# Patient Record
Sex: FEMALE | Race: WHITE | NOT HISPANIC OR LATINO | Employment: UNEMPLOYED | URBAN - METROPOLITAN AREA
[De-identification: names, ages, dates, MRNs, and addresses within clinical notes are randomized per-mention and may not be internally consistent; named-entity substitution may affect disease eponyms.]

---

## 2017-02-22 ENCOUNTER — RECORDS - HEALTHEAST (OUTPATIENT)
Dept: LAB | Facility: CLINIC | Age: 82
End: 2017-02-22

## 2017-02-22 LAB
CHOLEST SERPL-MCNC: 147 MG/DL
FASTING STATUS PATIENT QL REPORTED: ABNORMAL
HDLC SERPL-MCNC: 42 MG/DL
LDLC SERPL CALC-MCNC: 82 MG/DL
TRIGL SERPL-MCNC: 113 MG/DL

## 2017-03-21 ENCOUNTER — RECORDS - HEALTHEAST (OUTPATIENT)
Dept: ADMINISTRATIVE | Facility: OTHER | Age: 82
End: 2017-03-21

## 2017-03-21 ENCOUNTER — OFFICE VISIT (OUTPATIENT)
Dept: NEUROLOGY | Facility: CLINIC | Age: 82
End: 2017-03-21

## 2017-03-21 VITALS
SYSTOLIC BLOOD PRESSURE: 167 MMHG | HEART RATE: 53 BPM | DIASTOLIC BLOOD PRESSURE: 57 MMHG | HEIGHT: 63 IN | OXYGEN SATURATION: 96 % | RESPIRATION RATE: 20 BRPM

## 2017-03-21 DIAGNOSIS — M25.552 HIP PAIN, LEFT: Primary | ICD-10-CM

## 2017-03-21 DIAGNOSIS — R26.9 GAIT DIFFICULTY: ICD-10-CM

## 2017-03-21 DIAGNOSIS — G25.0 ESSENTIAL TREMOR: ICD-10-CM

## 2017-03-21 ASSESSMENT — ENCOUNTER SYMPTOMS
DOUBLE VISION: 0
SORE THROAT: 0
TASTE DISTURBANCE: 0
SINUS PAIN: 0
JOINT SWELLING: 0
NECK PAIN: 0
MYALGIAS: 1
NECK MASS: 0
BACK PAIN: 1
EYE PAIN: 0
SMELL DISTURBANCE: 0
MUSCLE WEAKNESS: 1
HOARSE VOICE: 1
EYE WATERING: 0
EYE REDNESS: 0
ARTHRALGIAS: 1
MUSCLE CRAMPS: 1
EYE IRRITATION: 0
STIFFNESS: 1
SINUS CONGESTION: 0
TROUBLE SWALLOWING: 1

## 2017-03-21 ASSESSMENT — PAIN SCALES - GENERAL: PAINLEVEL: WORST PAIN (10)

## 2017-03-21 NOTE — MR AVS SNAPSHOT
After Visit Summary   3/21/2017    Jennifer Ferrera    MRN: [id number]           Patient Information     Date Of Birth          11/23/1931        Visit Information        Provider Department      3/21/2017 11:15 AM Sakina Gibson APRN CNP [facility]        Today's Diagnoses     Hip pain, left    -  1    Gait difficulty        Essential tremor          Care Instructions    March 21, 2017      Dear Ms. Jennifer Ferrera,    Thank you for coming today.  During your visit, we have discussed the following: -    Deep Brain Stimulation (DBS):    __ Your DBS electrical system function (impedance) is normal. The battery life is also good.   __  Referral to Orthopedic for a 2nd opinion of left hip pain.   __  Return in 6 months.     For questions call [phone]      It's good to see you!      IGNACIO Hanley, CNP  [facility]              Follow-ups after your visit        Additional Services     ORTHOPEDICS ADULT REFERRAL       Your provider has referred you to: [facility]: Orthopaedic Clinic [facility] [phone]   [email or web address]      Please be aware that coverage of these services is subject to the terms and limitations of your health insurance plan.  Call member services at your health plan with any benefit or coverage questions.      Please bring the following to your appointment:    >>   Any x-rays, CTs or MRIs which have been performed.  Contact the facility where they were done to arrange for  prior to your scheduled appointment.  Any new CT, MRI or other procedures ordered by your specialist must be performed at a [facility] facility or coordinated by your clinic's referral office.    >>   List of current medications   >>   This referral request   >>   Any documents/labs given to you for this referral                  Your next 10 appointments already scheduled     Sep 29, 2017  2:15 PM CDT   (Arrive by 2:00 PM)   Return Movement  "Disorder with Iban Neves MD   [facility] ([facility])    [address]  [address] [place] [address] [phone]              Who to contact     Please call your clinic at [phone] to:    Ask questions about your health    Make or cancel appointments    Discuss your medicines    Learn about your test results    Speak to your doctor   If you have compliments or concerns about an experience at your clinic, or if you wish to file a complaint, please contact [facility] Patient Relations at [phone] or email us at [email or web address]         Additional Information About Your Visit        [employer]-COM Information     [employer] gives you secure access to your electronic health record. If you see a primary care provider, you can also send messages to your care team and make appointments. If you have questions, please call your primary care clinic.  If you do not have a primary care provider, please call [phone] and they will assist you.      [employer] is an electronic gateway that provides easy, online access to your medical records. With [employer], you can request a clinic appointment, read your test results, renew a prescription or communicate with your care team.     To access your existing account, please contact your [facility] or call [phone] for assistance.        Care EveryWhere ID     This is your Care EveryWhere ID. This could be used by other organizations to access your [place] medical records  [id number]        Your Vitals Were     Pulse Respirations Height Pulse Oximetry Breastfeeding?       53 20 1.6 m (5' 3\") 96% No        Blood Pressure from Last 3 Encounters:   03/21/17 167/57   11/22/16 180/56   11/08/16 154/69    Weight from Last 3 Encounters:   11/08/16 93.7 kg (206 lb 9.1 oz)   10/04/16 93.1 kg (205 lb 4.8 oz)   03/28/16 89.3 kg (196 lb 12.8 oz)              We " Performed the Following     ORTHOPEDICS ADULT REFERRAL        Primary Care Provider Office Phone # Fax #    Michelle Jennifer Carvajal [phone]       [facility] [address]  [place] [address]        Thank you!     Thank you for choosing [facility]  for your care. Our goal is always to provide you with excellent care. Hearing back from our patients is one way we can continue to improve our services. Please take a few minutes to complete the written survey that you may receive in the mail after your visit with us. Thank you!             Your Updated Medication List - Protect others around you: Learn how to safely use, store and throw away your medicines at www.disposemymeds.org.          This list is accurate as of: 3/21/17 11:59 AM.  Always use your most recent med list.                   Brand Name Dispense Instructions for use    ALEVE 220 MG tablet   Generic drug:  naproxen sodium      Take 1 tablet by mouth 2 times daily (with meals).       aspirin 81 MG tablet     90 tablet    Take 1 tablet (81 mg) by mouth daily       atenolol 50 MG tablet    TENORMIN    45 tablet    Taking 0.5 tablet daily       escitalopram 10 MG tablet    LEXAPRO    90 tablet    Take 1 tablet (10 mg) by mouth daily       HYDROcodone-acetaminophen 5-325 MG per tablet    NORCO    60 tablet    Take 1-2 tablets by mouth every 6 hours as needed for pain.       IBANdronate 150 MG tablet    BONIVA    1 tablet    Take 1 tablet (150 mg) by mouth every 30 days       Levothyroxine Sodium 75 MCG Caps     30 capsule    Take 75 mcg by mouth daily       omeprazole 20 MG tablet     90 tablet    Take 1 tablet (20 mg) by mouth daily Take 30-60 minutes before a meal.       oxyCODONE 5 MG IR tablet    ROXICODONE    18 tablet    Take 1 tablet (5 mg) by mouth every 4 hours as needed for pain       pravastatin 20 MG tablet    PRAVACHOL    90 tablet    Take 1 tablet (20 mg) by mouth daily       REFRESH 1 % ophthalmic  solution   Generic drug:  carboxymethylcellulose     1 Bottle    Place 1 drop into both eyes 2 times daily as needed       vitamin D 400 UNITS capsule     90 capsule    1 tablet daily

## 2017-03-21 NOTE — PATIENT INSTRUCTIONS
March 21, 2017      Dear Ms. Jennifer RADHA Ferrera,    Thank you for coming today.  During your visit, we have discussed the following: -    Deep Brain Stimulation (DBS):    __ Your DBS electrical system function (impedance) is normal. The battery life is also good.   __  Referral to Orthopedic for a 2nd opinion of left hip pain.   __  Return in 6 months.     For questions call [phone]      It's good to see you!      IGNACIO Hanley, CNP  [facility]

## 2017-03-21 NOTE — PROGRESS NOTES
PATIENT: Jennifer Ferrera    : 1931    ALLY: 2017    REASON FOR VISIT:  Follow up for essential tremor (ET) and DBS interrogation and analysis.      HISTORY OF PRESENT ILLNESS:  Ms. Jennifer Ferrera is an 85-year-old right-handed  female who came to the [facility] accompanied by her daughter for a follow up visit.  She was last seen in clinic on 2016 by Dr. Neves for a routine PD follow up visit.  I saw her last about a year ago.      The patient reports overall tremor is okay.  She had bilateral DBS generator replacement in 2016.  Since her battery replacement, she has noticed more tremor improvement.  She is able to do almost everything independently.  She continues to live by herself.  She drives to grocery stores and runs errands.  Her daughter helps her with the big groceries about once a month.  Otherwise, she is independent.  She reports when she writes, her right hand cramps.  She has not had any falls.      The patient reports bothersome left hip pain.  She has been seen by primary care as well as by [facility].  She reports that this has been an ongoing issue that is getting worse.  She has had 1 steroid injection without much benefit.  She has to use a walker, otherwise she feels unsteady on her feet due to hip giving out.  She has not had any falls.    Answers for HPI/ROS submitted by the patient on 3/21/2017   General Symptoms: No  Skin Symptoms: No  HENT Symptoms: Yes  EYE SYMPTOMS: Yes  HEART SYMPTOMS: No  LUNG SYMPTOMS: No  INTESTINAL SYMPTOMS: No  URINARY SYMPTOMS: No  GYNECOLOGIC SYMPTOMS: No  BREAST SYMPTOMS: No  SKELETAL SYMPTOMS: Yes  BLOOD SYMPTOMS: No  NERVOUS SYSTEM SYMPTOMS: No  MENTAL HEALTH SYMPTOMS: No  Ear pain: No  Ear discharge: No  Hearing loss: No  Tinnitus: No  Nosebleeds: No  Congestion: No  Sinus pain: No  Trouble swallowing: Yes   Voice hoarseness: Yes  Mouth sores: No  Sore throat: No  Tooth pain: No  Gum tenderness: No  Bleeding  "gums: No  Change in taste: No  Change in sense of smell: No  Dry mouth: Yes  Hearing aid used: Yes  Neck lump: No  Eye pain: No  Vision loss: No  Dry eyes: Yes  Watery eyes: No  Eye bulging: No  Double vision: No  Flashing of lights: Yes  Spots: Yes  Floaters: Yes  Redness: No  Crossed eyes: No  Tunnel Vision: No  Yellowing of eyes: No  Eye irritation: No  Back pain: Yes  Muscle aches: Yes  Neck pain: No  Swollen joints: No  Joint pain: Yes  Bone pain: No  Muscle cramps: Yes  Muscle weakness: Yes  Joint stiffness: Yes  Bone fracture: No      MEDICATIONS:   Medication Sig     escitalopram (LEXAPRO) 10 MG Take 1 tablet (10 mg) by mouth daily     aspirin 81 MG tablet Take 1 tablet (81 mg) by mouth daily     oxyCODONE (ROXICODONE) 5 MG immediate release  Take 1 tablet (5 mg) by mouth every 4 hours as needed for pain     Levothyroxine Sodium 75 MCG  Take 75 mcg by mouth daily     IBANdronate (BONIVA) 150 MG  Take 1 tablet (150 mg) by mouth every 30 days     pravastatin (PRAVACHOL) 20 MG  Take 1 tablet (20 mg) by mouth daily     Cholecalciferol (VITAMIN D) 400 UNITS  1 tablet daily     atenolol (TENORMIN) 50 MG  Taking 0.5 tablet daily     omeprazole 20 MG tablet Take 1 tablet (20 mg) by mouth daily Take 30-60 minutes before a meal.     carboxymethylcellulose (REFRESH) 1 % ophthalmic solution Place 1 drop into both eyes 2 times daily as needed     naproxen sodium (ALEVE) 220 MG  Take 1 tablet by mouth 2 times daily (with meals).     HYDROcodone-acetaminophen 5-325 MG Take 1-2 tablets by mouth every 6 hours as needed for pain.       ALLERGIES: Vioxx     PHYSICAL EXAMINATION:     VITAL SIGNS:  Blood pressure 167/57, pulse 53, resp. rate 20, height 1.6 m (5' 3\"), SpO2 96 %,    GENERAL:  Ms. Ferrera is a pleasant  female who is well-groomed, well-developed and overweight, sitting comfortably in the exam room without any distress.  Affect is appropriate.     MOVEMENT DISORDERS ASSESSMENT:  Speech is slurred but " understandable.  She has mild to moderate dysarthria.  Normal facial expression.  No rest tremor in all extremities.  Slight postural tremor bilaterally.  Mild action tremor bilaterally.  No rigidity in all extremities.  Finger tapping is normal.  Hand rapid alternating movements show mild slowing bilaterally.  Leg agility shows mild slowing bilaterally.  She was able to arise from a chair with arms folded across her chest with 1 attempt.  Posture is slightly stooped.  She did not have her walker today and attempted to walk without a walker by leaning against a wall.  Gait is slow and guarded.  She almost lost her balance and almost fell if not caught by examiner.  She was assisted back to the room.  She reported left hip pain.  She had mild body bradykinesia.      DBS Interrogation & Analysis:     Implanted generator: Bilateral chest Activa-SC.  Lead placement: Bilateral Ventral Intermedius Nucleus of Thalamus (VIM).      Left VIM  C +, 0 -, 1 -  Volts: 2.4 volts  PW: 60 msec  Rate: 180 Hz     Therapy On: 100%  Battery Service Life: OK. 2.98 volts.     Patient :  Upper Limit = 2.8 volts  Lower Limit = 2.2 volts.   Electrode Impedance Check: (Amplitude 3.0 volts: PW 80 msec: Rate 100 Hz)   Left Lead: No Problems Found.     Right VIM  3 +, 1 -  Volts: 3.0 volts  PW: 90 msec  Rate: 130 Hz     Therapy On: 100 %  Battery Service Life: OK. 2.97 volts.     Patient :  Upper Limit: 3.0 volts  Lower Limit: 3.0 volts     Electrode Impedance Check: (Amplitude 3.0 volts: PW 80 msec: Rate 100 Hz)   Right Lead: No Problems Found.     See scanned report for impedance details.    ASSESSMENT/PLAN:       1.  Essential tremor.  Ms. Ferrera is an 85-year-old right-handed  female with a long-standing history of essential tremor, status post bilateral VIM and DBS implantation who came to the clinic for a follow up visit.  Overall tremor is well controlled.  She reports getting more improvement after her  bilateral DBS generator replacement.      Her DBS interrogation and analysis shows normal impedance.  Battery life is good.      2.  Left hip pain.   Discussed about getting a second opinion from one of the [facility] providers.  I recommended getting her records from [facility] before being seen.  A referral was placed in Epic.      She will return to our clinic in 6 months for a follow up and may return sooner as needed.      The total time spent with the patient was 45 minutes, 20 minutes was in DBS interrogation and analysis and 25 minutes in addressing ET and left hip pain; 50% of the time was spent in counseling and coordination of care.      IGNACIO Hanley, CNP  [facility]             D: 2017 13:09   T: 2017 14:13   MT: [id number]      Name:     RIANNA WALTERS   MRN:      [id number]        Account:      [id number]   :      1931           Service Date: 2017      Document: [id number]

## 2017-03-29 ENCOUNTER — OFFICE VISIT (OUTPATIENT)
Dept: ORTHOPEDICS | Facility: CLINIC | Age: 82
End: 2017-03-29

## 2017-03-29 VITALS — HEIGHT: 63 IN | BODY MASS INDEX: 36.5 KG/M2 | WEIGHT: 206 LBS

## 2017-03-29 DIAGNOSIS — M25.552 LEFT HIP PAIN: Primary | ICD-10-CM

## 2017-03-29 NOTE — PROGRESS NOTES
Subjective:   Jennifer Ferrera is a 85 year old female who is here for left hip pain exacerbated in the last 6-12 months, affecting ambulating per daughter.   Sore and hard to walk.  Sleep is fine.  Hard to get up and walk.  Loss of balance.  Afraid that she'll fall.  Rolling walker, hand shakes with cane.  Chair- lift.  PT didn't seem to help.  Pain wasn't going away.    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  She  has a past surgical history that includes Stereotactic insertion deep brain stimulation (12 May 2003); Stereotactic insertion deep brain stimulation (9 jun 2003); Replace pulse generator subcutaneous bilateral (29 Jul 2008); Stereotactic insertion deep brain stimulation (oct 2002); Stereotactic insertion deep brain stimulation (oct 2002); Right DBS Lead removal (jan 2003); Cholecystectomy; Extracapsular cataract extration with intraocular lens implant; Extracapsular cataract extration with intraocular lens implant; Hysterectomy; Replace pulse generator subcutaneous bilateral (7/2/2013); Excision of basal cell carcinoma from RUE (Sep 2015); and Replace Deep Brain Stimulation Generator / Battery Bilateral (Bilateral, 11/8/2016).  Her family history includes CANCER in her brother and brother; Connective Tissue Disorder in her sister; DIABETES in her father; Neurologic Disorder in her brother, brother, and brother; Parkinsonism in her mother, sister, and other family members.  She reports that she has never smoked. She has never used smokeless tobacco. She  "reports that she does not drink alcohol or use illicit drugs.    ALLERGIES: She is allergic to vioxx.    CURRENT MEDICATIONS: She has a current medication list which includes the following prescription(s): escitalopram, aspirin, oxycodone, levothyroxine sodium, ibandronate, pravastatin, vitamin d, atenolol, omeprazole, carboxymethylcellulose, naproxen sodium, and hydrocodone-acetaminophen.     REVIEW OF SYSTEMS: 10 point review of systems is negative except as noted above.     Exam:   Ht 5' 3\" (1.6 m)  Wt 206 lb (93.4 kg)  BMI 36.49 kg/m2           CONSTITUTIONIAL: alert, mild distress, cooperative and obese  HEAD: Normocephalic. No masses, lesions, tenderness or abnormalities  SKIN: no suspicious lesions or rashes  GAIT: antalgic and obese pattern  NEUROLOGIC: Non-focal, Normal muscle tone and strength, reflexes normal, sensation grossly normal.  PSYCHIATRIC: affect normal/bright and mentation appears normal.    MUSCULOSKELETAL: left hip pain  Palpation: Tender:   left greater trochanter, left gluteus medius  Non-tender:  right greater trochanter, right gluteus medius, no groin/hip joint pain elicited   Range of Motion:  Left Hip  external rotation  full, internal rotation  decreased  Strength:  full strength  Special tests:  no crepitation/snapping over central inguinal region         Assessment/Plan:   Pt is an 86 yo white female with PMHx of high cholesterol presenting with left greater trochanteric pain  1. Left greater trochanteric pain- recommended US guided greater trochanteric injection with Dr. Nicko Ravi, pt reports difficulty getting to PT due to ride situation.  Pt given 2 exercises to do at home, glute squeezes and standing at sink, extending leg back, squeezing the glute  RTC 4 weeks post injection    X-RAY INTERPRETATION:   X-Ray of the Hip: 2-view, ap pelvis, Left Frogleg Lateral ordered and interpreted in the office today was positive for mild to moderate OA  "

## 2017-03-29 NOTE — LETTER
3/29/2017      RE: Jennifer Ferrera  [address]  [place] [facility] [place] [address]        Subjective:   Jennifer Ferrrea is a 85 year old female who is here for left hip pain exacerbated in the last 6-12 months, affecting ambulating per daughter.   Sore and hard to walk.  Sleep is fine.  Hard to get up and walk.  Loss of balance.  Afraid that she'll fall.  Rolling walker, hand shakes with cane.  Chair- lift.  PT didn't seem to help.  Pain wasn't going away.    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  She  has a past surgical history that includes Stereotactic insertion deep brain stimulation (12 May 2003); Stereotactic insertion deep brain stimulation (9 jun 2003); Replace pulse generator subcutaneous bilateral (29 Jul 2008); Stereotactic insertion deep brain stimulation (oct 2002); Stereotactic insertion deep brain stimulation (oct 2002); Right DBS Lead removal (jan 2003); Cholecystectomy; Extracapsular cataract extration with intraocular lens implant; Extracapsular cataract extration with intraocular lens implant; Hysterectomy; Replace pulse generator subcutaneous bilateral (7/2/2013); Excision of basal cell carcinoma from RUE (Sep 2015); and Replace Deep Brain Stimulation Generator / Battery Bilateral (Bilateral, 11/8/2016).  Her family history includes CANCER in her brother and brother; Connective Tissue Disorder in her sister; DIABETES in her father; Neurologic Disorder in her brother, brother, and brother; Parkinsonism in her mother, sister, and other family members.   "She reports that she has never smoked. She has never used smokeless tobacco. She reports that she does not drink alcohol or use illicit drugs.    ALLERGIES: She is allergic to vioxx.    CURRENT MEDICATIONS: She has a current medication list which includes the following prescription(s): escitalopram, aspirin, oxycodone, levothyroxine sodium, ibandronate, pravastatin, vitamin d, atenolol, omeprazole, carboxymethylcellulose, naproxen sodium, and hydrocodone-acetaminophen.     REVIEW OF SYSTEMS: 10 point review of systems is negative except as noted above.     Exam:   Ht 5' 3\" (1.6 m)  Wt 206 lb (93.4 kg)  BMI 36.49 kg/m2           CONSTITUTIONIAL: alert, mild distress, cooperative and obese  HEAD: Normocephalic. No masses, lesions, tenderness or abnormalities  SKIN: no suspicious lesions or rashes  GAIT: antalgic and obese pattern  NEUROLOGIC: Non-focal, Normal muscle tone and strength, reflexes normal, sensation grossly normal.  PSYCHIATRIC: affect normal/bright and mentation appears normal.    MUSCULOSKELETAL: left hip pain  Palpation: Tender:   left greater trochanter, left gluteus medius  Non-tender:  right greater trochanter, right gluteus medius, no groin/hip joint pain elicited   Range of Motion:  Left Hip  external rotation  full, internal rotation  decreased  Strength:  full strength  Special tests:  no crepitation/snapping over central inguinal region         Assessment/Plan:   Pt is an 86 yo white female with PMHx of high cholesterol presenting with left greater trochanteric pain  1. Left greater trochanteric pain- recommended US guided greater trochanteric injection with Dr. Nicko Ravi, pt reports difficulty getting to PT due to ride situation.  Pt given 2 exercises to do at home, glute squeezes and standing at sink, extending leg back, squeezing the glute  RTC 4 weeks post injection    X-RAY INTERPRETATION:   X-Ray of the Hip: 2-view, ap pelvis, Left Frogleg Lateral ordered and interpreted in the " office today was positive for mild to moderate OA    Sabra Abreu MD

## 2017-03-29 NOTE — MR AVS SNAPSHOT
After Visit Summary   3/29/2017    Jennifer Ferrera    MRN: [id number]           Patient Information     Date Of Birth          11/23/1931        Visit Information        Provider Department      3/29/2017 1:45 PM Sabra Abreu MD [facility]        Today's Diagnoses     Left hip pain    -  1       Follow-ups after your visit        Your next 10 appointments already scheduled     Mar 30, 2017  3:15 PM CDT   (Arrive by 3:00 PM)   Procedure with Noe Altman MD   [facility] ([facility])    [address]  5th [address]  [place] [address] [phone]            Sep 29, 2017  2:15 PM CDT   (Arrive by 2:00 PM)   Return Movement Disorder with Iban Neves MD   [facility] ([facility])    [address]  3rd Floor  [place] [address] [phone]              Who to contact     Please call your clinic at [phone] to:    Ask questions about your health    Make or cancel appointments    Discuss your medicines    Learn about your test results    Speak to your doctor   If you have compliments or concerns about an experience at your clinic, or if you wish to file a complaint, please contact [facility] Patient Relations at [phone] or email us at [email or web address]         Additional Information About Your Visit        MyChart Information     [employer] gives you secure access to your electronic health record. If you see a primary care provider, you can also send messages to your care team and make appointments. If you have questions, please call your primary care clinic.  If you do not have a primary care provider, please call [phone] and they will assist you.      [employer] is an electronic gateway that provides easy, online access to your medical records. With [employer], you can request a clinic appointment, read your test results,  "renew a prescription or communicate with your care team.     To access your existing account, please contact your [facility] or call [phone] for assistance.        Care EveryWhere ID     This is your Care EveryWhere ID. This could be used by other organizations to access your [place] medical records  [id number]        Your Vitals Were     Height BMI (Body Mass Index)                1.6 m (5' 3\") 36.49 kg/m2           Blood Pressure from Last 3 Encounters:   03/21/17 167/57   11/22/16 180/56   11/08/16 154/69    Weight from Last 3 Encounters:   03/29/17 93.4 kg (206 lb)   11/08/16 93.7 kg (206 lb 9.1 oz)   10/04/16 93.1 kg (205 lb 4.8 oz)               Primary Care Provider Office Phone # Fax #    Michelle Carvajal [phone]       [facility] [address]  [place] [address]        Thank you!     Thank you for choosing [facility]  for your care. Our goal is always to provide you with excellent care. Hearing back from our patients is one way we can continue to improve our services. Please take a few minutes to complete the written survey that you may receive in the mail after your visit with us. Thank you!             Your Updated Medication List - Protect others around you: Learn how to safely use, store and throw away your medicines at www.disposemymeds.org.          This list is accurate as of: 3/29/17  2:31 PM.  Always use your most recent med list.                   Brand Name Dispense Instructions for use    ALEVE 220 MG tablet   Generic drug:  naproxen sodium      Take 1 tablet by mouth 2 times daily (with meals).       aspirin 81 MG tablet     90 tablet    Take 1 tablet (81 mg) by mouth daily       atenolol 50 MG tablet    TENORMIN    45 tablet    Taking 0.5 tablet daily       escitalopram 10 MG tablet    LEXAPRO    90 tablet    Take 1 tablet (10 mg) by mouth daily       HYDROcodone-acetaminophen 5-325 MG per tablet "    NORCO    60 tablet    Take 1-2 tablets by mouth every 6 hours as needed for pain.       IBANdronate 150 MG tablet    BONIVA    1 tablet    Take 1 tablet (150 mg) by mouth every 30 days       Levothyroxine Sodium 75 MCG Caps     30 capsule    Take 75 mcg by mouth daily       omeprazole 20 MG tablet     90 tablet    Take 1 tablet (20 mg) by mouth daily Take 30-60 minutes before a meal.       oxyCODONE 5 MG IR tablet    ROXICODONE    18 tablet    Take 1 tablet (5 mg) by mouth every 4 hours as needed for pain       pravastatin 20 MG tablet    PRAVACHOL    90 tablet    Take 1 tablet (20 mg) by mouth daily       REFRESH 1 % ophthalmic solution   Generic drug:  carboxymethylcellulose     1 Bottle    Place 1 drop into both eyes 2 times daily as needed       vitamin D 400 UNITS capsule     90 capsule    1 tablet daily

## 2017-03-30 ENCOUNTER — OFFICE VISIT (OUTPATIENT)
Dept: ORTHOPEDICS | Facility: CLINIC | Age: 82
End: 2017-03-30

## 2017-03-30 VITALS — WEIGHT: 206 LBS | HEIGHT: 63 IN | BODY MASS INDEX: 36.5 KG/M2

## 2017-03-30 DIAGNOSIS — M25.552 LATERAL PAIN OF LEFT HIP: Primary | ICD-10-CM

## 2017-03-30 RX ORDER — TRIAMCINOLONE ACETONIDE 40 MG/ML
40 INJECTION, SUSPENSION INTRA-ARTICULAR; INTRAMUSCULAR ONCE
Qty: 1 ML | Refills: 0 | OUTPATIENT
Start: 2017-03-30 | End: 2017-03-30

## 2017-03-30 NOTE — LETTER
3/30/2017      RE: Jennifer Ferrera  [address]  [place] [address]        Subjective:   Jennifer Ferrera is a 85 year old female with a hx of Bilateral hip osteoarthritis who is here for a left hip greater trochanter injection.  She relates a history of what sounds like a L hip joint injection 1-2 years ago without relief perhaps at [facility] in [place]. [facility] records signs for without result of injection. She gets pain in the L lateral hip with walking.  She had been referred to PT, but could not proceed as her BP was too high at visits. Otherwise well controlled.      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  She  has a past surgical history that includes Stereotactic insertion deep brain stimulation (12 May 2003); Stereotactic insertion deep brain stimulation (9 jun 2003); Replace pulse generator subcutaneous bilateral (29 Jul 2008); Stereotactic insertion deep brain stimulation (oct 2002); Stereotactic insertion deep brain stimulation (oct 2002); Right DBS Lead removal (jan 2003); Cholecystectomy; Extracapsular cataract extration with intraocular lens implant; Extracapsular cataract extration with intraocular lens implant; Hysterectomy; Replace pulse generator subcutaneous bilateral (7/2/2013); Excision of basal cell carcinoma from RUE (Sep 2015); and Replace Deep Brain Stimulation Generator / Battery Bilateral (Bilateral, 11/8/2016).   "Her family history includes CANCER in her brother and brother; Connective Tissue Disorder in her sister; DIABETES in her father; Neurologic Disorder in her brother, brother, and brother; Parkinsonism in her mother, sister, and other family members.  She reports that she has never smoked. She has never used smokeless tobacco. She reports that she does not drink alcohol or use illicit drugs.    ALLERGIES: She is allergic to vioxx.    CURRENT MEDICATIONS: She has a current medication list which includes the following prescription(s): escitalopram, aspirin, oxycodone, levothyroxine sodium, ibandronate, pravastatin, vitamin d, atenolol, omeprazole, carboxymethylcellulose, naproxen sodium, and hydrocodone-acetaminophen.     REVIEW OF SYSTEMS: 3 point review of systems is negative except as noted above.     Exam:   Ht 5' 3\" (1.6 m)  Wt 206 lb (93.4 kg)  BMI 36.49 kg/m2           CONSTITUTIONIAL: obese, pleasant, no distress  L hip tender L lateral hip over inferior facet greater troch. IR/ER does not reproduce pain. Some pain with reissted abduction  Mild low back tenderness as well       Assessment/Plan:    L lateral hip pain with walking  --discussed palpation guided vs U/S guided injection for this. Also that injections of steroid are temporary and should be paired with rehabilitative exercises.  --she opted for palpation guided injection and thought she could start PT if she went to [facility] which is close enough to her [place] where [place] drive on [address].  --order given    A steroid injection was performed at L inferior greater troch facet/bura at the point of max tenderness using 3cc 1% plain Lidocaine and 40 mg of Kenalog. This was well tolerated with decreased but not full relief of pain with abduction post injection.    Noe Altman MD CAQ    "

## 2017-03-30 NOTE — PROGRESS NOTES
Subjective:   Jennifer Ferrera is a 85 year old female with a hx of Bilateral hip osteoarthritis who is here for a left hip greater trochanter injection.  She relates a history of what sounds like a L hip joint injection 1-2 years ago without relief perhaps at [facility] in [place]. [facility] records signs for without result of injection. She gets pain in the L lateral hip with walking.  She had been referred to PT, but could not proceed as her BP was too high at visits. Otherwise well controlled.      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  She  has a past surgical history that includes Stereotactic insertion deep brain stimulation (12 May 2003); Stereotactic insertion deep brain stimulation (9 jun 2003); Replace pulse generator subcutaneous bilateral (29 Jul 2008); Stereotactic insertion deep brain stimulation (oct 2002); Stereotactic insertion deep brain stimulation (oct 2002); Right DBS Lead removal (jan 2003); Cholecystectomy; Extracapsular cataract extration with intraocular lens implant; Extracapsular cataract extration with intraocular lens implant; Hysterectomy; Replace pulse generator subcutaneous bilateral (7/2/2013); Excision of basal cell carcinoma from RUE (Sep 2015); and Replace Deep Brain Stimulation Generator / Battery Bilateral (Bilateral, 11/8/2016).  Her family history includes CANCER in her brother and brother; Connective Tissue  "Disorder in her sister; DIABETES in her father; Neurologic Disorder in her brother, brother, and brother; Parkinsonism in her mother, sister, and other family members.  She reports that she has never smoked. She has never used smokeless tobacco. She reports that she does not drink alcohol or use illicit drugs.    ALLERGIES: She is allergic to vioxx.    CURRENT MEDICATIONS: She has a current medication list which includes the following prescription(s): escitalopram, aspirin, oxycodone, levothyroxine sodium, ibandronate, pravastatin, vitamin d, atenolol, omeprazole, carboxymethylcellulose, naproxen sodium, and hydrocodone-acetaminophen.     REVIEW OF SYSTEMS: 3 point review of systems is negative except as noted above.     Exam:   Ht 5' 3\" (1.6 m)  Wt 206 lb (93.4 kg)  BMI 36.49 kg/m2           CONSTITUTIONIAL: obese, pleasant, no distress  L hip tender L lateral hip over inferior facet greater troch. IR/ER does not reproduce pain. Some pain with reissted abduction  Mild low back tenderness as well       Assessment/Plan:    L lateral hip pain with walking  --discussed palpation guided vs U/S guided injection for this. Also that injections of steroid are temporary and should be paired with rehabilitative exercises.  --she opted for palpation guided injection and thought she could start PT if she went to [facility] which is close enough to her house where she can drive on [place].  --order given    A steroid injection was performed at L inferior greater troch facet/bura at the point of max tenderness using 3cc 1% plain Lidocaine and 40 mg of Kenalog. This was well tolerated with decreased but not full relief of pain with abduction post injection.    Noe Altman MD CAQ    "

## 2017-03-30 NOTE — NURSING NOTE
[address] [address]  Dept: [phone]  ______________________________________________________________________________    Patient: Jennifer Ferrera   : 1931   MRN: [id number]   2017    INVASIVE PROCEDURE SAFETY CHECKLIST    Date: 3/30/2017   Procedure:Left hip injection with kenalog  Patient Name: Jennifer Ferrera  MRN: [id number]  YOB: 1931    Action: Complete sections as appropriate. Any discrepancy results in a HARD COPY until resolved.     PRE PROCEDURE:  Patient ID verified with 2 identifiers (name and  or MRN): Yes  Procedure and site verified with patient/designee (when able): Yes  Accurate consent documentation in medical record: Yes  H&P (or appropriate assessment) documented in medical record: Yes  H&P must be up to 20 days prior to procedure and updates within 24 hours of procedure as applicable: NA  Relevant diagnostic and radiology test results appropriately labeled and displayed as applicable: Yes  Procedure site(s) marked with provider initials: NA    TIMEOUT:  Time-Out performed immediately prior to starting procedure, including verbal and active participation of all team members addressing the following:Yes  * Correct patient identify  * Confirmed that the correct side and site are marked  * An accurate procedure consent form  * Agreement on the procedure to be done  * Correct patient position  * Relevant images and results are properly labeled and appropriately displayed  * The need to administer antibiotics or fluids for irrigation purposes during the procedure as applicable   * Safety precautions based on patient history or medication use    DURING PROCEDURE: Verification of correct person, site, and procedures any time the responsibility for care of the patient is transferred to another member of the care team.

## 2017-04-12 ENCOUNTER — COMMUNICATION - HEALTHEAST (OUTPATIENT)
Dept: TELEHEALTH | Facility: CLINIC | Age: 82
End: 2017-04-12

## 2017-04-12 ENCOUNTER — HOSPITAL ENCOUNTER (OUTPATIENT)
Dept: RADIOLOGY | Facility: CLINIC | Age: 82
Discharge: HOME | End: 2017-04-12
Attending: OTOLARYNGOLOGY

## 2017-04-12 DIAGNOSIS — K21.9 ESOPHAGEAL REFLUX: ICD-10-CM

## 2017-04-12 DIAGNOSIS — R13.10 DYSPHAGIA, UNSPECIFIED TYPE: ICD-10-CM

## 2017-05-30 ENCOUNTER — AMBULATORY - HEALTHEAST (OUTPATIENT)
Dept: ADMINISTRATIVE | Facility: REHABILITATION | Age: 82
End: 2017-05-30
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
## 2017-05-30 DIAGNOSIS — Z74.09 IMPAIRED FUNCTIONAL MOBILITY, BALANCE, GAIT, AND ENDURANCE: ICD-10-CM

## 2017-06-02 ENCOUNTER — OFFICE VISIT - HEALTHEAST (OUTPATIENT)
Dept: PHYSICAL THERAPY | Facility: REHABILITATION | Age: 82
End: 2017-06-02

## 2017-06-02 DIAGNOSIS — M62.81 GENERALIZED MUSCLE WEAKNESS: ICD-10-CM

## 2017-06-02 DIAGNOSIS — R26.81 UNSTEADINESS ON FEET: ICD-10-CM

## 2017-06-02 DIAGNOSIS — R26.9 ABNORMALITY OF GAIT: ICD-10-CM

## 2017-06-07 ENCOUNTER — OFFICE VISIT - HEALTHEAST (OUTPATIENT)
Dept: PHYSICAL THERAPY | Facility: REHABILITATION | Age: 82
End: 2017-06-07

## 2017-06-07 DIAGNOSIS — M62.81 GENERALIZED MUSCLE WEAKNESS: ICD-10-CM

## 2017-06-07 DIAGNOSIS — R26.81 UNSTEADINESS ON FEET: ICD-10-CM

## 2017-06-07 DIAGNOSIS — R26.9 ABNORMALITY OF GAIT: ICD-10-CM

## 2017-09-24 NOTE — PROGRESS NOTES
Diagnosis/Summary/Recommendations:    PATIENT: Jennifer Ferrera  85 year old female     : 1931    ALLY: 2017    Et  dbs 14 yrs ago    Not walking much due to hip pain and feet are sore   Using a walker  Went grocery shopping this morning.    She has ongoing arthritis.       Has had more slurring  She has had a teeth issue  She can sleep all the time    bp was on the higher side today    2016 left ipg and right replaced by Dr. wilson    Over 50% of this visit was spent in patient care and care coordination.     History obtained from patient    Total visit time was 25 minutes    PLAN  Ongoing hip issues  Deep brain stimulation (DBS) is working  Has increased slurring  Has gait problems that is most likely multifactorial.   Will forego imaging at this time.  Last seen 6 months ago.   Will have her come back to see Daja in 1 year.       Iban Neves MD     ______________________________________    Last visit date and details:     Tremor     She still has slurring of speech     Has had hip pain and now has a lift chair that has helped her out.   Had been seen at [facility] in the past.   Still living in [place]     Taking atenolol for blood pressure.   Blood pressure machine was not working today but will be checked again.     Still taking lexapro     Taking boniva once month     On thyroid medication     On pravastatin     On omeprazole     Sleeping - 3 and 4 hours and then gets up to go to the bathroom.  She is sleeping a lot during the day.      She put on 10 lbs the last month and has increased swelling in her legs.      PLAN:     Probably would have her get bilateral ipg replaced - left is 2.59 and the right is getting lower  Referral to Dr. Collins     Return to see daja in 6 months     Iban neves MD            PATIENT: Jennifer Ferrera     : 1931     ALLY: 2017     REASON FOR VISIT:  Follow up for essential tremor (ET) and DBS interrogation and analysis.       HISTORY OF  PRESENT ILLNESS:  Ms. Jennifer Ferrera is an 85-year-old right-handed  female who came to the [facility] accompanied by her daughter for a follow up visit.  She was last seen in clinic on 09/13/2016 by Dr. Neves for a routine PD follow up visit.  I saw her last about a year ago.       The patient reports overall tremor is okay.  She had bilateral DBS generator replacement in 11/2016.  Since her battery replacement, she has noticed more tremor improvement.  She is able to do almost everything independently.  She continues to live by herself.  She drives to grocery stores and runs errands.  Her daughter helps her with the big groceries about once a month.  Otherwise, she is independent.  She reports when she writes, her right hand cramps.  She has not had any falls.       The patient reports bothersome left hip pain.  She has been seen by primary care as well as by [facility].  She reports that this has been an ongoing issue that is getting worse.  She has had 1 steroid injection without much benefit.  She has to use a walker, otherwise she feels unsteady on her feet due to hip giving out.  She has not had any falls.     Answers for HPI/ROS submitted by the patient on 3/21/2017   General Symptoms: No  Skin Symptoms: No  HENT Symptoms: Yes  EYE SYMPTOMS: Yes  HEART SYMPTOMS: No  LUNG SYMPTOMS: No  INTESTINAL SYMPTOMS: No  URINARY SYMPTOMS: No  GYNECOLOGIC SYMPTOMS: No  BREAST SYMPTOMS: No  SKELETAL SYMPTOMS: Yes  BLOOD SYMPTOMS: No  NERVOUS SYSTEM SYMPTOMS: No  MENTAL HEALTH SYMPTOMS: No  Ear pain: No  Ear discharge: No  Hearing loss: No  Tinnitus: No  Nosebleeds: No  Congestion: No  Sinus pain: No  Trouble swallowing: Yes   Voice hoarseness: Yes  Mouth sores: No  Sore throat: No  Tooth pain: No  Gum tenderness: No  Bleeding gums: No  Change in taste: No  Change in sense of smell: No  Dry mouth: Yes  Hearing aid used: Yes  Neck lump: No  Eye pain: No  Vision loss: No  Dry eyes: Yes  Watery eyes:  "No  Eye bulging: No  Double vision: No  Flashing of lights: Yes  Spots: Yes  Floaters: Yes  Redness: No  Crossed eyes: No  Tunnel Vision: No  Yellowing of eyes: No  Eye irritation: No  Back pain: Yes  Muscle aches: Yes  Neck pain: No  Swollen joints: No  Joint pain: Yes  Bone pain: No  Muscle cramps: Yes  Muscle weakness: Yes  Joint stiffness: Yes  Bone fracture: No        MEDICATIONS:        Medication Sig     escitalopram (LEXAPRO) 10 MG Take 1 tablet (10 mg) by mouth daily     aspirin 81 MG tablet Take 1 tablet (81 mg) by mouth daily     oxyCODONE (ROXICODONE) 5 MG immediate release  Take 1 tablet (5 mg) by mouth every 4 hours as needed for pain     Levothyroxine Sodium 75 MCG  Take 75 mcg by mouth daily     IBANdronate (BONIVA) 150 MG  Take 1 tablet (150 mg) by mouth every 30 days     pravastatin (PRAVACHOL) 20 MG  Take 1 tablet (20 mg) by mouth daily     Cholecalciferol (VITAMIN D) 400 UNITS  1 tablet daily     atenolol (TENORMIN) 50 MG  Taking 0.5 tablet daily     omeprazole 20 MG tablet Take 1 tablet (20 mg) by mouth daily Take 30-60 minutes before a meal.     carboxymethylcellulose (REFRESH) 1 % ophthalmic solution Place 1 drop into both eyes 2 times daily as needed     naproxen sodium (ALEVE) 220 MG  Take 1 tablet by mouth 2 times daily (with meals).     HYDROcodone-acetaminophen 5-325 MG Take 1-2 tablets by mouth every 6 hours as needed for pain.         ALLERGIES: Vioxx      PHYSICAL EXAMINATION:      VITAL SIGNS:  Blood pressure 167/57, pulse 53, resp. rate 20, height 1.6 m (5' 3\"), SpO2 96 %,     GENERAL:  Ms. Ferrera is a pleasant  female who is well-groomed, well-developed and overweight, sitting comfortably in the exam room without any distress.  Affect is appropriate.      MOVEMENT DISORDERS ASSESSMENT:  Speech is slurred but understandable.  She has mild to moderate dysarthria.  Normal facial expression.  No rest tremor in all extremities.  Slight postural tremor bilaterally.  Mild action " tremor bilaterally.  No rigidity in all extremities.  Finger tapping is normal.  Hand rapid alternating movements show mild slowing bilaterally.  Leg agility shows mild slowing bilaterally.  She was able to arise from a chair with arms folded across her chest with 1 attempt.  Posture is slightly stooped.  She did not have her walker today and attempted to walk without a walker by leaning against a wall.  Gait is slow and guarded.  She almost lost her balance and almost fell if not caught by examiner.  She was assisted back to the room.  She reported left hip pain.  She had mild body bradykinesia.       DBS Interrogation & Analysis:      Implanted generator: Bilateral chest Activa-SC.  Lead placement: Bilateral Ventral Intermedius Nucleus of Thalamus (VIM).       Left VIM  C +, 0 -, 1 -  Volts: 2.4 volts  PW: 60 msec  Rate: 180 Hz      Therapy On: 100%  Battery Service Life: OK. 2.98 volts.      Patient :  Upper Limit = 2.8 volts  Lower Limit = 2.2 volts.   Electrode Impedance Check: (Amplitude 3.0 volts: PW 80 msec: Rate 100 Hz)   Left Lead: No Problems Found.      Right VIM  3 +, 1 -  Volts: 3.0 volts  PW: 90 msec  Rate: 130 Hz      Therapy On: 100 %  Battery Service Life: OK. 2.97 volts.      Patient :  Upper Limit: 3.0 volts  Lower Limit: 3.0 volts      Electrode Impedance Check: (Amplitude 3.0 volts: PW 80 msec: Rate 100 Hz)   Right Lead: No Problems Found.      See scanned report for impedance details.     ASSESSMENT/PLAN:         1.  Essential tremor.  Ms. Ferrera is an 85-year-old right-handed  female with a long-standing history of essential tremor, status post bilateral VIM and DBS implantation who came to the clinic for a follow up visit.  Overall tremor is well controlled.  She reports getting more improvement after her bilateral DBS generator replacement.       Her DBS interrogation and analysis shows normal impedance.  Battery life is good.       2.  Left hip pain.   Discussed  about getting a second opinion from one of the [facility] providers.  I recommended getting her records from [facility] before being seen.  A referral was placed in Epic.       She will return to our clinic in 6 months for a follow up and may return sooner as needed.       The total time spent with the patient was 45 minutes, 20 minutes was in DBS interrogation and analysis and 25 minutes in addressing ET and left hip pain; 50% of the time was spent in counseling and coordination of care.       IGNACIO Hanley, CNP  [facility]                D: 2017 13:09   T: 2017 14:13   MT: [id number]       Name:     RIANNA WALTERS   MRN:      [id number]        Account:      [id number]   :      1931           Service Date: 2017       Document: [id number]         ______________________________________      Patient was asked about 14 Review of systems including changes in vision (dry eyes, double vision), hearing, heart, lungs, musculoskeletal, depression, anxiety, snoring, RBD, insomnia, urinary frequency, urinary urgency, constipation, swallowing problems, hematological, ID, allergies, skin problems: seborrhea, endocrinological: thyroid, diabetes, cholesterol; balance, weight changes, and other neurological problems and these were not significant at this time except for   Patient Active Problem List   Diagnosis     Tremor     S/P brain surgery     Lymphedema     Anxiety     PSEUDOBULBAR AFFECT     Wears glasses     Hearing loss     Urinary incontinence, urge     Urinary urgency     Memory loss     Skin cancer     Hypercholesterolemia     Nocturia     Mood change (H)     Macular degeneration (senile) of retina     Recurrent falls     Osteoporosis     Dysarthria     Facial numbness     Tooth infection     Basal cell carcinoma of deltoid region, right     1St degree AV block     Low back pain          Allergies   Allergen Reactions     Vioxx      Pt. Is unaware of any allergy to this  med.     Past Surgical History:   Procedure Laterality Date     CHOLECYSTECTOMY       Excision of basal cell carcinoma from RUE  Sep 2015    right shoulder     EXTRACAPSULAR CATARACT EXTRATION WITH INTRAOCULAR LENS IMPLANT       EXTRACAPSULAR CATARACT EXTRATION WITH INTRAOCULAR LENS IMPLANT       HYSTERECTOMY       REPLACE DEEP BRAIN STIMULATION GENERATOR / BATTERY BILATERAL Bilateral 11/8/2016    Procedure: REPLACE DEEP BRAIN STIMULATION GENERATOR / BATTERY BILATERAL;  Surgeon: Bentley Martinez MD;  Location: [facility] OR     REPLACE PULSE GENERATOR SUBCUTANEOUS BILATERAL  29 Jul 2008    Replaced both soletra     REPLACE PULSE GENERATOR SUBCUTANEOUS BILATERAL  7/2/2013    Procedure: REPLACE PULSE GENERATOR SUBCUTANEOUS BILATERAL;  Bilateral Deep Brain Stimulator Battery Exchange ;  Surgeon: Bentley aMrtinez MD;  Location: [facility] OR     Right DBS Lead removal  jan 2003    infected right lead removed; was in a nursing home on antiobiotics     STEREOTACTIC INSERTION DEEP BRAIN STIMULATION  12 May 2003    placed lead in may after removal of right thalamic  DBS lead due to infection feb 17 2003     STEREOTACTIC INSERTION DEEP BRAIN STIMULATION  9 jun 2003    revision of right DBS electrode     STEREOTACTIC INSERTION DEEP BRAIN STIMULATION  oct 2002    left thalamic DBS lead placed     STEREOTACTIC INSERTION DEEP BRAIN STIMULATION  oct 2002    right thalamic DBS lead placed     Past Medical History:   Diagnosis Date     1St degree AV block 10/20/2015    ECG shows mild sinus bradycardia with 1st degree heart block and min voltage criteria for LVH; borderline ECG     Anxiety      Dysarthria      Facial numbness      Hearing loss      Hypercholesterolemia      Hypertension      Low back pain      Lymphedema      Macular degeneration (senile) of retina      Memory loss      Mood change (H)      Osteoporosis, unspecified      PSEUDOBULBAR AFFECT      Recurrent falls      Skin cancer      Tremor      Urinary incontinence,  urge      Urinary urgency      Wears glasses      Social History     Social History     Marital status:      Spouse name: N/A     Number of children: N/A     Years of education: N/A     Occupational History     Not on file.     Social History Main Topics     Smoking status: Never Smoker     Smokeless tobacco: Never Used     Alcohol use No     Drug use: No     Sexual activity: Not on file     Other Topics Concern     Not on file     Social History Narrative    lives in [place]. . Daughter Mary Ann Mijares.       Drug and lactation database from the [place] [employer]:  http://toxnet.nlm.nih.gov/cgi-bin/sis/htmlgen?LACT       .[email or web address]    B/P: Data Unavailable, T: Data Unavailable, P: Data Unavailable, R: Data Unavailable 0 lbs 0 oz  not currently breastfeeding., There is no height or weight on file to calculate BMI.  Medications and Vitals not listed above were documented in the cart and reviewed by me.     Current Outpatient Prescriptions   Medication Sig Dispense Refill     escitalopram (LEXAPRO) 10 MG tablet Take 1 tablet (10 mg) by mouth daily 90 tablet 3     aspirin 81 MG tablet Take 1 tablet (81 mg) by mouth daily 90 tablet 0     oxyCODONE (ROXICODONE) 5 MG immediate release tablet Take 1 tablet (5 mg) by mouth every 4 hours as needed for pain 18 tablet 0     Levothyroxine Sodium 75 MCG CAPS Take 75 mcg by mouth daily 30 capsule      IBANdronate (BONIVA) 150 MG tablet Take 1 tablet (150 mg) by mouth every 30 days 1 tablet 11     pravastatin (PRAVACHOL) 20 MG tablet Take 1 tablet (20 mg) by mouth daily 90 tablet 3     Cholecalciferol (VITAMIN D) 400 UNITS capsule 1 tablet daily 90 capsule 11     atenolol (TENORMIN) 50 MG tablet Taking 0.5 tablet daily 45 tablet 3     omeprazole 20 MG tablet Take 1 tablet (20 mg) by mouth daily Take 30-60 minutes before a meal. 90 tablet 3     carboxymethylcellulose (REFRESH) 1 % ophthalmic solution Place 1 drop into both eyes 2 times  daily as needed 1 Bottle      naproxen sodium (ALEVE) 220 MG tablet Take 1 tablet by mouth 2 times daily (with meals).       HYDROcodone-acetaminophen 5-325 MG per tablet Take 1-2 tablets by mouth every 6 hours as needed for pain. 60 tablet 0         Iban Neves MD

## 2017-09-29 ENCOUNTER — TELEPHONE (OUTPATIENT)
Dept: NEUROLOGY | Facility: CLINIC | Age: 82
End: 2017-09-29

## 2017-09-29 ENCOUNTER — OFFICE VISIT (OUTPATIENT)
Dept: NEUROLOGY | Facility: CLINIC | Age: 82
End: 2017-09-29

## 2017-09-29 VITALS
BODY MASS INDEX: 37.28 KG/M2 | DIASTOLIC BLOOD PRESSURE: 63 MMHG | SYSTOLIC BLOOD PRESSURE: 186 MMHG | HEART RATE: 59 BPM | HEIGHT: 63 IN | WEIGHT: 210.4 LBS

## 2017-09-29 DIAGNOSIS — G25.0 ESSENTIAL TREMOR: Primary | ICD-10-CM

## 2017-09-29 RX ORDER — OXYBUTYNIN CHLORIDE 5 MG/1
5 TABLET ORAL DAILY
Qty: 360 TABLET | Refills: 0 | COMMUNITY
Start: 2017-09-29 | End: 2018-10-25

## 2017-09-29 RX ORDER — OXYBUTYNIN CHLORIDE 5 MG/1
5 TABLET, EXTENDED RELEASE ORAL DAILY
Qty: 90 TABLET | Refills: 3 | COMMUNITY
Start: 2017-09-29 | End: 2019-10-22

## 2017-09-29 ASSESSMENT — PAIN SCALES - GENERAL: PAINLEVEL: NO PAIN (0)

## 2017-09-29 NOTE — NURSING NOTE
Chief Complaint   Patient presents with     RECHECK     P RETURN - MOVEMENT DISORDER     Ade Fernandez MA

## 2017-09-29 NOTE — LETTER
2017      RE: Jennifer Ferrera  [address]  [facility] [id number]       Diagnosis/Summary/Recommendations:    PATIENT: Jennifer Ferrera  85 year old female     : 1931    ALLY: 2017    Et  dbs              Over 50% of this visit was spent in patient care and care coordination.     History obtained from patient    Total visit time was 25 minutes      Iban Neves MD     ______________________________________    Last visit date and details:     Tremor     She still has slurring of speech     Has had hip pain and now has a lift chair that has helped her out.   Had been seen at [facility] in the past.   Still living in [place]     Taking atenolol for blood pressure.   Blood pressure machine was not working today but will be checked again.     Still taking lexapro     Taking boniva once month     On thyroid medication     On pravastatin     On omeprazole     Sleeping - 3 and 4 hours and then gets up to go to the bathroom.  She is sleeping a lot during the day.      She put on 10 lbs the last month and has increased swelling in her legs.      PLAN:     Probably would have her get bilateral ipg replaced - left is 2.59 and the right is getting lower  Referral to Dr. Collins     Return to see [facility] in 6 months     Iban neves MD            PATIENT: Jennifer Ferrera     : 1931     ALLY: 2017     REASON FOR VISIT:  Follow up for essential tremor (ET) and DBS interrogation and analysis.       HISTORY OF PRESENT ILLNESS:  Ms. Jennifer Ferrera is an 85-year-old right-handed  female who came to the [facility] accompanied by her daughter for a follow up visit.  She was last seen in clinic on 2016 by Dr. Neves for a routine PD follow up visit.  I saw her last about a year ago.       The patient reports overall tremor is okay.  She had bilateral DBS generator replacement in 2016.  Since her battery replacement, she has noticed more tremor improvement.  She is able  to do almost everything independently.  She continues to live by herself.  She drives to grocery stores and runs errands.  Her daughter helps her with the big groceries about once a month.  Otherwise, she is independent.  She reports when she writes, her right hand cramps.  She has not had any falls.       The patient reports bothersome left hip pain.  She has been seen by primary care as well as by [facility].  She reports that this has been an ongoing issue that is getting worse.  She has had 1 steroid injection without much benefit.  She has to use a walker, otherwise she feels unsteady on her feet due to hip giving out.  She has not had any falls.     Answers for HPI/ROS submitted by the patient on 3/21/2017   General Symptoms: No  Skin Symptoms: No  HENT Symptoms: Yes  EYE SYMPTOMS: Yes  HEART SYMPTOMS: No  LUNG SYMPTOMS: No  INTESTINAL SYMPTOMS: No  URINARY SYMPTOMS: No  GYNECOLOGIC SYMPTOMS: No  BREAST SYMPTOMS: No  SKELETAL SYMPTOMS: Yes  BLOOD SYMPTOMS: No  NERVOUS SYSTEM SYMPTOMS: No  MENTAL HEALTH SYMPTOMS: No  Ear pain: No  Ear discharge: No  Hearing loss: No  Tinnitus: No  Nosebleeds: No  Congestion: No  Sinus pain: No  Trouble swallowing: Yes   Voice hoarseness: Yes  Mouth sores: No  Sore throat: No  Tooth pain: No  Gum tenderness: No  Bleeding gums: No  Change in taste: No  Change in sense of smell: No  Dry mouth: Yes  Hearing aid used: Yes  Neck lump: No  Eye pain: No  Vision loss: No  Dry eyes: Yes  Watery eyes: No  Eye bulging: No  Double vision: No  Flashing of lights: Yes  Spots: Yes  Floaters: Yes  Redness: No  Crossed eyes: No  Tunnel Vision: No  Yellowing of eyes: No  Eye irritation: No  Back pain: Yes  Muscle aches: Yes  Neck pain: No  Swollen joints: No  Joint pain: Yes  Bone pain: No  Muscle cramps: Yes  Muscle weakness: Yes  Joint stiffness: Yes  Bone fracture: No        MEDICATIONS:        Medication Sig     escitalopram (LEXAPRO) 10 MG Take 1 tablet (10 mg) by mouth daily      "aspirin 81 MG tablet Take 1 tablet (81 mg) by mouth daily     oxyCODONE (ROXICODONE) 5 MG immediate release  Take 1 tablet (5 mg) by mouth every 4 hours as needed for pain     Levothyroxine Sodium 75 MCG  Take 75 mcg by mouth daily     IBANdronate (BONIVA) 150 MG  Take 1 tablet (150 mg) by mouth every 30 days     pravastatin (PRAVACHOL) 20 MG  Take 1 tablet (20 mg) by mouth daily     Cholecalciferol (VITAMIN D) 400 UNITS  1 tablet daily     atenolol (TENORMIN) 50 MG  Taking 0.5 tablet daily     omeprazole 20 MG tablet Take 1 tablet (20 mg) by mouth daily Take 30-60 minutes before a meal.     carboxymethylcellulose (REFRESH) 1 % ophthalmic solution Place 1 drop into both eyes 2 times daily as needed     naproxen sodium (ALEVE) 220 MG  Take 1 tablet by mouth 2 times daily (with meals).     HYDROcodone-acetaminophen 5-325 MG Take 1-2 tablets by mouth every 6 hours as needed for pain.         ALLERGIES: Vioxx      PHYSICAL EXAMINATION:      VITAL SIGNS:  Blood pressure 167/57, pulse 53, resp. rate 20, height 1.6 m (5' 3\"), SpO2 96 %,     GENERAL:  Ms. Ferrera is a pleasant  female who is well-groomed, well-developed and overweight, sitting comfortably in the exam room without any distress.  Affect is appropriate.      MOVEMENT DISORDERS ASSESSMENT:  Speech is slurred but understandable.  She has mild to moderate dysarthria.  Normal facial expression.  No rest tremor in all extremities.  Slight postural tremor bilaterally.  Mild action tremor bilaterally.  No rigidity in all extremities.  Finger tapping is normal.  Hand rapid alternating movements show mild slowing bilaterally.  Leg agility shows mild slowing bilaterally.  She was able to arise from a chair with arms folded across her chest with 1 attempt.  Posture is slightly stooped.  She did not have her walker today and attempted to walk without a walker by leaning against a wall.  Gait is slow and guarded.  She almost lost her balance and almost fell if not " caught by examiner.  She was assisted back to the room.  She reported left hip pain.  She had mild body bradykinesia.       DBS Interrogation & Analysis:      Implanted generator: Bilateral chest Activa-SC.  Lead placement: Bilateral Ventral Intermedius Nucleus of Thalamus (VIM).       Left VIM  C +, 0 -, 1 -  Volts: 2.4 volts  PW: 60 msec  Rate: 180 Hz      Therapy On: 100%  Battery Service Life: OK. 2.98 volts.      Patient :  Upper Limit = 2.8 volts  Lower Limit = 2.2 volts.   Electrode Impedance Check: (Amplitude 3.0 volts: PW 80 msec: Rate 100 Hz)   Left Lead: No Problems Found.      Right VIM  3 +, 1 -  Volts: 3.0 volts  PW: 90 msec  Rate: 130 Hz      Therapy On: 100 %  Battery Service Life: OK. 2.97 volts.      Patient :  Upper Limit: 3.0 volts  Lower Limit: 3.0 volts      Electrode Impedance Check: (Amplitude 3.0 volts: PW 80 msec: Rate 100 Hz)   Right Lead: No Problems Found.      See scanned report for impedance details.     ASSESSMENT/PLAN:         1.  Essential tremor.  Ms. Ferrera is an 85-year-old right-handed  female with a long-standing history of essential tremor, status post bilateral VIM and DBS implantation who came to the clinic for a follow up visit.  Overall tremor is well controlled.  She reports getting more improvement after her bilateral DBS generator replacement.       Her DBS interrogation and analysis shows normal impedance.  Battery life is good.       2.  Left hip pain.   Discussed about getting a second opinion from one of the [facility] providers.  I recommended getting her records from [facility] before being seen.  A referral was placed in Epic.       She will return to our clinic in 6 months for a follow up and may return sooner as needed.       The total time spent with the patient was 45 minutes, 20 minutes was in DBS interrogation and analysis and 25 minutes in addressing ET and left hip pain; 50% of the time was spent in counseling and  coordination of care.       IGNACIO Hanley, CNP  [facility]                D: 2017 13:09   T: 2017 14:13   MT: [id number]       Name:     RIANNA WALTERS   MRN:      [id number]        Account:      [id number]   :      1931           Service Date: 2017       Document: [id number]         ______________________________________      Patient was asked about 14 Review of systems including changes in vision (dry eyes, double vision), hearing, heart, lungs, musculoskeletal, depression, anxiety, snoring, RBD, insomnia, urinary frequency, urinary urgency, constipation, swallowing problems, hematological, ID, allergies, skin problems: seborrhea, endocrinological: thyroid, diabetes, cholesterol; balance, weight changes, and other neurological problems and these were not significant at this time except for   Patient Active Problem List   Diagnosis     Tremor     S/P brain surgery     Lymphedema     Anxiety     PSEUDOBULBAR AFFECT     Wears glasses     Hearing loss     Urinary incontinence, urge     Urinary urgency     Memory loss     Skin cancer     Hypercholesterolemia     Nocturia     Mood change (H)     Macular degeneration (senile) of retina     Recurrent falls     Osteoporosis     Dysarthria     Facial numbness     Tooth infection     Basal cell carcinoma of deltoid region, right     1St degree AV block     Low back pain          Allergies   Allergen Reactions     Vioxx      Pt. Is unaware of any allergy to this med.     Past Surgical History:   Procedure Laterality Date     CHOLECYSTECTOMY       Excision of basal cell carcinoma from RUE  Sep 2015    right shoulder     EXTRACAPSULAR CATARACT EXTRATION WITH INTRAOCULAR LENS IMPLANT       EXTRACAPSULAR CATARACT EXTRATION WITH INTRAOCULAR LENS IMPLANT       HYSTERECTOMY       REPLACE DEEP BRAIN STIMULATION GENERATOR / BATTERY BILATERAL Bilateral 2016    Procedure: REPLACE DEEP BRAIN STIMULATION GENERATOR / BATTERY BILATERAL;  Surgeon:  Bentley Martinez MD;  Location: [facility] OR     REPLACE PULSE GENERATOR SUBCUTANEOUS BILATERAL  29 Jul 2008    Replaced both soletra     REPLACE PULSE GENERATOR SUBCUTANEOUS BILATERAL  7/2/2013    Procedure: REPLACE PULSE GENERATOR SUBCUTANEOUS BILATERAL;  Bilateral Deep Brain Stimulator Battery Exchange ;  Surgeon: Bentley Martinez MD;  Location: [facility] OR     Right DBS Lead removal  jan 2003    infected right lead removed; was in a nursing home on antiobiotics     STEREOTACTIC INSERTION DEEP BRAIN STIMULATION  12 May 2003    placed lead in may after removal of right thalamic  DBS lead due to infection feb 17 2003     STEREOTACTIC INSERTION DEEP BRAIN STIMULATION  9 jun 2003    revision of right DBS electrode     STEREOTACTIC INSERTION DEEP BRAIN STIMULATION  oct 2002    left thalamic DBS lead placed     STEREOTACTIC INSERTION DEEP BRAIN STIMULATION  oct 2002    right thalamic DBS lead placed     Past Medical History:   Diagnosis Date     1St degree AV block 10/20/2015    ECG shows mild sinus bradycardia with 1st degree heart block and min voltage criteria for LVH; borderline ECG     Anxiety      Dysarthria      Facial numbness      Hearing loss      Hypercholesterolemia      Hypertension      Low back pain      Lymphedema      Macular degeneration (senile) of retina      Memory loss      Mood change (H)      Osteoporosis, unspecified      PSEUDOBULBAR AFFECT      Recurrent falls      Skin cancer      Tremor      Urinary incontinence, urge      Urinary urgency      Wears glasses      Social History     Social History     Marital status:      Spouse name: N/A     Number of children: N/A     Years of education: N/A     Occupational History     Not on file.     Social History Main Topics     Smoking status: Never Smoker     Smokeless tobacco: Never Used     Alcohol use No     Drug use: No     Sexual activity: Not on file     Other Topics Concern     Not on file     Social History Narrative    lives in  [place]. . Daughter Mary Ann Mijares.       Drug and lactation database from the [place] [employer]:  http://toxnet.nlm.nih.gov/cgi-bin/sis/htmlgen?LACT       .[email or web address]    B/P: Data Unavailable, T: Data Unavailable, P: Data Unavailable, R: Data Unavailable 0 lbs 0 oz  not currently breastfeeding., There is no height or weight on file to calculate BMI.  Medications and Vitals not listed above were documented in the cart and reviewed by me.     Current Outpatient Prescriptions   Medication Sig Dispense Refill     escitalopram (LEXAPRO) 10 MG tablet Take 1 tablet (10 mg) by mouth daily 90 tablet 3     aspirin 81 MG tablet Take 1 tablet (81 mg) by mouth daily 90 tablet 0     oxyCODONE (ROXICODONE) 5 MG immediate release tablet Take 1 tablet (5 mg) by mouth every 4 hours as needed for pain 18 tablet 0     Levothyroxine Sodium 75 MCG CAPS Take 75 mcg by mouth daily 30 capsule      IBANdronate (BONIVA) 150 MG tablet Take 1 tablet (150 mg) by mouth every 30 days 1 tablet 11     pravastatin (PRAVACHOL) 20 MG tablet Take 1 tablet (20 mg) by mouth daily 90 tablet 3     Cholecalciferol (VITAMIN D) 400 UNITS capsule 1 tablet daily 90 capsule 11     atenolol (TENORMIN) 50 MG tablet Taking 0.5 tablet daily 45 tablet 3     omeprazole 20 MG tablet Take 1 tablet (20 mg) by mouth daily Take 30-60 minutes before a meal. 90 tablet 3     carboxymethylcellulose (REFRESH) 1 % ophthalmic solution Place 1 drop into both eyes 2 times daily as needed 1 Bottle      naproxen sodium (ALEVE) 220 MG tablet Take 1 tablet by mouth 2 times daily (with meals).       HYDROcodone-acetaminophen 5-325 MG per tablet Take 1-2 tablets by mouth every 6 hours as needed for pain. 60 tablet 0         Iban Neves MD

## 2017-09-29 NOTE — TELEPHONE ENCOUNTER
Interrogated patient's DBS Activa SC battery. Patient has RVIM. All impedances were checked at 3.0 Volts and were WNL. See [employer] Neuromodulation sheets scanned. Patient informed.    Using contacts = 1-/3+    Model 04708    Battery voltage 2.96  ------------------------------------  Interrogated patient's DBS Activa SC battery. Patient has LSTN. All impedances were checked at 3.0 Volts and were WNL. See [employer] Neuromodulation sheets scanned. Patient informed.    Therapy impedance = 563 ohms, 4.193 mA    Using contacts = C+/0-/1-    Model 59102    Battery voltage 2.98      MRN [id number]  Diagnosis Tremor

## 2017-09-29 NOTE — LETTER
2017      RE: Jennifer Ferrera  [address]  [facility] [address]       Diagnosis/Summary/Recommendations:    PATIENT: Jennifer Ferrera  85 year old female     : 1931    ALLY: 2017    Et  dbs 14 yrs ago    Not walking much due to hip pain and feet are sore   Using a walker  Went grocery shopping this morning.    She has ongoing arthritis.       Has had more slurring  She has had a teeth issue  She can sleep all the time    bp was on the higher side today    2016 left ipg and right replaced by Dr. wilson    Over 50% of this visit was spent in patient care and care coordination.     History obtained from patient    Total visit time was 25 minutes    PLAN  Ongoing hip issues  Deep brain stimulation (DBS) is working  Has increased slurring  Has gait problems that is most likely multifactorial.   Will forego imaging at this time.  Last seen 6 months ago.   Will have her come back to see Daja in 1 year.       Iban Neves MD     ______________________________________    Last visit date and details:     Tremor     She still has slurring of speech     Has had hip pain and now has a lift chair that has helped her out.   Had been seen at [facility] in the past.   Still living in [place]     Taking atenolol for blood pressure.   Blood pressure machine was not working today but will be checked again.     Still taking lexapro     Taking boniva once month     On thyroid medication     On pravastatin     On omeprazole     Sleeping - 3 and 4 hours and then gets up to go to the bathroom.  She is sleeping a lot during the day.      She put on 10 lbs the last month and has increased swelling in her legs.      PLAN:     Probably would have her get bilateral ipg replaced - left is 2.59 and the right is getting lower  Referral to Dr. Collins     Return to see daja in 6 months     Iban neves MD            PATIENT: Jennifer Ferrera     : 1931     ALLY: 2017     REASON FOR VISIT:  Follow up  for essential tremor (ET) and DBS interrogation and analysis.       HISTORY OF PRESENT ILLNESS:  Ms. Jennifer Ferrera is an 85-year-old right-handed  female who came to the [facility] accompanied by her daughter for a follow up visit.  She was last seen in clinic on 09/13/2016 by Dr. Neves for a routine PD follow up visit.  I saw her last about a year ago.       The patient reports overall tremor is okay.  She had bilateral DBS generator replacement in 11/2016.  Since her battery replacement, she has noticed more tremor improvement.  She is able to do almost everything independently.  She continues to live by herself.  She drives to grocery stores and runs errands.  Her daughter helps her with the big groceries about once a month.  Otherwise, she is independent.  She reports when she writes, her right hand cramps.  She has not had any falls.       The patient reports bothersome left hip pain.  She has been seen by primary care as well as by [facility].  She reports that this has been an ongoing issue that is getting worse.  She has had 1 steroid injection without much benefit.  She has to use a walker, otherwise she feels unsteady on her feet due to hip giving out.  She has not had any falls.     Answers for HPI/ROS submitted by the patient on 3/21/2017   General Symptoms: No  Skin Symptoms: No  HENT Symptoms: Yes  EYE SYMPTOMS: Yes  HEART SYMPTOMS: No  LUNG SYMPTOMS: No  INTESTINAL SYMPTOMS: No  URINARY SYMPTOMS: No  GYNECOLOGIC SYMPTOMS: No  BREAST SYMPTOMS: No  SKELETAL SYMPTOMS: Yes  BLOOD SYMPTOMS: No  NERVOUS SYSTEM SYMPTOMS: No  MENTAL HEALTH SYMPTOMS: No  Ear pain: No  Ear discharge: No  Hearing loss: No  Tinnitus: No  Nosebleeds: No  Congestion: No  Sinus pain: No  Trouble swallowing: Yes   Voice hoarseness: Yes  Mouth sores: No  Sore throat: No  Tooth pain: No  Gum tenderness: No  Bleeding gums: No  Change in taste: No  Change in sense of smell: No  Dry mouth: Yes  Hearing aid used:  "Yes  Neck lump: No  Eye pain: No  Vision loss: No  Dry eyes: Yes  Watery eyes: No  Eye bulging: No  Double vision: No  Flashing of lights: Yes  Spots: Yes  Floaters: Yes  Redness: No  Crossed eyes: No  Tunnel Vision: No  Yellowing of eyes: No  Eye irritation: No  Back pain: Yes  Muscle aches: Yes  Neck pain: No  Swollen joints: No  Joint pain: Yes  Bone pain: No  Muscle cramps: Yes  Muscle weakness: Yes  Joint stiffness: Yes  Bone fracture: No        MEDICATIONS:        Medication Sig     escitalopram (LEXAPRO) 10 MG Take 1 tablet (10 mg) by mouth daily     aspirin 81 MG tablet Take 1 tablet (81 mg) by mouth daily     oxyCODONE (ROXICODONE) 5 MG immediate release  Take 1 tablet (5 mg) by mouth every 4 hours as needed for pain     Levothyroxine Sodium 75 MCG  Take 75 mcg by mouth daily     IBANdronate (BONIVA) 150 MG  Take 1 tablet (150 mg) by mouth every 30 days     pravastatin (PRAVACHOL) 20 MG  Take 1 tablet (20 mg) by mouth daily     Cholecalciferol (VITAMIN D) 400 UNITS  1 tablet daily     atenolol (TENORMIN) 50 MG  Taking 0.5 tablet daily     omeprazole 20 MG tablet Take 1 tablet (20 mg) by mouth daily Take 30-60 minutes before a meal.     carboxymethylcellulose (REFRESH) 1 % ophthalmic solution Place 1 drop into both eyes 2 times daily as needed     naproxen sodium (ALEVE) 220 MG  Take 1 tablet by mouth 2 times daily (with meals).     HYDROcodone-acetaminophen 5-325 MG Take 1-2 tablets by mouth every 6 hours as needed for pain.         ALLERGIES: Vioxx      PHYSICAL EXAMINATION:      VITAL SIGNS:  Blood pressure 167/57, pulse 53, resp. rate 20, height 1.6 m (5' 3\"), SpO2 96 %,     GENERAL:  Ms. Ferrera is a pleasant  female who is well-groomed, well-developed and overweight, sitting comfortably in the exam room without any distress.  Affect is appropriate.      MOVEMENT DISORDERS ASSESSMENT:  Speech is slurred but understandable.  She has mild to moderate dysarthria.  Normal facial expression.  No rest " tremor in all extremities.  Slight postural tremor bilaterally.  Mild action tremor bilaterally.  No rigidity in all extremities.  Finger tapping is normal.  Hand rapid alternating movements show mild slowing bilaterally.  Leg agility shows mild slowing bilaterally.  She was able to arise from a chair with arms folded across her chest with 1 attempt.  Posture is slightly stooped.  She did not have her walker today and attempted to walk without a walker by leaning against a wall.  Gait is slow and guarded.  She almost lost her balance and almost fell if not caught by examiner.  She was assisted back to the room.  She reported left hip pain.  She had mild body bradykinesia.       DBS Interrogation & Analysis:      Implanted generator: Bilateral chest Activa-SC.  Lead placement: Bilateral Ventral Intermedius Nucleus of Thalamus (VIM).       Left VIM  C +, 0 -, 1 -  Volts: 2.4 volts  PW: 60 msec  Rate: 180 Hz      Therapy On: 100%  Battery Service Life: OK. 2.98 volts.      Patient :  Upper Limit = 2.8 volts  Lower Limit = 2.2 volts.   Electrode Impedance Check: (Amplitude 3.0 volts: PW 80 msec: Rate 100 Hz)   Left Lead: No Problems Found.      Right VIM  3 +, 1 -  Volts: 3.0 volts  PW: 90 msec  Rate: 130 Hz      Therapy On: 100 %  Battery Service Life: OK. 2.97 volts.      Patient :  Upper Limit: 3.0 volts  Lower Limit: 3.0 volts      Electrode Impedance Check: (Amplitude 3.0 volts: PW 80 msec: Rate 100 Hz)   Right Lead: No Problems Found.      See scanned report for impedance details.     ASSESSMENT/PLAN:         1.  Essential tremor.  Ms. Ferrera is an 85-year-old right-handed  female with a long-standing history of essential tremor, status post bilateral VIM and DBS implantation who came to the clinic for a follow up visit.  Overall tremor is well controlled.  She reports getting more improvement after her bilateral DBS generator replacement.       Her DBS interrogation and analysis shows  normal impedance.  Battery life is good.       2.  Left hip pain.   Discussed about getting a second opinion from one of the [facility] providers.  I recommended getting her records from [facility] before being seen.  A referral was placed in Epic.       She will return to our clinic in 6 months for a follow up and may return sooner as needed.       The total time spent with the patient was 45 minutes, 20 minutes was in DBS interrogation and analysis and 25 minutes in addressing ET and left hip pain; 50% of the time was spent in counseling and coordination of care.       IGNACIO Hanley, CNP  [facility]                D: 2017 13:09   T: 2017 14:13   MT: [id number]       Name:     RIANNA WALTERS   MRN:      [id number]        Account:      [id number]   :      1931           Service Date: 2017       Document: [id number]         ______________________________________      Patient was asked about 14 Review of systems including changes in vision (dry eyes, double vision), hearing, heart, lungs, musculoskeletal, depression, anxiety, snoring, RBD, insomnia, urinary frequency, urinary urgency, constipation, swallowing problems, hematological, ID, allergies, skin problems: seborrhea, endocrinological: thyroid, diabetes, cholesterol; balance, weight changes, and other neurological problems and these were not significant at this time except for   Patient Active Problem List   Diagnosis     Tremor     S/P brain surgery     Lymphedema     Anxiety     PSEUDOBULBAR AFFECT     Wears glasses     Hearing loss     Urinary incontinence, urge     Urinary urgency     Memory loss     Skin cancer     Hypercholesterolemia     Nocturia     Mood change (H)     Macular degeneration (senile) of retina     Recurrent falls     Osteoporosis     Dysarthria     Facial numbness     Tooth infection     Basal cell carcinoma of deltoid region, right     1St degree AV block     Low back pain           Allergies   Allergen Reactions     Vioxx      Pt. Is unaware of any allergy to this med.     Past Surgical History:   Procedure Laterality Date     CHOLECYSTECTOMY       Excision of basal cell carcinoma from RUE  Sep 2015    right shoulder     EXTRACAPSULAR CATARACT EXTRATION WITH INTRAOCULAR LENS IMPLANT       EXTRACAPSULAR CATARACT EXTRATION WITH INTRAOCULAR LENS IMPLANT       HYSTERECTOMY       REPLACE DEEP BRAIN STIMULATION GENERATOR / BATTERY BILATERAL Bilateral 11/8/2016    Procedure: REPLACE DEEP BRAIN STIMULATION GENERATOR / BATTERY BILATERAL;  Surgeon: Bentley Martinez MD;  Location: [facility] OR     REPLACE PULSE GENERATOR SUBCUTANEOUS BILATERAL  29 Jul 2008    Replaced both soletra     REPLACE PULSE GENERATOR SUBCUTANEOUS BILATERAL  7/2/2013    Procedure: REPLACE PULSE GENERATOR SUBCUTANEOUS BILATERAL;  Bilateral Deep Brain Stimulator Battery Exchange ;  Surgeon: Bentley Martinez MD;  Location: [facility] OR     Right DBS Lead removal  jan 2003    infected right lead removed; was in a nursing home on antiobiotics     STEREOTACTIC INSERTION DEEP BRAIN STIMULATION  12 May 2003    placed lead in may after removal of right thalamic  DBS lead due to infection feb 17 2003     STEREOTACTIC INSERTION DEEP BRAIN STIMULATION  9 jun 2003    revision of right DBS electrode     STEREOTACTIC INSERTION DEEP BRAIN STIMULATION  oct 2002    left thalamic DBS lead placed     STEREOTACTIC INSERTION DEEP BRAIN STIMULATION  oct 2002    right thalamic DBS lead placed     Past Medical History:   Diagnosis Date     1St degree AV block 10/20/2015    ECG shows mild sinus bradycardia with 1st degree heart block and min voltage criteria for LVH; borderline ECG     Anxiety      Dysarthria      Facial numbness      Hearing loss      Hypercholesterolemia      Hypertension      Low back pain      Lymphedema      Macular degeneration (senile) of retina      Memory loss      Mood change (H)      Osteoporosis, unspecified      PSEUDOBULBAR  AFFECT      Recurrent falls      Skin cancer      Tremor      Urinary incontinence, urge      Urinary urgency      Wears glasses      Social History     Social History     Marital status:      Spouse name: N/A     Number of children: N/A     Years of education: N/A     Occupational History     Not on file.     Social History Main Topics     Smoking status: Never Smoker     Smokeless tobacco: Never Used     Alcohol use No     Drug use: No     Sexual activity: Not on file     Other Topics Concern     Not on file     Social History Narrative    lives in [place]. . Daughter Mary Ann Mijares.       Drug and lactation database from the [place] National Library of Medicine:  http://toxnet.nlm.nih.gov/cgi-bin/sis/htmlgen?LACT       .MDSPDTABLE    B/P: Data Unavailable, T: Data Unavailable, P: Data Unavailable, R: Data Unavailable 0 lbs 0 oz  not currently breastfeeding., There is no height or weight on file to calculate BMI.  Medications and Vitals not listed above were documented in the cart and reviewed by me.     Current Outpatient Prescriptions   Medication Sig Dispense Refill     escitalopram (LEXAPRO) 10 MG tablet Take 1 tablet (10 mg) by mouth daily 90 tablet 3     aspirin 81 MG tablet Take 1 tablet (81 mg) by mouth daily 90 tablet 0     oxyCODONE (ROXICODONE) 5 MG immediate release tablet Take 1 tablet (5 mg) by mouth every 4 hours as needed for pain 18 tablet 0     Levothyroxine Sodium 75 MCG CAPS Take 75 mcg by mouth daily 30 capsule      IBANdronate (BONIVA) 150 MG tablet Take 1 tablet (150 mg) by mouth every 30 days 1 tablet 11     pravastatin (PRAVACHOL) 20 MG tablet Take 1 tablet (20 mg) by mouth daily 90 tablet 3     Cholecalciferol (VITAMIN D) 400 UNITS capsule 1 tablet daily 90 capsule 11     atenolol (TENORMIN) 50 MG tablet Taking 0.5 tablet daily 45 tablet 3     omeprazole 20 MG tablet Take 1 tablet (20 mg) by mouth daily Take 30-60 minutes before a meal. 90 tablet 3      carboxymethylcellulose (REFRESH) 1 % ophthalmic solution Place 1 drop into both eyes 2 times daily as needed 1 Bottle      naproxen sodium (ALEVE) 220 MG tablet Take 1 tablet by mouth 2 times daily (with meals).       HYDROcodone-acetaminophen 5-325 MG per tablet Take 1-2 tablets by mouth every 6 hours as needed for pain. 60 tablet 0         Iban Neves MD

## 2017-09-29 NOTE — MR AVS SNAPSHOT
After Visit Summary   9/29/2017    Jennifer Ferrrea    MRN: [id number]           Patient Information     Date Of Birth          11/23/1931        Visit Information        Provider Department      9/29/2017 2:15 PM Iban Neves MD [facility]        Today's Diagnoses     Essential tremor    -  1       Follow-ups after your visit        Follow-up notes from your care team     Return in about 1 year (around 9/29/2018).      Your next 10 appointments already scheduled     Sep 12, 2018 10:10 AM CDT   (Arrive by 9:55 AM)   Return Movement Disorder with IGNACIO Green CNP   [facility] ([facility])    [address] [address] [phone]              Who to contact     Please call your clinic at [phone] to:    Ask questions about your health    Make or cancel appointments    Discuss your medicines    Learn about your test results    Speak to your doctor   If you have compliments or concerns about an experience at your clinic, or if you wish to file a complaint, please contact [facility] Patient Relations at [phone] or email us at [email or web address]         Additional Information About Your Visit        MyChart Information     [employer] gives you secure access to your electronic health record. If you see a primary care provider, you can also send messages to your care team and make appointments. If you have questions, please call your primary care clinic.  If you do not have a primary care provider, please call [phone] and they will assist you.      [employer] is an electronic gateway that provides easy, online access to your medical records. With [employer], you can request a clinic appointment, read your test results, renew a prescription or communicate with your care team.     To access your existing account, please contact your [facility] or  "call [phone] for assistance.        Care EveryWhere ID     This is your Care EveryWhere ID. This could be used by other organizations to access your [facility] medical records  [id number]        Your Vitals Were     Pulse Height BMI (Body Mass Index)             59 1.6 m (5' 3\") 37.27 kg/m2          Blood Pressure from Last 3 Encounters:   09/29/17 186/63   03/21/17 167/57   11/22/16 180/56    Weight from Last 3 Encounters:   09/29/17 95.4 kg (210 lb 6.4 oz)   03/30/17 93.4 kg (206 lb)   03/29/17 93.4 kg (206 lb)              Today, you had the following     No orders found for display         Today's Medication Changes          These changes are accurate as of: 9/29/17  2:37 PM.  If you have any questions, ask your nurse or doctor.               Stop taking these medicines if you haven't already. Please contact your care team if you have questions.     oxyCODONE 5 MG IR tablet   Commonly known as:  ROXICODONE   Stopped by:  Iban Neves MD                    Primary Care Provider Office Phone # Fax #    Michelle Jennifer Carvajal [phone]       [facility] [place] [address]        Equal Access to Services     JALYN FERNANDEZ AH: Hadii floyd peña hadasho Soomaali, waaxda luqadaha, qaybta kaalmada adeegyada, damon finch. So [facility] [phone].    ATENCIÓN: Si habla español, tiene a jarquin disposición servicios gratuitos de asistencia lingüística. Llame al [phone].    We comply with applicable federal civil rights laws and [place] laws. We do not discriminate on the basis of race, color, national origin, age, disability, sex, sexual orientation, or gender identity.            Thank you!     Thank you for choosing [facility]  for your care. Our goal is always to provide you with excellent care. Hearing back from our patients is one way we can continue to improve our services. Please take a few minutes to complete the written survey that you may receive " in the mail after your visit with us. Thank you!             Your Updated Medication List - Protect others around you: Learn how to safely use, store and throw away your medicines at www.disposemymeds.org.          This list is accurate as of: 9/29/17  2:37 PM.  Always use your most recent med list.                   Brand Name Dispense Instructions for use Diagnosis    ALEVE 220 MG tablet   Generic drug:  naproxen sodium      Take 1 tablet by mouth 2 times daily (with meals).        aspirin 81 MG tablet     90 tablet    Take 1 tablet (81 mg) by mouth daily    Tremor       atenolol 50 MG tablet    TENORMIN    45 tablet    Taking 0.5 tablet daily    Elevated blood pressure       escitalopram 10 MG tablet    LEXAPRO    90 tablet    Take 1 tablet (10 mg) by mouth daily    Depression       IBANdronate 150 MG tablet    BONIVA    1 tablet    Take 1 tablet (150 mg) by mouth every 30 days    Essential tremor       Levothyroxine Sodium 75 MCG Caps     30 capsule    Take 75 mcg by mouth daily        omeprazole 20 MG tablet     90 tablet    Take 1 tablet (20 mg) by mouth daily Take 30-60 minutes before a meal.        * oxybutynin 5 MG tablet    DITROPAN    360 tablet    Take 1 tablet (5 mg) by mouth daily        * DITROPAN XL 5 MG 24 hr tablet   Generic drug:  oxybutynin     90 tablet    Take 1 tablet (5 mg) by mouth daily        pravastatin 20 MG tablet    PRAVACHOL    90 tablet    Take 1 tablet (20 mg) by mouth daily        REFRESH 1 % ophthalmic solution   Generic drug:  carboxymethylcellulose     1 Bottle    Place 1 drop into both eyes 2 times daily as needed        vitamin D 400 UNITS capsule     90 capsule    1 tablet daily        * Notice:  This list has 2 medication(s) that are the same as other medications prescribed for you. Read the directions carefully, and ask your doctor or other care provider to review them with you.

## 2017-09-29 NOTE — Clinical Note
9/29/2017       RE: Jennifer Ferrera  [address]  [facility] [address]     Dear Colleague,    Thank you for referring your patient, Jennifer Ferrera, to the [facility] at [facility]. Please see a copy of my visit note below.    No notes on file    Again, thank you for allowing me to participate in the care of your patient.      Sincerely,    Iban Neves MD

## 2017-12-11 DIAGNOSIS — F43.21 ADJUSTMENT DISORDER WITH DEPRESSED MOOD: Primary | ICD-10-CM

## 2017-12-11 DIAGNOSIS — F32.A DEPRESSION: ICD-10-CM

## 2017-12-11 RX ORDER — ESCITALOPRAM OXALATE 10 MG/1
TABLET ORAL
Qty: 90 TABLET | Refills: 3 | Status: SHIPPED | OUTPATIENT
Start: 2017-12-11 | End: 2018-10-25

## 2018-04-25 ENCOUNTER — RECORDS - HEALTHEAST (OUTPATIENT)
Dept: LAB | Facility: CLINIC | Age: 83
End: 2018-04-25

## 2018-04-25 LAB
ANION GAP SERPL CALCULATED.3IONS-SCNC: 11 MMOL/L (ref 5–18)
BUN SERPL-MCNC: 12 MG/DL (ref 8–28)
CALCIUM SERPL-MCNC: 8.7 MG/DL (ref 8.5–10.5)
CHLORIDE BLD-SCNC: 102 MMOL/L (ref 98–107)
CHOLEST SERPL-MCNC: 152 MG/DL
CO2 SERPL-SCNC: 25 MMOL/L (ref 22–31)
CREAT SERPL-MCNC: 0.84 MG/DL (ref 0.6–1.1)
FASTING STATUS PATIENT QL REPORTED: NO
GFR SERPL CREATININE-BSD FRML MDRD: >60 ML/MIN/1.73M2
GLUCOSE BLD-MCNC: 81 MG/DL (ref 70–125)
HDLC SERPL-MCNC: 41 MG/DL
LDLC SERPL CALC-MCNC: 90 MG/DL
POTASSIUM BLD-SCNC: 4.3 MMOL/L (ref 3.5–5)
SODIUM SERPL-SCNC: 138 MMOL/L (ref 136–145)
TRIGL SERPL-MCNC: 107 MG/DL
TSH SERPL DL<=0.005 MIU/L-ACNC: 1.57 UIU/ML (ref 0.3–5)

## 2018-10-23 NOTE — PROGRESS NOTES
Diagnosis/Summary/Recommendations:    PATIENT: Jennifer Ferrera  86 year old female     : 1931    ALLY: 2018    Essential Tremor  DBS 15 yrs ago    No short circuits and battery is fine.     Had ambulance come to house due to bleeding. Did not go to the emergency room   800 dollars    Battery is doing well.         Medications     6-9am 8am 8pm   ASpirin 81mg  1    Atenolol tenormin 50mg Not taking     Carboxymethylcellulose refresh 1% opthalmic solution      Cholecalciferol vitamin d 400 or 1000 units   1    Escitalopram lexapro 20mg   1    Furosemide lasix 40mg  1    Ibandronate boniva 150mg 21st of every month     Levothyroxine sodium 75mcg 1     Naproxen aleve 220mg  1 1   Omeprazole 20mg  1    Oxybutynin ditropan XL 5mg 24 hr tablet  1    Oxybutynin ditropan 5mg tablet  Not taking    Pravastatin pravachol 20mg   1                                History obtained from patient     No change in medication regimen  Battery is fine  She continues to live independently with someone who comes in every few weeks to clean and to help out with her feet care.     Return back in one year.       Coding statement:   Duration of  Services: patient care and care coordination was 25 minutes  Greater than 50% of this visit was spent in counseling and coordination of care.     Iban Neves MD     ______________________________________    Last visit date and details:      ALLY: 2017     Et  dbs 14 yrs ago     Not walking much due to hip pain and feet are sore   Using a walker  Went grocery shopping this morning.     She has ongoing arthritis.         Has had more slurring  She has had a teeth issue  She can sleep all the time     bp was on the higher side today     2016 left ipg and right replaced by Dr. wilson     Over 50% of this visit was spent in patient care and care coordination.      History obtained from patient     Total visit time was 25 minutes     PLAN  Ongoing hip issues  Deep brain  stimulation (DBS) is working  Has increased slurring  Has gait problems that is most likely multifactorial.   Will forego imaging at this time.  Last seen 6 months ago.   Will have her come back to see Daja in 1 year.         ______________________________________      Patient was asked about 14 Review of systems including changes in vision (dry eyes, double vision), hearing, heart, lungs, musculoskeletal, depression, anxiety, snoring, RBD, insomnia, urinary frequency, urinary urgency, constipation, swallowing problems, hematological, ID, allergies, skin problems: seborrhea, endocrinological: thyroid, diabetes, cholesterol; balance, weight changes, and other neurological problems and these were not significant at this time except for   Patient Active Problem List   Diagnosis     Tremor     S/P brain surgery     Lymphedema     Anxiety     PSEUDOBULBAR AFFECT     Wears glasses     Hearing loss     Urinary incontinence, urge     Urinary urgency     Memory loss     Skin cancer     Hypercholesterolemia     Nocturia     Mood change     Macular degeneration (senile) of retina     Recurrent falls     Osteoporosis     Dysarthria     Facial numbness     Tooth infection     Basal cell carcinoma of deltoid region, right     1st degree AV block     Low back pain          Allergies   Allergen Reactions     Vioxx      Pt. Is unaware of any allergy to this med.     Past Surgical History:   Procedure Laterality Date     CHOLECYSTECTOMY       Excision of basal cell carcinoma from RUE  Sep 2015    right shoulder     EXTRACAPSULAR CATARACT EXTRATION WITH INTRAOCULAR LENS IMPLANT       EXTRACAPSULAR CATARACT EXTRATION WITH INTRAOCULAR LENS IMPLANT       HYSTERECTOMY       REPLACE DEEP BRAIN STIMULATION GENERATOR / BATTERY BILATERAL Bilateral 11/8/2016    Procedure: REPLACE DEEP BRAIN STIMULATION GENERATOR / BATTERY BILATERAL;  Surgeon: Bentley Martinez MD;  Location: [facility] OR     REPLACE PULSE GENERATOR SUBCUTANEOUS BILATERAL  29  Jul 2008    Replaced both soletra     REPLACE PULSE GENERATOR SUBCUTANEOUS BILATERAL  7/2/2013    Procedure: REPLACE PULSE GENERATOR SUBCUTANEOUS BILATERAL;  Bilateral Deep Brain Stimulator Battery Exchange ;  Surgeon: Bentley Martinez MD;  Location: [facility] OR     Right DBS Lead removal  jan 2003    infected right lead removed; was in a nursing home on antiobiotics     STEREOTACTIC INSERTION DEEP BRAIN STIMULATION  12 May 2003    placed lead in may after removal of right thalamic  DBS lead due to infection feb 17 2003     STEREOTACTIC INSERTION DEEP BRAIN STIMULATION  9 jun 2003    revision of right DBS electrode     STEREOTACTIC INSERTION DEEP BRAIN STIMULATION  oct 2002    left thalamic DBS lead placed     STEREOTACTIC INSERTION DEEP BRAIN STIMULATION  oct 2002    right thalamic DBS lead placed     Past Medical History:   Diagnosis Date     1St degree AV block 10/20/2015    ECG shows mild sinus bradycardia with 1st degree heart block and min voltage criteria for LVH; borderline ECG     Anxiety      Dysarthria      Facial numbness      Hearing loss      Hypercholesterolemia      Hypertension      Low back pain      Lymphedema      Macular degeneration (senile) of retina      Memory loss      Mood change (H)      Osteoporosis, unspecified      PSEUDOBULBAR AFFECT      Recurrent falls      Skin cancer      Tremor      Urinary incontinence, urge      Urinary urgency      Wears glasses      Social History     Social History     Marital status:      Spouse name: N/A     Number of children: N/A     Years of education: N/A     Occupational History     Not on file.     Social History Main Topics     Smoking status: Never Smoker     Smokeless tobacco: Never Used     Alcohol use No     Drug use: No     Sexual activity: Not on file     Other Topics Concern     Not on file     Social History Narrative    lives in [place]. . Daughter Mary Ann Mijares.       Drug and lactation database from the [facility]  National Library of Medicine:  http://toxnet.nlm.nih.gov/cgi-bin/sis/htmlgen?LACT      B/P: Data Unavailable, T: Data Unavailable, P: Data Unavailable, R: Data Unavailable 0 lbs 0 oz  not currently breastfeeding., There is no height or weight on file to calculate BMI.  Medications and Vitals not listed above were documented in the cart and reviewed by me.     Current Outpatient Prescriptions   Medication Sig Dispense Refill     aspirin 81 MG tablet Take 1 tablet (81 mg) by mouth daily 90 tablet 0     atenolol (TENORMIN) 50 MG tablet Taking 0.5 tablet daily 45 tablet 3     carboxymethylcellulose (REFRESH) 1 % ophthalmic solution Place 1 drop into both eyes 2 times daily as needed 1 Bottle      Cholecalciferol (VITAMIN D) 400 UNITS capsule 1 tablet daily 90 capsule 11     escitalopram (LEXAPRO) 10 MG tablet TAKE 1 TABLET(10 MG) BY MOUTH DAILY 90 tablet 3     IBANdronate (BONIVA) 150 MG tablet Take 1 tablet (150 mg) by mouth every 30 days 1 tablet 11     Levothyroxine Sodium 75 MCG CAPS Take 75 mcg by mouth daily 30 capsule      naproxen sodium (ALEVE) 220 MG tablet Take 1 tablet by mouth 2 times daily (with meals).       omeprazole 20 MG tablet Take 1 tablet (20 mg) by mouth daily Take 30-60 minutes before a meal. 90 tablet 3     oxybutynin (DITROPAN XL) 5 MG 24 hr tablet Take 1 tablet (5 mg) by mouth daily 90 tablet 3     oxybutynin (DITROPAN) 5 MG tablet Take 1 tablet (5 mg) by mouth daily 360 tablet 0     pravastatin (PRAVACHOL) 20 MG tablet Take 1 tablet (20 mg) by mouth daily 90 tablet 3         Iban Neves MD

## 2018-10-25 ENCOUNTER — OFFICE VISIT (OUTPATIENT)
Dept: NEUROLOGY | Facility: CLINIC | Age: 83
End: 2018-10-25
Payer: COMMERCIAL

## 2018-10-25 VITALS
BODY MASS INDEX: 37.27 KG/M2 | OXYGEN SATURATION: 98 % | HEIGHT: 63 IN | HEART RATE: 57 BPM | SYSTOLIC BLOOD PRESSURE: 181 MMHG | DIASTOLIC BLOOD PRESSURE: 67 MMHG

## 2018-10-25 DIAGNOSIS — G25.0 ESSENTIAL TREMOR: Primary | ICD-10-CM

## 2018-10-25 RX ORDER — ESCITALOPRAM OXALATE 20 MG/1
10 TABLET ORAL DAILY
Qty: 90 TABLET | Refills: 3 | COMMUNITY
Start: 2018-10-25 | End: 2020-12-11

## 2018-10-25 RX ORDER — ESCITALOPRAM OXALATE 20 MG/1
TABLET ORAL
Refills: 3 | COMMUNITY
Start: 2018-09-18 | End: 2018-10-25

## 2018-10-25 RX ORDER — FUROSEMIDE 40 MG
TABLET ORAL EVERY MORNING
Refills: 1 | COMMUNITY
Start: 2018-09-17

## 2018-10-25 NOTE — MR AVS SNAPSHOT
After Visit Summary   10/25/2018    Jennifer Ferrera    MRN: [id number]           Patient Information     Date Of Birth          1931        Visit Information        Provider Department      10/25/2018 2:10 PM Iban Neves MD [facility] Neurology        Today's Diagnoses     Essential tremor    -  1      Care Instructions    : 1931    ALLY: 2018    Essential Tremor  DBS 15 yrs ago    No short circuits and battery is fine.     Had ambulance come to house due to bleeding. Did not go to the emergency room   800 dollars    Battery is doing well.         Medications     6-9am 8am 8pm   ASpirin 81mg  1    Atenolol tenormin 50mg Not taking     Carboxymethylcellulose refresh 1% opthalmic solution      Cholecalciferol vitamin d 400 or 1000 units   1    Escitalopram lexapro 20mg   1    Furosemide lasix 40mg  1    Ibandronate boniva 150mg 21st of every month     Levothyroxine sodium 75mcg 1     Naproxen aleve 220mg  1 1   Omeprazole 20mg  1    Oxybutynin ditropan XL 5mg 24 hr tablet  1    Oxybutynin ditropan 5mg tablet  Not taking    Pravastatin pravachol 20mg   1                                History obtained from patient     No change in medication regimen  Battery is fine  She continues to live independently with someone who comes in every few weeks to clean and to help out with her feet care.     Return back in one year.             Follow-ups after your visit        Follow-up notes from your care team     Return in about 1 year (around 10/25/2019).      Your next 10 appointments already scheduled     Oct 22, 2019 10:10 AM CDT   (Arrive by 9:55 AM)   Return Movement Disorder with Iban Neves MD   [facility] ([facility])    [address] [place] [id number] [phone]              Who to contact     Please call your clinic at [phone] to:    Ask questions about your health    Make or cancel  "appointments    Discuss your medicines    Learn about your test results    Speak to your doctor            Additional Information About Your Visit        Bin1 ATEharSpreadsave Information     [employer] gives you secure access to your electronic health record. If you see a primary care provider, you can also send messages to your care team and make appointments. If you have questions, please call your primary care clinic.  If you do not have a primary care provider, please call [phone] and they will assist you.      [employer] is an electronic gateway that provides easy, online access to your medical records. With [employer], you can request a clinic appointment, read your test results, renew a prescription or communicate with your care team.     To access your existing account, please contact your [facility] or call [phone] for assistance.        Care EveryWhere ID     This is your Care EveryWhere ID. This could be used by other organizations to access your [place] medical records  [id number]        Your Vitals Were     Pulse Height Pulse Oximetry BMI (Body Mass Index)          57 1.6 m (5' 3\") 98% 37.27 kg/m2         Blood Pressure from Last 3 Encounters:   10/25/18 181/67   09/29/17 186/63   03/21/17 167/57    Weight from Last 3 Encounters:   09/29/17 95.4 kg (210 lb 6.4 oz)   03/30/17 93.4 kg (206 lb)   03/29/17 93.4 kg (206 lb)              Today, you had the following     No orders found for display         Today's Medication Changes          These changes are accurate as of 10/25/18  2:30 PM.  If you have any questions, ask your nurse or doctor.               These medicines have changed or have updated prescriptions.        Dose/Directions    cholecalciferol 1000 units capsule   Commonly known as:  vitamin  -D   This may have changed:  Another medication with the same name was removed. Continue taking this medication, and follow the directions you see here.   Used for:  Essential tremor "   Changed by:  Iban Neves MD        1000 units by mouth daily (vitamin D/cholecalciferol)   Quantity:  90 capsule   Refills:  3       DITROPAN XL 5 MG 24 hr tablet   This may have changed:  Another medication with the same name was removed. Continue taking this medication, and follow the directions you see here.   Generic drug:  oxybutynin   Changed by:  Iban Neves MD        Dose:  5 mg   Take 1 tablet (5 mg) by mouth daily   Quantity:  90 tablet   Refills:  3       escitalopram 20 MG tablet   Commonly known as:  LEXAPRO   This may have changed:  Another medication with the same name was removed. Continue taking this medication, and follow the directions you see here.   Changed by:  Iban Neves MD        20mg tab by mouth daily   Quantity:  90 tablet   Refills:  3         Stop taking these medicines if you haven't already. Please contact your care team if you have questions.     atenolol 50 MG tablet   Commonly known as:  TENORMIN   Stopped by:  Iban Neves MD                Where to get your medicines      These medications were sent to [id number] - [place], [place] - [address] [facility]  [address], [place] [address]     Phone:  [phone]     cholecalciferol 1000 units capsule                Primary Care Provider Office Phone # Fax #    Michelle Carvajal [phone]       [facility] [address]  [facility] [address]        Equal Access to Services     [facility] CenterFRANNY AH: Hadii floyd yao Sogeno, waaxda luqadaha, qaybta kaalmada adeegyada, damon finch. So [facility] [phone].    ATENCIÓN: Si habla español, tiene a jarquin disposición servicios gratuitos de asistencia lingüística. Becky al [phone].    We comply with applicable federal civil rights laws and [place] laws. We do not discriminate on the basis of race, color, national origin, age,  disability, sex, sexual orientation, or gender identity.            Thank you!     Thank you for choosing [facility] NEUROLOGY  for your care. Our goal is always to provide you with excellent care. Hearing back from our patients is one way we can continue to improve our services. Please take a few minutes to complete the written survey that you may receive in the mail after your visit with us. Thank you!             Your Updated Medication List - Protect others around you: Learn how to safely use, store and throw away your medicines at www.disposemymeds.org.          This list is accurate as of 10/25/18  2:30 PM.  Always use your most recent med list.                   Brand Name Dispense Instructions for use Diagnosis    ALEVE 220 MG tablet   Generic drug:  naproxen sodium      Take 1 tablet by mouth 2 times daily (with meals).        aspirin 81 MG tablet     90 tablet    Take 1 tablet (81 mg) by mouth daily    Tremor       cholecalciferol 1000 units capsule    vitamin  -D    90 capsule    1000 units by mouth daily (vitamin D/cholecalciferol)    Essential tremor       DITROPAN XL 5 MG 24 hr tablet   Generic drug:  oxybutynin     90 tablet    Take 1 tablet (5 mg) by mouth daily        escitalopram 20 MG tablet    LEXAPRO    90 tablet    20mg tab by mouth daily        furosemide 40 MG tablet    LASIX     40mg tab by mouth daily        IBANdronate 150 MG tablet    BONIVA    1 tablet    Take 1 tablet (150 mg) by mouth every 30 days    Essential tremor       Levothyroxine Sodium 75 MCG Caps     30 capsule    Take 75 mcg by mouth daily        omeprazole 20 MG tablet     90 tablet    Take 1 tablet (20 mg) by mouth daily Take 30-60 minutes before a meal.        pravastatin 20 MG tablet    PRAVACHOL    90 tablet    Take 1 tablet (20 mg) by mouth daily        REFRESH 1 % ophthalmic solution   Generic drug:  carboxymethylcellulose     1 Bottle    Place 1 drop into both eyes 2 times daily as needed

## 2018-10-25 NOTE — LETTER
10/25/2018      RE: Jennifer Ferrera  [address]  [facility] [address]       Diagnosis/Summary/Recommendations:    PATIENT: Jennifer Ferrera  86 year old female     : 1931    ALLY: 2018    Essential Tremor  DBS 15 yrs ago    No short circuits and battery is fine.     Had ambulance come to house due to bleeding. Did not go to the emergency room   800 dollars    Battery is doing well.         Medications     6-9am 8am 8pm   ASpirin 81mg  1    Atenolol tenormin 50mg Not taking     Carboxymethylcellulose refresh 1% opthalmic solution      Cholecalciferol vitamin d 400 or 1000 units   1    Escitalopram lexapro 20mg   1    Furosemide lasix 40mg  1    Ibandronate boniva 150mg 21st of every month     Levothyroxine sodium 75mcg 1     Naproxen aleve 220mg  1 1   Omeprazole 20mg  1    Oxybutynin ditropan XL 5mg 24 hr tablet  1    Oxybutynin ditropan 5mg tablet  Not taking    Pravastatin pravachol 20mg   1                                History obtained from patient     No change in medication regimen  Battery is fine  She continues to live independently with someone who comes in every few weeks to clean and to help out with her feet care.     Return back in one year.       Coding statement:   Duration of  Services: patient care and care coordination was 25 minutes  Greater than 50% of this visit was spent in counseling and coordination of care.     Iban Neves MD     ______________________________________    Last visit date and details:      ALLY: 2017     Et  dbs 14 yrs ago     Not walking much due to hip pain and feet are sore   Using a walker  Went grocery shopping this morning.     She has ongoing arthritis.         Has had more slurring  She has had a teeth issue  She can sleep all the time     bp was on the higher side today     2016 left ipg and right replaced by Dr. wilson     Over 50% of this visit was spent in patient care and care coordination.      History obtained from  patient     Total visit time was 25 minutes     PLAN  Ongoing hip issues  Deep brain stimulation (DBS) is working  Has increased slurring  Has gait problems that is most likely multifactorial.   Will forego imaging at this time.  Last seen 6 months ago.   Will have her come back to see Daja in 1 year.         ______________________________________      Patient was asked about 14 Review of systems including changes in vision (dry eyes, double vision), hearing, heart, lungs, musculoskeletal, depression, anxiety, snoring, RBD, insomnia, urinary frequency, urinary urgency, constipation, swallowing problems, hematological, ID, allergies, skin problems: seborrhea, endocrinological: thyroid, diabetes, cholesterol; balance, weight changes, and other neurological problems and these were not significant at this time except for   Patient Active Problem List   Diagnosis     Tremor     S/P brain surgery     Lymphedema     Anxiety     PSEUDOBULBAR AFFECT     Wears glasses     Hearing loss     Urinary incontinence, urge     Urinary urgency     Memory loss     Skin cancer     Hypercholesterolemia     Nocturia     Mood change     Macular degeneration (senile) of retina     Recurrent falls     Osteoporosis     Dysarthria     Facial numbness     Tooth infection     Basal cell carcinoma of deltoid region, right     1st degree AV block     Low back pain          Allergies   Allergen Reactions     Vioxx      Pt. Is unaware of any allergy to this med.     Past Surgical History:   Procedure Laterality Date     CHOLECYSTECTOMY       Excision of basal cell carcinoma from RUE  Sep 2015    right shoulder     EXTRACAPSULAR CATARACT EXTRATION WITH INTRAOCULAR LENS IMPLANT       EXTRACAPSULAR CATARACT EXTRATION WITH INTRAOCULAR LENS IMPLANT       HYSTERECTOMY       REPLACE DEEP BRAIN STIMULATION GENERATOR / BATTERY BILATERAL Bilateral 11/8/2016    Procedure: REPLACE DEEP BRAIN STIMULATION GENERATOR / BATTERY BILATERAL;  Surgeon: Bentley Martinez  MD Pb;  Location: [facility] OR     REPLACE PULSE GENERATOR SUBCUTANEOUS BILATERAL  29 Jul 2008    Replaced both soletra     REPLACE PULSE GENERATOR SUBCUTANEOUS BILATERAL  7/2/2013    Procedure: REPLACE PULSE GENERATOR SUBCUTANEOUS BILATERAL;  Bilateral Deep Brain Stimulator Battery Exchange ;  Surgeon: Bentley Martinez MD;  Location: [facility] OR     Right DBS Lead removal  jan 2003    infected right lead removed; was in a nursing home on antiobiotics     STEREOTACTIC INSERTION DEEP BRAIN STIMULATION  12 May 2003    placed lead in may after removal of right thalamic  DBS lead due to infection feb 17 2003     STEREOTACTIC INSERTION DEEP BRAIN STIMULATION  9 jun 2003    revision of right DBS electrode     STEREOTACTIC INSERTION DEEP BRAIN STIMULATION  oct 2002    left thalamic DBS lead placed     STEREOTACTIC INSERTION DEEP BRAIN STIMULATION  oct 2002    right thalamic DBS lead placed     Past Medical History:   Diagnosis Date     1St degree AV block 10/20/2015    ECG shows mild sinus bradycardia with 1st degree heart block and min voltage criteria for LVH; borderline ECG     Anxiety      Dysarthria      Facial numbness      Hearing loss      Hypercholesterolemia      Hypertension      Low back pain      Lymphedema      Macular degeneration (senile) of retina      Memory loss      Mood change (H)      Osteoporosis, unspecified      PSEUDOBULBAR AFFECT      Recurrent falls      Skin cancer      Tremor      Urinary incontinence, urge      Urinary urgency      Wears glasses      Social History     Social History     Marital status:      Spouse name: N/A     Number of children: N/A     Years of education: N/A     Occupational History     Not on file.     Social History Main Topics     Smoking status: Never Smoker     Smokeless tobacco: Never Used     Alcohol use No     Drug use: No     Sexual activity: Not on file     Other Topics Concern     Not on file     Social History Narrative    lives in [place].  . Daughter Mary Ann Mijares.       Drug and lactation database from the [place] National Library of Medicine:  http://toxnet.nlm.nih.gov/cgi-bin/sis/htmlgen?LACT      B/P: Data Unavailable, T: Data Unavailable, P: Data Unavailable, R: Data Unavailable 0 lbs 0 oz  not currently breastfeeding., There is no height or weight on file to calculate BMI.  Medications and Vitals not listed above were documented in the cart and reviewed by me.     Current Outpatient Prescriptions   Medication Sig Dispense Refill     aspirin 81 MG tablet Take 1 tablet (81 mg) by mouth daily 90 tablet 0     atenolol (TENORMIN) 50 MG tablet Taking 0.5 tablet daily 45 tablet 3     carboxymethylcellulose (REFRESH) 1 % ophthalmic solution Place 1 drop into both eyes 2 times daily as needed 1 Bottle      Cholecalciferol (VITAMIN D) 400 UNITS capsule 1 tablet daily 90 capsule 11     escitalopram (LEXAPRO) 10 MG tablet TAKE 1 TABLET(10 MG) BY MOUTH DAILY 90 tablet 3     IBANdronate (BONIVA) 150 MG tablet Take 1 tablet (150 mg) by mouth every 30 days 1 tablet 11     Levothyroxine Sodium 75 MCG CAPS Take 75 mcg by mouth daily 30 capsule      naproxen sodium (ALEVE) 220 MG tablet Take 1 tablet by mouth 2 times daily (with meals).       omeprazole 20 MG tablet Take 1 tablet (20 mg) by mouth daily Take 30-60 minutes before a meal. 90 tablet 3     oxybutynin (DITROPAN XL) 5 MG 24 hr tablet Take 1 tablet (5 mg) by mouth daily 90 tablet 3     oxybutynin (DITROPAN) 5 MG tablet Take 1 tablet (5 mg) by mouth daily 360 tablet 0     pravastatin (PRAVACHOL) 20 MG tablet Take 1 tablet (20 mg) by mouth daily 90 tablet 3         Iban Neves MD

## 2018-10-25 NOTE — Clinical Note
10/25/2018       RE: Jennifer Ferrera  [address]  [place] [facility] [address]     Dear Colleague,    Thank you for referring your patient, Jennifer Ferrera, to the [facility]. Please see a copy of my visit note below.    No notes on file    Again, thank you for allowing me to participate in the care of your patient.      Sincerely,    Iban Neves MD

## 2018-10-25 NOTE — PATIENT INSTRUCTIONS
: 1931    ALLY: 2018    Essential Tremor  DBS 15 yrs ago    No short circuits and battery is fine.     Had ambulance come to house due to bleeding. Did not go to the emergency room   800 dollars    Battery is doing well.         Medications     6-9am 8am 8pm   ASpirin 81mg  1    Atenolol tenormin 50mg Not taking     Carboxymethylcellulose refresh 1% opthalmic solution      Cholecalciferol vitamin d 400 or 1000 units   1    Escitalopram lexapro 20mg   1    Furosemide lasix 40mg  1    Ibandronate boniva 150mg 21st of every month     Levothyroxine sodium 75mcg 1     Naproxen aleve 220mg  1 1   Omeprazole 20mg  1    Oxybutynin ditropan XL 5mg 24 hr tablet  1    Oxybutynin ditropan 5mg tablet  Not taking    Pravastatin pravachol 20mg   1                                History obtained from patient     No change in medication regimen  Battery is fine  She continues to live independently with someone who comes in every few weeks to clean and to help out with her feet care.     Return back in one year.

## 2018-10-25 NOTE — Clinical Note
Medtronic 8840 shows she has a LSTN, not LVIM. I cannot find any records that verify this. She had bilateral VIM with Harry in 2003 but got an infection in the left side. The lead was removed but I cannot find anywhere, where it says it was replaced in the STN.

## 2018-10-29 NOTE — NURSING NOTE
Interrogated patient's DBS Activa SC battery. Patient has Bilateral Deep Brain Stimulation systems. All impedances were checked at 3.0 Volts and were WNL. See neuromodulation sheets scanned. Patient informed.    LSTN Therapy impedance = 569 ohms, 4.150 mA    Contacts used = C+/0-/1-   (Double monopolar)  Upper limit = 2.8  Lower limit = 2.2  Current Setting = 2.4    Model 42140    Battery voltage 2.94    ** The left side needed removal due to infection and was replaced. Note that the Medtronic 8840 shows she has LSTN, not LVIM.  ----------------------------    RVIM Therapy impedance = 939 ohms, 3.233 mA    Contacts used = 1-/3+   (bipolar)  Upper limit = 3.0  Lower limit = 3.0  Current Setting = 3.0    Model 76086    Battery voltage 2.92

## 2018-11-26 ENCOUNTER — RECORDS - HEALTHEAST (OUTPATIENT)
Dept: LAB | Facility: CLINIC | Age: 83
End: 2018-11-26

## 2018-11-26 LAB
ANION GAP SERPL CALCULATED.3IONS-SCNC: 11 MMOL/L (ref 5–18)
BUN SERPL-MCNC: 14 MG/DL (ref 8–28)
CALCIUM SERPL-MCNC: 8.7 MG/DL (ref 8.5–10.5)
CHLORIDE BLD-SCNC: 104 MMOL/L (ref 98–107)
CHOLEST SERPL-MCNC: 140 MG/DL
CO2 SERPL-SCNC: 26 MMOL/L (ref 22–31)
CREAT SERPL-MCNC: 0.89 MG/DL (ref 0.6–1.1)
FASTING STATUS PATIENT QL REPORTED: ABNORMAL
GFR SERPL CREATININE-BSD FRML MDRD: 60 ML/MIN/1.73M2
GLUCOSE BLD-MCNC: 127 MG/DL (ref 70–125)
HDLC SERPL-MCNC: 46 MG/DL
LDLC SERPL CALC-MCNC: 79 MG/DL
POTASSIUM BLD-SCNC: 4.1 MMOL/L (ref 3.5–5)
SODIUM SERPL-SCNC: 141 MMOL/L (ref 136–145)
TRIGL SERPL-MCNC: 76 MG/DL
TSH SERPL DL<=0.005 MIU/L-ACNC: 2.6 UIU/ML (ref 0.3–5)

## 2019-01-17 ENCOUNTER — RECORDS - HEALTHEAST (OUTPATIENT)
Dept: LAB | Facility: CLINIC | Age: 84
End: 2019-01-17

## 2019-01-18 LAB — HBA1C MFR BLD: 5.4 % (ref 4.2–6.1)

## 2019-05-29 ENCOUNTER — RECORDS - HEALTHEAST (OUTPATIENT)
Dept: LAB | Facility: CLINIC | Age: 84
End: 2019-05-29

## 2019-05-29 LAB
ANION GAP SERPL CALCULATED.3IONS-SCNC: 9 MMOL/L (ref 5–18)
BUN SERPL-MCNC: 15 MG/DL (ref 8–28)
CALCIUM SERPL-MCNC: 9.4 MG/DL (ref 8.5–10.5)
CHLORIDE BLD-SCNC: 102 MMOL/L (ref 98–107)
CO2 SERPL-SCNC: 29 MMOL/L (ref 22–31)
CREAT SERPL-MCNC: 0.79 MG/DL (ref 0.6–1.1)
GFR SERPL CREATININE-BSD FRML MDRD: >60 ML/MIN/1.73M2
GLUCOSE BLD-MCNC: 109 MG/DL (ref 70–125)
POTASSIUM BLD-SCNC: 4.9 MMOL/L (ref 3.5–5)
SODIUM SERPL-SCNC: 140 MMOL/L (ref 136–145)

## 2019-06-14 ENCOUNTER — RECORDS - HEALTHEAST (OUTPATIENT)
Dept: LAB | Facility: CLINIC | Age: 84
End: 2019-06-14

## 2019-06-14 LAB
ANION GAP SERPL CALCULATED.3IONS-SCNC: 12 MMOL/L (ref 5–18)
BUN SERPL-MCNC: 14 MG/DL (ref 8–28)
CALCIUM SERPL-MCNC: 9.3 MG/DL (ref 8.5–10.5)
CHLORIDE BLD-SCNC: 103 MMOL/L (ref 98–107)
CO2 SERPL-SCNC: 27 MMOL/L (ref 22–31)
CREAT SERPL-MCNC: 0.92 MG/DL (ref 0.6–1.1)
GFR SERPL CREATININE-BSD FRML MDRD: 58 ML/MIN/1.73M2
GLUCOSE BLD-MCNC: 90 MG/DL (ref 70–125)
POTASSIUM BLD-SCNC: 3.9 MMOL/L (ref 3.5–5)
SODIUM SERPL-SCNC: 142 MMOL/L (ref 136–145)

## 2019-06-21 ENCOUNTER — RECORDS - HEALTHEAST (OUTPATIENT)
Dept: LAB | Facility: CLINIC | Age: 84
End: 2019-06-21

## 2019-06-24 LAB
ANION GAP SERPL CALCULATED.3IONS-SCNC: 8 MMOL/L (ref 5–18)
AST SERPL W P-5'-P-CCNC: 53 U/L (ref 0–40)
BASOPHILS # BLD AUTO: 0 THOU/UL (ref 0–0.2)
BASOPHILS NFR BLD AUTO: 1 % (ref 0–2)
BUN SERPL-MCNC: 10 MG/DL (ref 8–28)
C REACTIVE PROTEIN LHE: 0.7 MG/DL (ref 0–0.8)
CALCIUM SERPL-MCNC: 8.7 MG/DL (ref 8.5–10.5)
CHLORIDE BLD-SCNC: 107 MMOL/L (ref 98–107)
CO2 SERPL-SCNC: 27 MMOL/L (ref 22–31)
CREAT SERPL-MCNC: 0.7 MG/DL (ref 0.6–1.1)
EOSINOPHIL # BLD AUTO: 0.2 THOU/UL (ref 0–0.4)
EOSINOPHIL NFR BLD AUTO: 5 % (ref 0–6)
ERYTHROCYTE [DISTWIDTH] IN BLOOD BY AUTOMATED COUNT: 13.4 % (ref 11–14.5)
GFR SERPL CREATININE-BSD FRML MDRD: >60 ML/MIN/1.73M2
GLUCOSE BLD-MCNC: 83 MG/DL (ref 70–125)
HCT VFR BLD AUTO: 38.3 % (ref 35–47)
HGB BLD-MCNC: 12.3 G/DL (ref 12–16)
LYMPHOCYTES # BLD AUTO: 1.3 THOU/UL (ref 0.8–4.4)
LYMPHOCYTES NFR BLD AUTO: 27 % (ref 20–40)
MCH RBC QN AUTO: 33.2 PG (ref 27–34)
MCHC RBC AUTO-ENTMCNC: 32.1 G/DL (ref 32–36)
MCV RBC AUTO: 104 FL (ref 80–100)
MONOCYTES # BLD AUTO: 0.4 THOU/UL (ref 0–0.9)
MONOCYTES NFR BLD AUTO: 8 % (ref 2–10)
NEUTROPHILS # BLD AUTO: 2.8 THOU/UL (ref 2–7.7)
NEUTROPHILS NFR BLD AUTO: 59 % (ref 50–70)
PLATELET # BLD AUTO: 170 THOU/UL (ref 140–440)
PMV BLD AUTO: 11.7 FL (ref 8.5–12.5)
POTASSIUM BLD-SCNC: 3.8 MMOL/L (ref 3.5–5)
RBC # BLD AUTO: 3.7 MILL/UL (ref 3.8–5.4)
SODIUM SERPL-SCNC: 142 MMOL/L (ref 136–145)
WBC: 4.8 THOU/UL (ref 4–11)

## 2019-07-01 ENCOUNTER — RECORDS - HEALTHEAST (OUTPATIENT)
Dept: LAB | Facility: CLINIC | Age: 84
End: 2019-07-01

## 2019-07-01 LAB
AST SERPL W P-5'-P-CCNC: 21 U/L (ref 0–40)
BASOPHILS # BLD AUTO: 0.1 THOU/UL (ref 0–0.2)
BASOPHILS NFR BLD AUTO: 1 % (ref 0–2)
C REACTIVE PROTEIN LHE: 0.2 MG/DL (ref 0–0.8)
EOSINOPHIL # BLD AUTO: 0.2 THOU/UL (ref 0–0.4)
EOSINOPHIL NFR BLD AUTO: 5 % (ref 0–6)
ERYTHROCYTE [DISTWIDTH] IN BLOOD BY AUTOMATED COUNT: 13.4 % (ref 11–14.5)
HCT VFR BLD AUTO: 36.8 % (ref 35–47)
HGB BLD-MCNC: 11.9 G/DL (ref 12–16)
LYMPHOCYTES # BLD AUTO: 1.3 THOU/UL (ref 0.8–4.4)
LYMPHOCYTES NFR BLD AUTO: 25 % (ref 20–40)
MCH RBC QN AUTO: 33.4 PG (ref 27–34)
MCHC RBC AUTO-ENTMCNC: 32.3 G/DL (ref 32–36)
MCV RBC AUTO: 103 FL (ref 80–100)
MONOCYTES # BLD AUTO: 0.6 THOU/UL (ref 0–0.9)
MONOCYTES NFR BLD AUTO: 11 % (ref 2–10)
NEUTROPHILS # BLD AUTO: 3 THOU/UL (ref 2–7.7)
NEUTROPHILS NFR BLD AUTO: 58 % (ref 50–70)
PLATELET # BLD AUTO: 203 THOU/UL (ref 140–440)
PMV BLD AUTO: 11 FL (ref 8.5–12.5)
RBC # BLD AUTO: 3.56 MILL/UL (ref 3.8–5.4)
WBC: 5.2 THOU/UL (ref 4–11)

## 2019-07-05 ENCOUNTER — RECORDS - HEALTHEAST (OUTPATIENT)
Dept: LAB | Facility: CLINIC | Age: 84
End: 2019-07-05

## 2019-07-08 LAB
AST SERPL W P-5'-P-CCNC: 23 U/L (ref 0–40)
BASOPHILS # BLD AUTO: 0 THOU/UL (ref 0–0.2)
BASOPHILS NFR BLD AUTO: 1 % (ref 0–2)
C REACTIVE PROTEIN LHE: 0.2 MG/DL (ref 0–0.8)
CREAT SERPL-MCNC: 0.65 MG/DL (ref 0.6–1.1)
EOSINOPHIL # BLD AUTO: 0.2 THOU/UL (ref 0–0.4)
EOSINOPHIL NFR BLD AUTO: 4 % (ref 0–6)
ERYTHROCYTE [DISTWIDTH] IN BLOOD BY AUTOMATED COUNT: 13.2 % (ref 11–14.5)
GFR SERPL CREATININE-BSD FRML MDRD: >60 ML/MIN/1.73M2
HCT VFR BLD AUTO: 34.4 % (ref 35–47)
HGB BLD-MCNC: 11.6 G/DL (ref 12–16)
LYMPHOCYTES # BLD AUTO: 1.3 THOU/UL (ref 0.8–4.4)
LYMPHOCYTES NFR BLD AUTO: 31 % (ref 20–40)
MCH RBC QN AUTO: 33.4 PG (ref 27–34)
MCHC RBC AUTO-ENTMCNC: 33.7 G/DL (ref 32–36)
MCV RBC AUTO: 99 FL (ref 80–100)
MONOCYTES # BLD AUTO: 0.5 THOU/UL (ref 0–0.9)
MONOCYTES NFR BLD AUTO: 12 % (ref 2–10)
NEUTROPHILS # BLD AUTO: 2.2 THOU/UL (ref 2–7.7)
NEUTROPHILS NFR BLD AUTO: 52 % (ref 50–70)
PLATELET # BLD AUTO: 199 THOU/UL (ref 140–440)
PMV BLD AUTO: 10.5 FL (ref 8.5–12.5)
RBC # BLD AUTO: 3.47 MILL/UL (ref 3.8–5.4)
WBC: 4.3 THOU/UL (ref 4–11)

## 2019-07-09 ENCOUNTER — RECORDS - HEALTHEAST (OUTPATIENT)
Dept: LAB | Facility: CLINIC | Age: 84
End: 2019-07-09

## 2019-07-09 LAB
C DIFF TOX B STL QL: NEGATIVE
RIBOTYPE 027/NAP1/BI: NORMAL

## 2019-07-12 ENCOUNTER — RECORDS - HEALTHEAST (OUTPATIENT)
Dept: LAB | Facility: CLINIC | Age: 84
End: 2019-07-12

## 2019-07-15 LAB
AST SERPL W P-5'-P-CCNC: 31 U/L (ref 0–40)
BASOPHILS # BLD AUTO: 0 THOU/UL (ref 0–0.2)
BASOPHILS NFR BLD AUTO: 1 % (ref 0–2)
C REACTIVE PROTEIN LHE: 0.2 MG/DL (ref 0–0.8)
EOSINOPHIL # BLD AUTO: 0.2 THOU/UL (ref 0–0.4)
EOSINOPHIL NFR BLD AUTO: 4 % (ref 0–6)
ERYTHROCYTE [DISTWIDTH] IN BLOOD BY AUTOMATED COUNT: 13.5 % (ref 11–14.5)
HCT VFR BLD AUTO: 39.7 % (ref 35–47)
HGB BLD-MCNC: 12.6 G/DL (ref 12–16)
LYMPHOCYTES # BLD AUTO: 1 THOU/UL (ref 0.8–4.4)
LYMPHOCYTES NFR BLD AUTO: 23 % (ref 20–40)
MCH RBC QN AUTO: 33.1 PG (ref 27–34)
MCHC RBC AUTO-ENTMCNC: 31.7 G/DL (ref 32–36)
MCV RBC AUTO: 104 FL (ref 80–100)
MONOCYTES # BLD AUTO: 0.6 THOU/UL (ref 0–0.9)
MONOCYTES NFR BLD AUTO: 15 % (ref 2–10)
NEUTROPHILS # BLD AUTO: 2.4 THOU/UL (ref 2–7.7)
NEUTROPHILS NFR BLD AUTO: 58 % (ref 50–70)
PLATELET # BLD AUTO: 225 THOU/UL (ref 140–440)
PMV BLD AUTO: 10.8 FL (ref 8.5–12.5)
RBC # BLD AUTO: 3.81 MILL/UL (ref 3.8–5.4)
WBC: 4.2 THOU/UL (ref 4–11)

## 2019-07-16 LAB
CREAT SERPL-MCNC: 0.69 MG/DL (ref 0.6–1.1)
GFR SERPL CREATININE-BSD FRML MDRD: >60 ML/MIN/1.73M2

## 2019-10-19 RX ORDER — OXYCODONE HYDROCHLORIDE 5 MG/1
5 TABLET ORAL
COMMUNITY
Start: 2019-09-10 | End: 2019-10-22

## 2019-10-19 RX ORDER — NAPROXEN SODIUM 220 MG
220 TABLET ORAL 2 TIMES DAILY
COMMUNITY
End: 2019-10-22

## 2019-10-19 NOTE — PROGRESS NOTES
"Diagnosis/Summary/Recommendations:    PATIENT: Jennifer Ferrera  87 year old female     : 1931    ALLY: 2019    Please refer to nursing note on DBS interrogation from today.     Batteries replaced     2.88 on the left side  2.84 on the right side.      SHE IS BEING TREATED FOR OSTEOMYELITIS OF HER JAW AND IS A 60 DAY COURSE OF PENICILLIN  SHE NEEDS A BONE GRAFT BUT IT MAY NOT BE POSSIBLE GIVEN AGE.   SO SHE HAS A \"FRAGILE JAW\"    Living by herself in [facility] and has a cleaning lady.   Meals - she is making her own meals - tv dinners and microwave meals  She has had to go to [employer]    TABLE BELOW IS NOT ACCURATE      Medications     6-9am 8am 8pm   ASpirin 81mg   1     Atenolol tenormin 50mg Not taking       Carboxymethylcellulose refresh 1% opthalmic solution         Cholecalciferol vitamin d 400 or 1000 units    1     Escitalopram lexapro 20mg    1     Furosemide lasix 40mg   1     Ibandronate boniva 150mg 21st of every month       Levothyroxine sodium 75mcg 1       Naproxen aleve 220mg   1 1   Omeprazole 20mg   1     Oxybutynin ditropan XL 5mg 24 hr tablet   1     Oxybutynin ditropan 5mg tablet   Not taking     Pravastatin pravachol 20mg    1     Probiotic                                           History obtained from patient      Coding statement:   Duration of  Services: patient care and care coordination was 25 minutes  Greater than 50% of this visit was spent in counseling and coordination of care.     Iban Neves MD     ______________________________________    Last visit date and details:      ALLY: 2018     Essential Tremor  DBS 15 yrs ago     No short circuits and battery is fine.      Had ambulance come to house due to bleeding. Did not go to the emergency room   800 dollars     Battery is doing well.            Medications     6-9am 8am 8pm   ASpirin 81mg   1     Atenolol tenormin 50mg Not taking       Carboxymethylcellulose refresh 1% opthalmic solution       "   Cholecalciferol vitamin d 400 or 1000 units    1     Escitalopram lexapro 20mg    1     Furosemide lasix 40mg   1     Ibandronate boniva 150mg 21st of every month       Levothyroxine sodium 75mcg 1       Naproxen aleve 220mg   1 1   Omeprazole 20mg   1     Oxybutynin ditropan XL 5mg 24 hr tablet   1     Oxybutynin ditropan 5mg tablet   Not taking     Pravastatin pravachol 20mg    1                                                   History obtained from patient      No change in medication regimen  Battery is fine  She continues to live independently with someone who comes in every few weeks to clean and to help out with her feet care.      Return back in one year.     ______________________________________      Patient was asked about 14 Review of systems including changes in vision (dry eyes, double vision), hearing, heart, lungs, musculoskeletal, depression, anxiety, snoring, RBD, insomnia, urinary frequency, urinary urgency, constipation, swallowing problems, hematological, ID, allergies, skin problems: seborrhea, endocrinological: thyroid, diabetes, cholesterol; balance, weight changes, and other neurological problems and these were not significant at this time except for   Patient Active Problem List   Diagnosis     Tremor     S/P brain surgery     Lymphedema     Anxiety     PSEUDOBULBAR AFFECT     Wears glasses     Hearing loss     Urinary incontinence, urge     Urinary urgency     Memory loss     Skin cancer     Hypercholesterolemia     Nocturia     Mood change     Macular degeneration (senile) of retina     Recurrent falls     Osteoporosis     Dysarthria     Facial numbness     Tooth infection     Basal cell carcinoma of deltoid region, right     1st degree AV block     Low back pain          Allergies   Allergen Reactions     Vioxx      Pt. Is unaware of any allergy to this med.     Past Surgical History:   Procedure Laterality Date     CHOLECYSTECTOMY       Excision of basal cell carcinoma from [facility]  Sep  2015    right shoulder     EXTRACAPSULAR CATARACT EXTRATION WITH INTRAOCULAR LENS IMPLANT       EXTRACAPSULAR CATARACT EXTRATION WITH INTRAOCULAR LENS IMPLANT       HYSTERECTOMY       REPLACE DEEP BRAIN STIMULATION GENERATOR / BATTERY BILATERAL Bilateral 11/8/2016    Procedure: REPLACE DEEP BRAIN STIMULATION GENERATOR / BATTERY BILATERAL;  Surgeon: Bentley Martinez MD;  Location: [facility] OR     REPLACE PULSE GENERATOR SUBCUTANEOUS BILATERAL  29 Jul 2008    Replaced both soletra     REPLACE PULSE GENERATOR SUBCUTANEOUS BILATERAL  7/2/2013    Procedure: REPLACE PULSE GENERATOR SUBCUTANEOUS BILATERAL;  Bilateral Deep Brain Stimulator Battery Exchange ;  Surgeon: Bentley Martinez MD;  Location: [facility] OR     Right DBS Lead removal  jan 2003    infected right lead removed; was in a nursing home on antiobiotics     STEREOTACTIC INSERTION DEEP BRAIN STIMULATION  12 May 2003    placed lead in may after removal of right thalamic  DBS lead due to infection feb 17 2003     STEREOTACTIC INSERTION DEEP BRAIN STIMULATION  9 jun 2003    revision of right DBS electrode     STEREOTACTIC INSERTION DEEP BRAIN STIMULATION  oct 2002    left thalamic DBS lead placed     STEREOTACTIC INSERTION DEEP BRAIN STIMULATION  oct 2002    right thalamic DBS lead placed     Past Medical History:   Diagnosis Date     1st degree AV block 10/20/2015    ECG shows mild sinus bradycardia with 1st degree heart block and min voltage criteria for LVH; borderline ECG     Anxiety      Dysarthria      Facial numbness      Hearing loss      Hypercholesterolemia      Hypertension      Low back pain      Lymphedema      Macular degeneration (senile) of retina      Memory loss      Mood change      Osteoporosis, unspecified      PSEUDOBULBAR AFFECT      Recurrent falls      Skin cancer      Tremor      Urinary incontinence, urge      Urinary urgency      Wears glasses      Social History     Socioeconomic History     Marital status:      Spouse name:  Not on file     Number of children: Not on file     Years of education: Not on file     Highest education level: Not on file   Occupational History     Not on file   Social Needs     Financial resource strain: Not on file     Food insecurity:     Worry: Not on file     Inability: Not on file     Transportation needs:     Medical: Not on file     Non-medical: Not on file   Tobacco Use     Smoking status: Never Smoker     Smokeless tobacco: Never Used   Substance and Sexual Activity     Alcohol use: No     Drug use: No     Sexual activity: Not on file   Lifestyle     Physical activity:     Days per week: Not on file     Minutes per session: Not on file     Stress: Not on file   Relationships     Social connections:     Talks on phone: Not on file     Gets together: Not on file     Attends Muslim service: Not on file     Active member of club or organization: Not on file     Attends meetings of clubs or organizations: Not on file     Relationship status: Not on file     Intimate partner violence:     Fear of current or ex partner: Not on file     Emotionally abused: Not on file     Physically abused: Not on file     Forced sexual activity: Not on file   Other Topics Concern     Parent/sibling w/ CABG, MI or angioplasty before 65F 55M? Not Asked   Social History Narrative    lives in [place]. . Daughter Mary Ann Mijares.       Drug and lactation database from the [place] National Library of Medicine:  http://toxnet.nlm.nih.gov/cgi-bin/sis/htmlgen?LACT      B/P: Data Unavailable, T: Data Unavailable, P: Data Unavailable, R: Data Unavailable 0 lbs 0 oz  not currently breastfeeding., There is no height or weight on file to calculate BMI.  Medications and Vitals not listed above were documented in the cart and reviewed by me.     Current Outpatient Medications   Medication Sig Dispense Refill     oxyCODONE (ROXICODONE) 5 MG tablet Take 5 mg by mouth       aspirin 81 MG tablet Take 1 tablet (81 mg) by  mouth daily 90 tablet 0     carboxymethylcellulose (REFRESH) 1 % ophthalmic solution Place 1 drop into both eyes 2 times daily as needed 1 Bottle      cholecalciferol (VITAMIN  -D) 1000 units capsule 1000 units by mouth daily (vitamin D/cholecalciferol) 90 capsule 3     escitalopram (LEXAPRO) 20 MG tablet 20mg tab by mouth daily 90 tablet 3     furosemide (LASIX) 40 MG tablet 40mg tab by mouth daily  1     IBANdronate (BONIVA) 150 MG tablet Take 1 tablet (150 mg) by mouth every 30 days 1 tablet 11     Levothyroxine Sodium 75 MCG CAPS Take 75 mcg by mouth daily 30 capsule      naproxen sodium (ALEVE) 220 MG tablet Take 1 tablet by mouth 2 times daily (with meals).       naproxen sodium (ANAPROX) 220 MG tablet Take 220 mg by mouth 2 times daily       omeprazole 20 MG tablet Take 1 tablet (20 mg) by mouth daily Take 30-60 minutes before a meal. 90 tablet 3     oxybutynin (DITROPAN XL) 5 MG 24 hr tablet Take 1 tablet (5 mg) by mouth daily 90 tablet 3     pravastatin (PRAVACHOL) 20 MG tablet Take 1 tablet (20 mg) by mouth daily 90 tablet 3     Probiotic Product (PROBIOTIC-10 PO) Take by mouth daily           Iban Neves MD

## 2019-10-22 ENCOUNTER — ALLIED HEALTH/NURSE VISIT (OUTPATIENT)
Dept: NEUROLOGY | Facility: CLINIC | Age: 84
End: 2019-10-22

## 2019-10-22 ENCOUNTER — OFFICE VISIT (OUTPATIENT)
Dept: NEUROLOGY | Facility: CLINIC | Age: 84
End: 2019-10-22
Payer: COMMERCIAL

## 2019-10-22 VITALS — DIASTOLIC BLOOD PRESSURE: 62 MMHG | SYSTOLIC BLOOD PRESSURE: 186 MMHG | HEART RATE: 62 BPM | OXYGEN SATURATION: 98 %

## 2019-10-22 DIAGNOSIS — G25.0 ESSENTIAL TREMOR: Primary | ICD-10-CM

## 2019-10-22 PROBLEM — Z98.890 POST-OPERATIVE STATE: Status: ACTIVE | Noted: 2019-09-10

## 2019-10-22 PROBLEM — E78.5 HYPERLIPIDEMIA: Status: ACTIVE | Noted: 2019-06-18

## 2019-10-22 PROBLEM — K21.9 GERD (GASTROESOPHAGEAL REFLUX DISEASE): Status: ACTIVE | Noted: 2019-06-18

## 2019-10-22 PROBLEM — R00.1 BRADYCARDIA: Status: ACTIVE | Noted: 2019-06-19

## 2019-10-22 PROBLEM — M86.9 OSTEOMYELITIS (H): Status: ACTIVE | Noted: 2019-06-18

## 2019-10-22 PROBLEM — R00.0 SINUS TACHYCARDIA: Status: ACTIVE | Noted: 2019-06-19

## 2019-10-22 PROBLEM — F32.A DEPRESSION: Status: ACTIVE | Noted: 2019-06-18

## 2019-10-22 RX ORDER — CHLORHEXIDINE GLUCONATE ORAL RINSE 1.2 MG/ML
15 SOLUTION DENTAL 2 TIMES DAILY PRN
COMMUNITY
Start: 2019-06-19 | End: 2019-10-22

## 2019-10-22 RX ORDER — KETOCONAZOLE 20 MG/ML
SHAMPOO TOPICAL DAILY PRN
Refills: 0 | COMMUNITY
Start: 2019-03-08 | End: 2020-12-23

## 2019-10-22 RX ORDER — ESCITALOPRAM OXALATE 10 MG/1
TABLET ORAL
Refills: 0 | COMMUNITY
Start: 2019-10-11 | End: 2019-10-22

## 2019-10-22 RX ORDER — PENICILLIN V POTASSIUM 500 MG/1
500 TABLET, FILM COATED ORAL 4 TIMES DAILY
COMMUNITY
Start: 2019-10-14 | End: 2019-12-13

## 2019-10-22 RX ORDER — KETOCONAZOLE 20 MG/ML
1 SHAMPOO TOPICAL
COMMUNITY
End: 2019-10-22

## 2019-10-22 RX ORDER — LISINOPRIL 5 MG/1
TABLET ORAL
Refills: 0 | COMMUNITY
Start: 2019-07-18 | End: 2019-10-22

## 2019-10-22 ASSESSMENT — PAIN SCALES - GENERAL: PAINLEVEL: NO PAIN (0)

## 2019-10-22 NOTE — LETTER
"10/22/2019       RE: Jennifer Ferrera  [address] [id number]     Dear Colleague,    Thank you for referring your patient, Jennifer Ferrera, to the [facility] at [facility]. Please see a copy of my visit note below.    Diagnosis/Summary/Recommendations:    PATIENT: Jennifer Ferrera  87 year old female     : 1931    ALLY: 2019    Please refer to nursing note on DBS interrogation from today.     Batteries replaced     2.88 on the left side  2.84 on the right side.      SHE IS BEING TREATED FOR OSTEOMYELITIS OF HER JAW AND IS A 60 DAY COURSE OF PENICILLIN  SHE NEEDS A BONE GRAFT BUT IT MAY NOT BE POSSIBLE GIVEN AGE.   SO SHE HAS A \"FRAGILE JAW\"    Living by herself in [facility] and has a cleaning lady.   Meals - she is making her own meals - tv dinners and microwave meals  She has had to go to [employer]    TABLE BELOW IS NOT ACCURATE      Medications     6-9am 8am 8pm   ASpirin 81mg   1     Atenolol tenormin 50mg Not taking       Carboxymethylcellulose refresh 1% opthalmic solution         Cholecalciferol vitamin d 400 or 1000 units    1     Escitalopram lexapro 20mg    1     Furosemide lasix 40mg   1     Ibandronate boniva 150mg 21st of every month       Levothyroxine sodium 75mcg 1       Naproxen aleve 220mg   1 1   Omeprazole 20mg   1     Oxybutynin ditropan XL 5mg 24 hr tablet   1     Oxybutynin ditropan 5mg tablet   Not taking     Pravastatin pravachol 20mg    1     Probiotic                                           History obtained from patient      Coding statement:   Duration of  Services: patient care and care coordination was 25 minutes  Greater than 50% of this visit was spent in counseling and coordination of care.     Iban Neves MD     ______________________________________    Last visit date and details:      ALLY: 2018     Essential Tremor  DBS 15 yrs ago     No short circuits and battery is fine.      Had " ambulance come to house due to bleeding. Did not go to the emergency room   800 dollars     Battery is doing well.            Medications     6-9am 8am 8pm   ASpirin 81mg   1     Atenolol tenormin 50mg Not taking       Carboxymethylcellulose refresh 1% opthalmic solution         Cholecalciferol vitamin d 400 or 1000 units    1     Escitalopram lexapro 20mg    1     Furosemide lasix 40mg   1     Ibandronate boniva 150mg 21st of every month       Levothyroxine sodium 75mcg 1       Naproxen aleve 220mg   1 1   Omeprazole 20mg   1     Oxybutynin ditropan XL 5mg 24 hr tablet   1     Oxybutynin ditropan 5mg tablet   Not taking     Pravastatin pravachol 20mg    1                                                   History obtained from patient      No change in medication regimen  Battery is fine  She continues to live independently with someone who comes in every few weeks to clean and to help out with her feet care.      Return back in one year.     ______________________________________      Patient was asked about 14 Review of systems including changes in vision (dry eyes, double vision), hearing, heart, lungs, musculoskeletal, depression, anxiety, snoring, RBD, insomnia, urinary frequency, urinary urgency, constipation, swallowing problems, hematological, ID, allergies, skin problems: seborrhea, endocrinological: thyroid, diabetes, cholesterol; balance, weight changes, and other neurological problems and these were not significant at this time except for   Patient Active Problem List   Diagnosis     Tremor     S/P brain surgery     Lymphedema     Anxiety     PSEUDOBULBAR AFFECT     Wears glasses     Hearing loss     Urinary incontinence, urge     Urinary urgency     Memory loss     Skin cancer     Hypercholesterolemia     Nocturia     Mood change     Macular degeneration (senile) of retina     Recurrent falls     Osteoporosis     Dysarthria     Facial numbness     Tooth infection     Basal cell carcinoma of deltoid  region, right     1st degree AV block     Low back pain          Allergies   Allergen Reactions     Vioxx      Pt. Is unaware of any allergy to this med.     Past Surgical History:   Procedure Laterality Date     CHOLECYSTECTOMY       Excision of basal cell carcinoma from [facility]  Sep 2015    right shoulder     EXTRACAPSULAR CATARACT EXTRATION WITH INTRAOCULAR LENS IMPLANT       EXTRACAPSULAR CATARACT EXTRATION WITH INTRAOCULAR LENS IMPLANT       HYSTERECTOMY       REPLACE DEEP BRAIN STIMULATION GENERATOR / BATTERY BILATERAL Bilateral 11/8/2016    Procedure: REPLACE DEEP BRAIN STIMULATION GENERATOR / BATTERY BILATERAL;  Surgeon: Bentley Martinez MD;  Location: [facility] OR     REPLACE PULSE GENERATOR SUBCUTANEOUS BILATERAL  29 Jul 2008    Replaced both soletra     REPLACE PULSE GENERATOR SUBCUTANEOUS BILATERAL  7/2/2013    Procedure: REPLACE PULSE GENERATOR SUBCUTANEOUS BILATERAL;  Bilateral Deep Brain Stimulator Battery Exchange ;  Surgeon: Bentley Martinez MD;  Location: [facility] OR     Right DBS Lead removal  jan 2003    infected right lead removed; was in a nursing home on antiobiotics     STEREOTACTIC INSERTION DEEP BRAIN STIMULATION  12 May 2003    placed lead in may after removal of right thalamic  DBS lead due to infection feb 17 2003     STEREOTACTIC INSERTION DEEP BRAIN STIMULATION  9 jun 2003    revision of right DBS electrode     STEREOTACTIC INSERTION DEEP BRAIN STIMULATION  oct 2002    left thalamic DBS lead placed     STEREOTACTIC INSERTION DEEP BRAIN STIMULATION  oct 2002    right thalamic DBS lead placed     Past Medical History:   Diagnosis Date     1st degree AV block 10/20/2015    ECG shows mild sinus bradycardia with 1st degree heart block and min voltage criteria for LVH; borderline ECG     Anxiety      Dysarthria      Facial numbness      Hearing loss      Hypercholesterolemia      Hypertension      Low back pain      Lymphedema      Macular degeneration (senile) of retina      Memory loss       Mood change      Osteoporosis, unspecified      PSEUDOBULBAR AFFECT      Recurrent falls      Skin cancer      Tremor      Urinary incontinence, urge      Urinary urgency      Wears glasses      Social History     Socioeconomic History     Marital status:      Spouse name: Not on file     Number of children: Not on file     Years of education: Not on file     Highest education level: Not on file   Occupational History     Not on file   Social Needs     Financial resource strain: Not on file     Food insecurity:     Worry: Not on file     Inability: Not on file     Transportation needs:     Medical: Not on file     Non-medical: Not on file   Tobacco Use     Smoking status: Never Smoker     Smokeless tobacco: Never Used   Substance and Sexual Activity     Alcohol use: No     Drug use: No     Sexual activity: Not on file   Lifestyle     Physical activity:     Days per week: Not on file     Minutes per session: Not on file     Stress: Not on file   Relationships     Social connections:     Talks on phone: Not on file     Gets together: Not on file     Attends Anabaptist service: Not on file     Active member of club or organization: Not on file     Attends meetings of clubs or organizations: Not on file     Relationship status: Not on file     Intimate partner violence:     Fear of current or ex partner: Not on file     Emotionally abused: Not on file     Physically abused: Not on file     Forced sexual activity: Not on file   Other Topics Concern     Parent/sibling w/ CABG, MI or angioplasty before 65F 55M? Not Asked   Social History Narrative    lives in [place]. . Daughter Mary Ann Mijares.       Drug and lactation database from the [place] National Library of Medicine:  http://toxnet.nlm.nih.gov/cgi-bin/sis/htmlgen?LACT      B/P: Data Unavailable, T: Data Unavailable, P: Data Unavailable, R: Data Unavailable 0 lbs 0 oz  not currently breastfeeding., There is no height or weight on file to  calculate BMI.  Medications and Vitals not listed above were documented in the cart and reviewed by me.     Current Outpatient Medications   Medication Sig Dispense Refill     oxyCODONE (ROXICODONE) 5 MG tablet Take 5 mg by mouth       aspirin 81 MG tablet Take 1 tablet (81 mg) by mouth daily 90 tablet 0     carboxymethylcellulose (REFRESH) 1 % ophthalmic solution Place 1 drop into both eyes 2 times daily as needed 1 Bottle      cholecalciferol (VITAMIN  -D) 1000 units capsule 1000 units by mouth daily (vitamin D/cholecalciferol) 90 capsule 3     escitalopram (LEXAPRO) 20 MG tablet 20mg tab by mouth daily 90 tablet 3     furosemide (LASIX) 40 MG tablet 40mg tab by mouth daily  1     IBANdronate (BONIVA) 150 MG tablet Take 1 tablet (150 mg) by mouth every 30 days 1 tablet 11     Levothyroxine Sodium 75 MCG CAPS Take 75 mcg by mouth daily 30 capsule      naproxen sodium (ALEVE) 220 MG tablet Take 1 tablet by mouth 2 times daily (with meals).       naproxen sodium (ANAPROX) 220 MG tablet Take 220 mg by mouth 2 times daily       omeprazole 20 MG tablet Take 1 tablet (20 mg) by mouth daily Take 30-60 minutes before a meal. 90 tablet 3     oxybutynin (DITROPAN XL) 5 MG 24 hr tablet Take 1 tablet (5 mg) by mouth daily 90 tablet 3     pravastatin (PRAVACHOL) 20 MG tablet Take 1 tablet (20 mg) by mouth daily 90 tablet 3     Probiotic Product (PROBIOTIC-10 PO) Take by mouth daily           Iban Neves MD    Again, thank you for allowing me to participate in the care of your patient.      Sincerely,    Iban Neves MD

## 2019-10-22 NOTE — NURSING NOTE
Chief Complaint   Patient presents with     RECHECK     [facility] RETURN - 1 YEAR FOLLOW UP       Torey Monroe, EMT

## 2019-10-22 NOTE — PROGRESS NOTES
Jennifer comes into clinic today at the request of Dr. Neves, ordering provider for follow-up visit to further evaluate her Deep Brain Stimulation systems. This service provided today was under the supervising provider of the day Dr. Neves, who was available if needed.     Reason for visit: DEEP BRAIN STIMULATION Interrogation   Time spent on visit: 10 minutes    Mary Ann Chapman RN    Movement Disorder Care Coordinator  [facility]  ---------------------------------------  Interrogated patient's DBS Medtronic Activa SC batteries. Patient has BVIM. All impedances were checked at 0.7 Volts and were WNL. See neuromodulation sheets scanned. Patient informed.    LVIM Therapy impedance = 569 ohms, 4.2 mA    Contacts used = C+/0-/1-   (Double monopolar)  Upper limit = 2.8  Lower limit = 2.2  Current Setting = 2.4    Model 57090    Battery voltage 2.88    RVIM Therapy impedance = 943 ohms, 3.2 mA    Contacts used = 1-/3+   (bipolar)  Current Setting = 3.0    Model 81230    Battery voltage 2.84

## 2019-11-02 ENCOUNTER — HEALTH MAINTENANCE LETTER (OUTPATIENT)
Age: 84
End: 2019-11-02

## 2019-12-04 ENCOUNTER — RECORDS - HEALTHEAST (OUTPATIENT)
Dept: LAB | Facility: CLINIC | Age: 84
End: 2019-12-04

## 2019-12-04 LAB
ANION GAP SERPL CALCULATED.3IONS-SCNC: 8 MMOL/L (ref 5–18)
BUN SERPL-MCNC: 16 MG/DL (ref 8–28)
CALCIUM SERPL-MCNC: 8.7 MG/DL (ref 8.5–10.5)
CHLORIDE BLD-SCNC: 102 MMOL/L (ref 98–107)
CHOLEST SERPL-MCNC: 163 MG/DL
CO2 SERPL-SCNC: 29 MMOL/L (ref 22–31)
CREAT SERPL-MCNC: 0.86 MG/DL (ref 0.6–1.1)
FASTING STATUS PATIENT QL REPORTED: ABNORMAL
GFR SERPL CREATININE-BSD FRML MDRD: >60 ML/MIN/1.73M2
GLUCOSE BLD-MCNC: 98 MG/DL (ref 70–125)
HDLC SERPL-MCNC: 39 MG/DL
LDLC SERPL CALC-MCNC: 107 MG/DL
POTASSIUM BLD-SCNC: 4.8 MMOL/L (ref 3.5–5)
SODIUM SERPL-SCNC: 139 MMOL/L (ref 136–145)
TRIGL SERPL-MCNC: 87 MG/DL
TSH SERPL DL<=0.005 MIU/L-ACNC: 2.47 UIU/ML (ref 0.3–5)

## 2020-01-24 DIAGNOSIS — G25.0 ESSENTIAL TREMOR: ICD-10-CM

## 2020-01-27 NOTE — TELEPHONE ENCOUNTER
Rx Authorization:    Requested Medication/ Dose 10/25/18    Date last refill ordered:10/25/18    Quantity ordered: 90    # refill3    Date of last clinic visit with ordering provider: 10/22/19    Date of next clinic visit with ordering provider: 10/22/20    All pertinent protocol data (lab date/result):     Include pertinent information from patients message:

## 2020-02-10 ENCOUNTER — HEALTH MAINTENANCE LETTER (OUTPATIENT)
Age: 85
End: 2020-02-10

## 2020-05-13 ENCOUNTER — RECORDS - HEALTHEAST (OUTPATIENT)
Dept: LAB | Facility: CLINIC | Age: 85
End: 2020-05-13

## 2020-05-13 LAB
CHOLEST SERPL-MCNC: 135 MG/DL
FASTING STATUS PATIENT QL REPORTED: ABNORMAL
HDLC SERPL-MCNC: 42 MG/DL
LDLC SERPL CALC-MCNC: 77 MG/DL
TRIGL SERPL-MCNC: 79 MG/DL

## 2020-05-16 LAB
BACTERIA SPEC CULT: ABNORMAL
BACTERIA SPEC CULT: ABNORMAL

## 2020-11-14 ENCOUNTER — HEALTH MAINTENANCE LETTER (OUTPATIENT)
Age: 85
End: 2020-11-14

## 2020-11-25 NOTE — PROGRESS NOTES
VIDEO VISIT used [employer]    Date of Visit: December 11, 2020  Name: Jennifer Ferrera  Date of Birth 11/23/1931  [address]   [facility] [address] [phone] (H)   [phone] (M)     Mary Ann Mijares  [phone]      Assessment:  (R25.1) Tremor  (primary encounter diagnosis)  Tremors are stable  Last battery replacement was 2016  Had been seen last fall 2019      Gait/Balance/Falls - no falls; using a walker    Exercise/Therapy - does not want therapy at this time.     Cognitive/Driving  - still driving very limited.     Mood has been good   Escitalopram lexapro 10mg     Hallucinations/delusions  - denies    Sleep for 3 hours and then get up an gets up and goes  Back to bed.     GI/Constipation/GERD - has some issues and is on a probiotic  Not taking omeprazole    Vision   Taking ocuvite  Using refresh solution for her arms    Aleve 220mg twice daily for arthritis     She is dealing with an infection of her leg.   She is on a topical antibiotic gentamicin and off oral medication.    Cardiovascular  Atenolol tenormin - not taking  Furosemide lasix 40mg daily    Heme  Aspirin 81mg daily     ENDO  Cholecalciferol vitamin D3 25 mcg 1000 units  Ibandronate boniva 150mg - not clear if taking  Levothyroxine sodium 50mcg daily   Pravastatin pravachol 20mg at night    Eyes  Carboxymethylcellulose refresh 1% ophthalmic solution  MVI ocuvite    Bladder  Oxybutynin ditropan XL 5mg 24 hr tablet - not taking  Oxybutynin ditropan 5mg tablet  - not taking       Medications     6-9am 8am 8pm   ASpirin 81mg   1     Atenolol tenormin 50mg Not taking       Carboxymethylcellulose refresh 1% opthalmic solution         Cholecalciferol vitamin d 1000 units 25 mcg   1     Escitalopram lexapro 10mg 1     Furosemide lasix 40mg   1     Ibandronate boniva 150mg Not clear if taking       Ketaconazole nizoral 2% external shampoo      Levothyroxine sodium 50mcg 1       MVI ocuvite      Naproxen aleve 220mg   1 1  "  Omeprazole 20mg  not taking      Oxybutynin ditropan XL 5mg 24 hr tablet   ?taking     Oxybutynin ditropan 5mg tablet   Not taking     Pravastatin pravachol 20mg    1     Probiotic  1                                        Plan:    Will need to come in and have her battery reviewed in the next week  2016 had batteries replaced.  Has not been seen since October 2019    Medical Decision Making:  #  2 Chronic medical conditions  0 review and/or interpretation of unique test or documentation from a provider outside of neurology  1 Independent historian provided additional details  0 Prescription drug management and review of potential side effects and/or monitoring for side effects  No Health impacted by social determinants of health    I have reviewed the note as documented above.  This accurately captures the substance of my conversation with the patient.  Patient contact time  25  minutes. Starting and end time of visit  1025am - 1050am    Over 50% of this visit was spent in patient care and care coordination at this visit (Starting 1/1/2021 time documented will include total time spent on the day of visit related to this individual's care)    Iban Neves MD      ------------------------------------------------------------------------------------------------------------------------------------------------------------------------    Video-Visit Details    The patient has been notified of following:     \"After a review of the patient s situation, this visit was changed from an in-person visit to a video visit to reduce the risk of COVID-19 exposure.   The patient is being evaluated via a billable video visit.\"    \"This video visit will be conducted via a call between you and your physician/provider. We have found that certain health care needs can be provided without the need for an in-person physical exam.  This service lets us provide the care you need with a video conversation.  If a prescription is necessary we " "can send it directly to your pharmacy.  If lab work is needed we can place an order for that and you can then stop by our lab to have the test done at a later time.    If during the course of the call the physician/provider feels a video visit is not appropriate, you will not be charged for this service.\"     Patient has given verbal consent for Video visit? Yes    Patient would like the video invitation sent by:     Type of service:  Video Visit    Video Start Time:     Video End Time (time video stopped):     Duration:  minutes - see above    Originating Location (pt. Location):     Distant Location (provider location):  [facility] [place]     Mode of Communication:  Video Conference via [employer] (and if not possible then doximity)      Iban Neves MD      --------------------------------------------------------------------------------------------------------------    Jennifer Ferrera is a 89 year old female who is being evaluated via a billable video visit.      Charts reviewed  Consult from  Images reviewed        I have reviewed and updated the patient's Past Medical History, Social History, Family History and Medication List.    ALLERGIES  Adhesive tape, Latex, and Vioxx    Lasts visit details if there was a last visit:       14 Review of systems  are negative except for   Patient Active Problem List   Diagnosis     Tremor     S/P brain surgery     Lymphedema     Anxiety     PSEUDOBULBAR AFFECT     Wears glasses     Hearing loss     Urinary incontinence, urge     Urinary urgency     Memory loss     Skin cancer     Hypercholesterolemia     Nocturia     Mood change     Macular degeneration (senile) of retina     Recurrent falls     Osteoporosis     Dysarthria     Facial numbness     Tooth infection     Basal cell carcinoma of deltoid region, right     1st degree AV block     Low back pain     Bradycardia     Depression     Essential tremor     GERD (gastroesophageal reflux disease)     " Osteomyelitis (H)     Post-operative state     Sinus tachycardia     Hyperlipidemia        Allergies   Allergen Reactions     Adhesive Tape      Latex      Vioxx      Pt. Is unaware of any allergy to this med.     Past Surgical History:   Procedure Laterality Date     CHOLECYSTECTOMY       Excision of basal cell carcinoma from RUE  Sep 2015    right shoulder     EXTRACAPSULAR CATARACT EXTRATION WITH INTRAOCULAR LENS IMPLANT       EXTRACAPSULAR CATARACT EXTRATION WITH INTRAOCULAR LENS IMPLANT       HYSTERECTOMY       REPLACE DEEP BRAIN STIMULATION GENERATOR / BATTERY BILATERAL Bilateral 11/8/2016    Procedure: REPLACE DEEP BRAIN STIMULATION GENERATOR / BATTERY BILATERAL;  Surgeon: Bentley Martinez MD;  Location: [facility] OR     REPLACE PULSE GENERATOR SUBCUTANEOUS BILATERAL  29 Jul 2008    Replaced both soletra     REPLACE PULSE GENERATOR SUBCUTANEOUS BILATERAL  7/2/2013    Procedure: REPLACE PULSE GENERATOR SUBCUTANEOUS BILATERAL;  Bilateral Deep Brain Stimulator Battery Exchange ;  Surgeon: Bentley Martinez MD;  Location: [facility] OR     Right DBS Lead removal  jan 2003    infected right lead removed; was in a nursing home on antiobiotics     STEREOTACTIC INSERTION DEEP BRAIN STIMULATION  12 May 2003    placed lead in may after removal of right thalamic  DBS lead due to infection feb 17 2003     STEREOTACTIC INSERTION DEEP BRAIN STIMULATION  9 jun 2003    revision of right DBS electrode     STEREOTACTIC INSERTION DEEP BRAIN STIMULATION  oct 2002    left thalamic DBS lead placed     STEREOTACTIC INSERTION DEEP BRAIN STIMULATION  oct 2002    right thalamic DBS lead placed     Past Medical History:   Diagnosis Date     1st degree AV block 10/20/2015    ECG shows mild sinus bradycardia with 1st degree heart block and min voltage criteria for LVH; borderline ECG     Anxiety      Dysarthria      Facial numbness      Hearing loss      Hypercholesterolemia      Hypertension      Low back pain      Lymphedema      Macular  degeneration (senile) of retina      Memory loss      Mood change      Osteoporosis, unspecified      PSEUDOBULBAR AFFECT      Recurrent falls      Skin cancer      Tremor      Urinary incontinence, urge      Urinary urgency      Wears glasses      Social History     Socioeconomic History     Marital status:      Spouse name: Not on file     Number of children: Not on file     Years of education: Not on file     Highest education level: Not on file   Occupational History     Not on file   Social Needs     Financial resource strain: Not on file     Food insecurity     Worry: Not on file     Inability: Not on file     Transportation needs     Medical: Not on file     Non-medical: Not on file   Tobacco Use     Smoking status: Never Smoker     Smokeless tobacco: Never Used   Substance and Sexual Activity     Alcohol use: No     Drug use: No     Sexual activity: Not on file   Lifestyle     Physical activity     Days per week: Not on file     Minutes per session: Not on file     Stress: Not on file   Relationships     Social connections     Talks on phone: Not on file     Gets together: Not on file     Attends Yarsanism service: Not on file     Active member of club or organization: Not on file     Attends meetings of clubs or organizations: Not on file     Relationship status: Not on file     Intimate partner violence     Fear of current or ex partner: Not on file     Emotionally abused: Not on file     Physically abused: Not on file     Forced sexual activity: Not on file   Other Topics Concern     Parent/sibling w/ CABG, MI or angioplasty before 65F 55M? Not Asked   Social History Narrative    lives in [place]. . Daughter Mary Ann Mijares.     Family History   Problem Relation Age of Onset     Diabetes Father      Parkinsonism Mother      Parkinsonism Sister      Neurologic Disorder Brother         tremor     Neurologic Disorder Brother         tremor     Cancer Brother         gallbladder      Neurologic Disorder Brother         tremor     Cancer Brother         throat     Connective Tissue Disorder Sister         tremor     Parkinsonism Other         niece     Parkinsonism Other         niece     Current Outpatient Medications   Medication Sig Dispense Refill     aspirin 81 MG tablet Take 1 tablet (81 mg) by mouth daily 90 tablet 0     carboxymethylcellulose (REFRESH) 1 % ophthalmic solution Place 1 drop into both eyes 2 times daily as needed 1 Bottle      Cholecalciferol (VITAMIN D3) 25 MCG (1000 UT) CAPS TAKE 1 CAPSULE BY MOUTH DAILY 90 capsule 3     clobetasol (TEMOVATE) 0.05 % external ointment        escitalopram (LEXAPRO) 10 MG tablet        escitalopram (LEXAPRO) 20 MG tablet Take 10 mg by mouth daily  90 tablet 3     furosemide (LASIX) 40 MG tablet 40mg tab by mouth daily  1     gentamicin (GARAMYCIN) 0.1 % external ointment SHONA AA ON LEG D       IBANdronate (BONIVA) 150 MG tablet Take 1 tablet (150 mg) by mouth every 30 days 1 tablet 11     ketoconazole (NIZORAL) 2 % external shampoo Apply topically daily as needed   0     levothyroxine (SYNTHROID/LEVOTHROID) 50 MCG tablet        Levothyroxine Sodium 75 MCG CAPS Take 75 mcg by mouth daily 30 capsule      Multiple Vitamins-Minerals (OCUVITE PRESERVISION PO) Take 1 tablet by mouth daily        naproxen sodium (ALEVE) 220 MG tablet Take 1 tablet by mouth 2 times daily (with meals).       pravastatin (PRAVACHOL) 20 MG tablet Take 1 tablet (20 mg) by mouth daily 90 tablet 3     Probiotic Product (PROBIOTIC-10 PO) Take by mouth daily           Medications                                                                                                                                                                                                                Diagnosis/Summary/Recommendations:    PATIENT: Jennifer Ferrera  89 year old female      : 1931    ALLY: 2020    [address] [facility]  [address]  [email or web address]  [phone] (H)   [phone] (M)   Mary Ann Mijares  [phone]    Call :: [phone]     Assessment:     Video visit    Plan:        Coding statement:   Duration of  Services: patient care and care coordination was 25 minutes  Greater than 50% of this visit was spent in counseling and coordination of care.     Iban Neves MD     ______________________________________    Last visit date and details:              ______________________________________      Patient was asked about 14 Review of systems including changes in vision (dry eyes, double vision), hearing, heart, lungs, musculoskeletal, depression, anxiety, snoring, RBD, insomnia, urinary frequency, urinary urgency, constipation, swallowing problems, hematological, ID, allergies, skin problems: seborrhea, endocrinological: thyroid, diabetes, cholesterol; balance, weight changes, and other neurological problems and these were not significant at this time except for   Patient Active Problem List   Diagnosis     Tremor     S/P brain surgery     Lymphedema     Anxiety     PSEUDOBULBAR AFFECT     Wears glasses     Hearing loss     Urinary incontinence, urge     Urinary urgency     Memory loss     Skin cancer     Hypercholesterolemia     Nocturia     Mood change     Macular degeneration (senile) of retina     Recurrent falls     Osteoporosis     Dysarthria     Facial numbness     Tooth infection     Basal cell carcinoma of deltoid region, right     1st degree AV block     Low back pain     Bradycardia     Depression     Essential tremor     GERD (gastroesophageal reflux disease)     Osteomyelitis (H)     Post-operative state     Sinus tachycardia     Hyperlipidemia          Allergies   Allergen Reactions     Latex      Vioxx      Pt. Is unaware of any allergy to this med.     Past Surgical History:   Procedure Laterality Date     CHOLECYSTECTOMY       Excision of basal cell carcinoma from RUE  Sep  2015    right shoulder     EXTRACAPSULAR CATARACT EXTRATION WITH INTRAOCULAR LENS IMPLANT       EXTRACAPSULAR CATARACT EXTRATION WITH INTRAOCULAR LENS IMPLANT       HYSTERECTOMY       REPLACE DEEP BRAIN STIMULATION GENERATOR / BATTERY BILATERAL Bilateral 11/8/2016    Procedure: REPLACE DEEP BRAIN STIMULATION GENERATOR / BATTERY BILATERAL;  Surgeon: Bentley Martinez MD;  Location: [facility] OR     REPLACE PULSE GENERATOR SUBCUTANEOUS BILATERAL  29 Jul 2008    Replaced both soletra     REPLACE PULSE GENERATOR SUBCUTANEOUS BILATERAL  7/2/2013    Procedure: REPLACE PULSE GENERATOR SUBCUTANEOUS BILATERAL;  Bilateral Deep Brain Stimulator Battery Exchange ;  Surgeon: Bentley Martinez MD;  Location: [facility] OR     Right DBS Lead removal  jan 2003    infected right lead removed; was in a nursing home on antiobiotics     STEREOTACTIC INSERTION DEEP BRAIN STIMULATION  12 May 2003    placed lead in may after removal of right thalamic  DBS lead due to infection feb 17 2003     STEREOTACTIC INSERTION DEEP BRAIN STIMULATION  9 jun 2003    revision of right DBS electrode     STEREOTACTIC INSERTION DEEP BRAIN STIMULATION  oct 2002    left thalamic DBS lead placed     STEREOTACTIC INSERTION DEEP BRAIN STIMULATION  oct 2002    right thalamic DBS lead placed     Past Medical History:   Diagnosis Date     1st degree AV block 10/20/2015    ECG shows mild sinus bradycardia with 1st degree heart block and min voltage criteria for LVH; borderline ECG     Anxiety      Dysarthria      Facial numbness      Hearing loss      Hypercholesterolemia      Hypertension      Low back pain      Lymphedema      Macular degeneration (senile) of retina      Memory loss      Mood change      Osteoporosis, unspecified      PSEUDOBULBAR AFFECT      Recurrent falls      Skin cancer      Tremor      Urinary incontinence, urge      Urinary urgency      Wears glasses      Social History     Socioeconomic History     Marital status:      Spouse name:  Not on file     Number of children: Not on file     Years of education: Not on file     Highest education level: Not on file   Occupational History     Not on file   Social Needs     Financial resource strain: Not on file     Food insecurity     Worry: Not on file     Inability: Not on file     Transportation needs     Medical: Not on file     Non-medical: Not on file   Tobacco Use     Smoking status: Never Smoker     Smokeless tobacco: Never Used   Substance and Sexual Activity     Alcohol use: No     Drug use: No     Sexual activity: Not on file   Lifestyle     Physical activity     Days per week: Not on file     Minutes per session: Not on file     Stress: Not on file   Relationships     Social connections     Talks on phone: Not on file     Gets together: Not on file     Attends Voodoo service: Not on file     Active member of club or organization: Not on file     Attends meetings of clubs or organizations: Not on file     Relationship status: Not on file     Intimate partner violence     Fear of current or ex partner: Not on file     Emotionally abused: Not on file     Physically abused: Not on file     Forced sexual activity: Not on file   Other Topics Concern     Parent/sibling w/ CABG, MI or angioplasty before 65F 55M? Not Asked   Social History Narrative    lives in [place]. . Daughter Mary Ann Mijares.       Drug and lactation database from the [place] [employer]:  http://toxnet.nlm.nih.gov/cgi-bin/sis/htmlgen?LACT      B/P: Data Unavailable, T: Data Unavailable, P: Data Unavailable, R: Data Unavailable 0 lbs 0 oz  not currently breastfeeding., There is no height or weight on file to calculate BMI.  Medications and Vitals not listed above were documented in the cart and reviewed by me.     Current Outpatient Medications   Medication Sig Dispense Refill     aspirin 81 MG tablet Take 1 tablet (81 mg) by mouth daily 90 tablet 0     carboxymethylcellulose (REFRESH) 1 %  ophthalmic solution Place 1 drop into both eyes 2 times daily as needed 1 Bottle      Cholecalciferol (VITAMIN D3) 25 MCG (1000 UT) CAPS TAKE 1 CAPSULE BY MOUTH DAILY 90 capsule 3     escitalopram (LEXAPRO) 10 MG tablet        escitalopram (LEXAPRO) 20 MG tablet Take 10 mg by mouth daily  90 tablet 3     furosemide (LASIX) 40 MG tablet 40mg tab by mouth daily  1     IBANdronate (BONIVA) 150 MG tablet Take 1 tablet (150 mg) by mouth every 30 days 1 tablet 11     ketoconazole (NIZORAL) 2 % external shampoo Apply topically daily as needed   0     levothyroxine (SYNTHROID/LEVOTHROID) 50 MCG tablet        Levothyroxine Sodium 75 MCG CAPS Take 75 mcg by mouth daily 30 capsule      Multiple Vitamins-Minerals (OCUVITE PRESERVISION PO) Take 1 tablet by mouth daily        naproxen sodium (ALEVE) 220 MG tablet Take 1 tablet by mouth 2 times daily (with meals).       pravastatin (PRAVACHOL) 20 MG tablet Take 1 tablet (20 mg) by mouth daily 90 tablet 3     Probiotic Product (PROBIOTIC-10 PO) Take by mouth daily           Iban Neves MD

## 2020-12-11 ENCOUNTER — TELEPHONE (OUTPATIENT)
Dept: NEUROLOGY | Facility: CLINIC | Age: 85
End: 2020-12-11

## 2020-12-11 ENCOUNTER — VIRTUAL VISIT (OUTPATIENT)
Dept: NEUROLOGY | Facility: CLINIC | Age: 85
End: 2020-12-11
Payer: COMMERCIAL

## 2020-12-11 ENCOUNTER — MYC MEDICAL ADVICE (OUTPATIENT)
Dept: NEUROLOGY | Facility: CLINIC | Age: 85
End: 2020-12-11

## 2020-12-11 DIAGNOSIS — R25.1 TREMOR: Primary | ICD-10-CM

## 2020-12-11 PROCEDURE — 99214 OFFICE O/P EST MOD 30 MIN: CPT | Mod: 95 | Performed by: PSYCHIATRY & NEUROLOGY

## 2020-12-11 RX ORDER — CLOBETASOL PROPIONATE 0.5 MG/G
OINTMENT TOPICAL PRN
COMMUNITY
Start: 2020-10-07 | End: 2020-12-23

## 2020-12-11 RX ORDER — GENTAMICIN SULFATE 1 MG/G
OINTMENT TOPICAL PRN
COMMUNITY
Start: 2020-10-21 | End: 2020-12-23

## 2020-12-11 NOTE — TELEPHONE ENCOUNTER
"Spoke to daughter and patient. Remotely checked bilateral IPG voltage. Both left and right IPGs are at \"PRABHAKAR\". Patient has video appointment with Dr. Collins for replacement consult on 12/21 at 1:00-2:00. Patient aware.    Added name to database.  "

## 2020-12-11 NOTE — PROGRESS NOTES
"Jennifer Ferrera is a 89 year old female who is being evaluated via a billable telephone visit.      The patient has been notified of following:     \"This telephone visit will be conducted via a call between you and your physician/provider. We have found that certain health care needs can be provided without the need for a physical exam.  This service lets us provide the care you need with a short phone conversation.  If a prescription is necessary we can send it directly to your pharmacy.  If lab work is needed we can place an order for that and you can then stop by our lab to have the test done at a later time.    Telephone visits are billed at different rates depending on your insurance coverage. During this emergency period, for some insurers they may be billed the same as an in-person visit.  Please reach out to your insurance provider with any questions.    If during the course of the call the physician/provider feels a telephone visit is not appropriate, you will not be charged for this service.\"    Patient has given verbal consent for Telephone visit?YES    What phone number would you like to be contacted at? [phone]    How would you like to obtain your AVS? Jason Ford, EMT        "

## 2020-12-11 NOTE — LETTER
12/11/2020       RE: Jennifer Ferrera  [address]  [facility] [address]     Dear Colleague,    Thank you for referring your patient, Jennifer Ferrera, to the [facility]UROLOG[facility] [place] at [facility]. Please see a copy of my visit note below.    VIDEO VISIT used [employer]    Date of Visit: December 11, 2020  Name: Jennifer Ferrera  Date of Birth 11/23/1931  [address] [facility] [address] [phone] (H)   [phone] (M)     Mary Ann Mijares  [phone]      Assessment:  (R25.1) Tremor  (primary encounter diagnosis)  Tremors are stable  Last battery replacement was 2016  Had been seen last fall 2019      Gait/Balance/Falls - no falls; using a walker    Exercise/Therapy - does not want therapy at this time.     Cognitive/Driving  - still driving very limited.     Mood has been good   Escitalopram lexapro 10mg     Hallucinations/delusions  - denies    Sleep for 3 hours and then get up an gets up and goes  Back to bed.     GI/Constipation/GERD - has some issues and is on a probiotic  Not taking omeprazole    Vision   Taking ocuvite  Using refresh solution for her arms    Aleve 220mg twice daily for arthritis     She is dealing with an infection of her leg.   She is on a topical antibiotic gentamicin and off oral medication.    Cardiovascular  Atenolol tenormin - not taking  Furosemide lasix 40mg daily    Heme  Aspirin 81mg daily     ENDO  Cholecalciferol vitamin D3 25 mcg 1000 units  Ibandronate boniva 150mg - not clear if taking  Levothyroxine sodium 50mcg daily   Pravastatin pravachol 20mg at night    Eyes  Carboxymethylcellulose refresh 1% ophthalmic solution  MVI ocuvite    Bladder  Oxybutynin ditropan XL 5mg 24 hr tablet - not taking  Oxybutynin ditropan 5mg tablet  - not taking       Medications     6-9am 8am 8pm   ASpirin 81mg   1     Atenolol tenormin 50mg Not taking       Carboxymethylcellulose refresh 1% opthalmic solution    "      Cholecalciferol vitamin d 1000 units 25 mcg   1     Escitalopram lexapro 10mg 1     Furosemide lasix 40mg   1     Ibandronate boniva 150mg Not clear if taking       Ketaconazole nizoral 2% external shampoo      Levothyroxine sodium 50mcg 1       MVI ocuvite      Naproxen aleve 220mg   1 1   Omeprazole 20mg  not taking      Oxybutynin ditropan XL 5mg 24 hr tablet   ?taking     Oxybutynin ditropan 5mg tablet   Not taking     Pravastatin pravachol 20mg    1     Probiotic  1                                        Plan:    Will need to come in and have her battery reviewed in the next week  2016 had batteries replaced.  Has not been seen since October 2019    Medical Decision Making:  #  2 Chronic medical conditions  0 review and/or interpretation of unique test or documentation from a provider outside of neurology  1 Independent historian provided additional details  0 Prescription drug management and review of potential side effects and/or monitoring for side effects  No Health impacted by social determinants of health    I have reviewed the note as documented above.  This accurately captures the substance of my conversation with the patient.  Patient contact time  25  minutes. Starting and end time of visit  1025am - 1050am    Over 50% of this visit was spent in patient care and care coordination at this visit (Starting 1/1/2021 time documented will include total time spent on the day of visit related to this individual's care)    Iban Neves MD      ------------------------------------------------------------------------------------------------------------------------------------------------------------------------    Video-Visit Details    The patient has been notified of following:     \"After a review of the patient s situation, this visit was changed from an in-person visit to a video visit to reduce the risk of COVID-19 exposure.   The patient is being evaluated via a billable video visit.\"    \"This video " "visit will be conducted via a call between you and your physician/provider. We have found that certain health care needs can be provided without the need for an in-person physical exam.  This service lets us provide the care you need with a video conversation.  If a prescription is necessary we can send it directly to your pharmacy.  If lab work is needed we can place an order for that and you can then stop by our lab to have the test done at a later time.    If during the course of the call the physician/provider feels a video visit is not appropriate, you will not be charged for this service.\"     Patient has given verbal consent for Video visit? Yes    Patient would like the video invitation sent by:     Type of service:  Video Visit    Video Start Time:     Video End Time (time video stopped):     Duration:  minutes - see above    Originating Location (pt. Location):     Distant Location (provider location):  [facility] [place]     Mode of Communication:  Video Conference via [employer] Voice[employer] (and if not possible then doximity)      Iban Neves MD      --------------------------------------------------------------------------------------------------------------    Jennifer Ferrera is a 89 year old female who is being evaluated via a billable video visit.      Charts reviewed  Consult from  Images reviewed        I have reviewed and updated the patient's Past Medical History, Social History, Family History and Medication List.    ALLERGIES  Adhesive tape, Latex, and Vioxx    Lasts visit details if there was a last visit:       14 Review of systems  are negative except for   Patient Active Problem List   Diagnosis     Tremor     S/P brain surgery     Lymphedema     Anxiety     PSEUDOBULBAR AFFECT     Wears glasses     Hearing loss     Urinary incontinence, urge     Urinary urgency     Memory loss     Skin cancer     Hypercholesterolemia     Nocturia     Mood change     Macular degeneration " (senile) of retina     Recurrent falls     Osteoporosis     Dysarthria     Facial numbness     Tooth infection     Basal cell carcinoma of deltoid region, right     1st degree AV block     Low back pain     Bradycardia     Depression     Essential tremor     GERD (gastroesophageal reflux disease)     Osteomyelitis (H)     Post-operative state     Sinus tachycardia     Hyperlipidemia        Allergies   Allergen Reactions     Adhesive Tape      Latex      Vioxx      Pt. Is unaware of any allergy to this med.     Past Surgical History:   Procedure Laterality Date     CHOLECYSTECTOMY       Excision of basal cell carcinoma from RUE  Sep 2015    right shoulder     EXTRACAPSULAR CATARACT EXTRATION WITH INTRAOCULAR LENS IMPLANT       EXTRACAPSULAR CATARACT EXTRATION WITH INTRAOCULAR LENS IMPLANT       HYSTERECTOMY       REPLACE DEEP BRAIN STIMULATION GENERATOR / BATTERY BILATERAL Bilateral 11/8/2016    Procedure: REPLACE DEEP BRAIN STIMULATION GENERATOR / BATTERY BILATERAL;  Surgeon: Bentley Martinez MD;  Location: [facility] OR     REPLACE PULSE GENERATOR SUBCUTANEOUS BILATERAL  29 Jul 2008    Replaced both soletra     REPLACE PULSE GENERATOR SUBCUTANEOUS BILATERAL  7/2/2013    Procedure: REPLACE PULSE GENERATOR SUBCUTANEOUS BILATERAL;  Bilateral Deep Brain Stimulator Battery Exchange ;  Surgeon: Bentley Martinez MD;  Location: [facility] OR     Right DBS Lead removal  jan 2003    infected right lead removed; was in a nursing home on antiobiotics     STEREOTACTIC INSERTION DEEP BRAIN STIMULATION  12 May 2003    placed lead in may after removal of right thalamic  DBS lead due to infection feb 17 2003     STEREOTACTIC INSERTION DEEP BRAIN STIMULATION  9 jun 2003    revision of right DBS electrode     STEREOTACTIC INSERTION DEEP BRAIN STIMULATION  oct 2002    left thalamic DBS lead placed     STEREOTACTIC INSERTION DEEP BRAIN STIMULATION  oct 2002    right thalamic DBS lead placed     Past Medical History:   Diagnosis Date      1st degree AV block 10/20/2015    ECG shows mild sinus bradycardia with 1st degree heart block and min voltage criteria for LVH; borderline ECG     Anxiety      Dysarthria      Facial numbness      Hearing loss      Hypercholesterolemia      Hypertension      Low back pain      Lymphedema      Macular degeneration (senile) of retina      Memory loss      Mood change      Osteoporosis, unspecified      PSEUDOBULBAR AFFECT      Recurrent falls      Skin cancer      Tremor      Urinary incontinence, urge      Urinary urgency      Wears glasses      Social History     Socioeconomic History     Marital status:      Spouse name: Not on file     Number of children: Not on file     Years of education: Not on file     Highest education level: Not on file   Occupational History     Not on file   Social Needs     Financial resource strain: Not on file     Food insecurity     Worry: Not on file     Inability: Not on file     Transportation needs     Medical: Not on file     Non-medical: Not on file   Tobacco Use     Smoking status: Never Smoker     Smokeless tobacco: Never Used   Substance and Sexual Activity     Alcohol use: No     Drug use: No     Sexual activity: Not on file   Lifestyle     Physical activity     Days per week: Not on file     Minutes per session: Not on file     Stress: Not on file   Relationships     Social connections     Talks on phone: Not on file     Gets together: Not on file     Attends Confucianism service: Not on file     Active member of club or organization: Not on file     Attends meetings of clubs or organizations: Not on file     Relationship status: Not on file     Intimate partner violence     Fear of current or ex partner: Not on file     Emotionally abused: Not on file     Physically abused: Not on file     Forced sexual activity: Not on file   Other Topics Concern     Parent/sibling w/ CABG, MI or angioplasty before 65F 55M? Not Asked   Social History Narrative    lives in [place] [facility]  [place]. . Daughter Mary Ann Mijares.     Family History   Problem Relation Age of Onset     Diabetes Father      Parkinsonism Mother      Parkinsonism Sister      Neurologic Disorder Brother         tremor     Neurologic Disorder Brother         tremor     Cancer Brother         gallbladder     Neurologic Disorder Brother         tremor     Cancer Brother         throat     Connective Tissue Disorder Sister         tremor     Parkinsonism Other         niece     Parkinsonism Other         niece     Current Outpatient Medications   Medication Sig Dispense Refill     aspirin 81 MG tablet Take 1 tablet (81 mg) by mouth daily 90 tablet 0     carboxymethylcellulose (REFRESH) 1 % ophthalmic solution Place 1 drop into both eyes 2 times daily as needed 1 Bottle      Cholecalciferol (VITAMIN D3) 25 MCG (1000 UT) CAPS TAKE 1 CAPSULE BY MOUTH DAILY 90 capsule 3     clobetasol (TEMOVATE) 0.05 % external ointment        escitalopram (LEXAPRO) 10 MG tablet        escitalopram (LEXAPRO) 20 MG tablet Take 10 mg by mouth daily  90 tablet 3     furosemide (LASIX) 40 MG tablet 40mg tab by mouth daily  1     gentamicin (GARAMYCIN) 0.1 % external ointment SHONA AA ON LEG D       IBANdronate (BONIVA) 150 MG tablet Take 1 tablet (150 mg) by mouth every 30 days 1 tablet 11     ketoconazole (NIZORAL) 2 % external shampoo Apply topically daily as needed   0     levothyroxine (SYNTHROID/LEVOTHROID) 50 MCG tablet        Levothyroxine Sodium 75 MCG CAPS Take 75 mcg by mouth daily 30 capsule      Multiple Vitamins-Minerals (OCUVITE PRESERVISION PO) Take 1 tablet by mouth daily        naproxen sodium (ALEVE) 220 MG tablet Take 1 tablet by mouth 2 times daily (with meals).       pravastatin (PRAVACHOL) 20 MG tablet Take 1 tablet (20 mg) by mouth daily 90 tablet 3     Probiotic Product (PROBIOTIC-10 PO) Take by mouth daily           Medications                                                                                                                                                                                                                 Diagnosis/Summary/Recommendations:    PATIENT: Jennifer Ferrera  89 year old female      : 1931    ALLY: 2020    [address]   [facility] [address]  [email or web address]  [phone] (H)   [phone] (M)   Mary Ann Mijares  [phone]    Call :: [phone]     Assessment:     Video visit    Plan:        Coding statement:   Duration of  Services: patient care and care coordination was 25 minutes  Greater than 50% of this visit was spent in counseling and coordination of care.     Iban Neves MD     ______________________________________    Last visit date and details:              ______________________________________      Patient was asked about 14 Review of systems including changes in vision (dry eyes, double vision), hearing, heart, lungs, musculoskeletal, depression, anxiety, snoring, RBD, insomnia, urinary frequency, urinary urgency, constipation, swallowing problems, hematological, ID, allergies, skin problems: seborrhea, endocrinological: thyroid, diabetes, cholesterol; balance, weight changes, and other neurological problems and these were not significant at this time except for   Patient Active Problem List   Diagnosis     Tremor     S/P brain surgery     Lymphedema     Anxiety     PSEUDOBULBAR AFFECT     Wears glasses     Hearing loss     Urinary incontinence, urge     Urinary urgency     Memory loss     Skin cancer     Hypercholesterolemia     Nocturia     Mood change     Macular degeneration (senile) of retina     Recurrent falls     Osteoporosis     Dysarthria     Facial numbness     Tooth infection     Basal cell carcinoma of deltoid region, right     1st degree AV block     Low back pain     Bradycardia     Depression     Essential tremor     GERD (gastroesophageal reflux disease)     Osteomyelitis (H)     Post-operative state     Sinus  tachycardia     Hyperlipidemia          Allergies   Allergen Reactions     Latex      Vioxx      Pt. Is unaware of any allergy to this med.     Past Surgical History:   Procedure Laterality Date     CHOLECYSTECTOMY       Excision of basal cell carcinoma from RUE  Sep 2015    right shoulder     EXTRACAPSULAR CATARACT EXTRATION WITH INTRAOCULAR LENS IMPLANT       EXTRACAPSULAR CATARACT EXTRATION WITH INTRAOCULAR LENS IMPLANT       HYSTERECTOMY       REPLACE DEEP BRAIN STIMULATION GENERATOR / BATTERY BILATERAL Bilateral 11/8/2016    Procedure: REPLACE DEEP BRAIN STIMULATION GENERATOR / BATTERY BILATERAL;  Surgeon: Bentley Martinez MD;  Location: [facility] OR     REPLACE PULSE GENERATOR SUBCUTANEOUS BILATERAL  29 Jul 2008    Replaced both soletra     REPLACE PULSE GENERATOR SUBCUTANEOUS BILATERAL  7/2/2013    Procedure: REPLACE PULSE GENERATOR SUBCUTANEOUS BILATERAL;  Bilateral Deep Brain Stimulator Battery Exchange ;  Surgeon: Bentley Martinez MD;  Location: [facility] OR     Right DBS Lead removal  jan 2003    infected right lead removed; was in a nursing home on antiobiotics     STEREOTACTIC INSERTION DEEP BRAIN STIMULATION  12 May 2003    placed lead in may after removal of right thalamic  DBS lead due to infection feb 17 2003     STEREOTACTIC INSERTION DEEP BRAIN STIMULATION  9 jun 2003    revision of right DBS electrode     STEREOTACTIC INSERTION DEEP BRAIN STIMULATION  oct 2002    left thalamic DBS lead placed     STEREOTACTIC INSERTION DEEP BRAIN STIMULATION  oct 2002    right thalamic DBS lead placed     Past Medical History:   Diagnosis Date     1st degree AV block 10/20/2015    ECG shows mild sinus bradycardia with 1st degree heart block and min voltage criteria for LVH; borderline ECG     Anxiety      Dysarthria      Facial numbness      Hearing loss      Hypercholesterolemia      Hypertension      Low back pain      Lymphedema      Macular degeneration (senile) of retina      Memory loss      Mood change       Osteoporosis, unspecified      PSEUDOBULBAR AFFECT      Recurrent falls      Skin cancer      Tremor      Urinary incontinence, urge      Urinary urgency      Wears glasses      Social History     Socioeconomic History     Marital status:      Spouse name: Not on file     Number of children: Not on file     Years of education: Not on file     Highest education level: Not on file   Occupational History     Not on file   Social Needs     Financial resource strain: Not on file     Food insecurity     Worry: Not on file     Inability: Not on file     Transportation needs     Medical: Not on file     Non-medical: Not on file   Tobacco Use     Smoking status: Never Smoker     Smokeless tobacco: Never Used   Substance and Sexual Activity     Alcohol use: No     Drug use: No     Sexual activity: Not on file   Lifestyle     Physical activity     Days per week: Not on file     Minutes per session: Not on file     Stress: Not on file   Relationships     Social connections     Talks on phone: Not on file     Gets together: Not on file     Attends Mosque service: Not on file     Active member of club or organization: Not on file     Attends meetings of clubs or organizations: Not on file     Relationship status: Not on file     Intimate partner violence     Fear of current or ex partner: Not on file     Emotionally abused: Not on file     Physically abused: Not on file     Forced sexual activity: Not on file   Other Topics Concern     Parent/sibling w/ CABG, MI or angioplasty before 65F 55M? Not Asked   Social History Narrative    lives in [place]. . Daughter Mary Ann Mijares.       Drug and lactation database from the [place] [employer] of Medicine:  http://toxnet.nlm.nih.gov/cgi-bin/sis/htmlgen?LACT      B/P: Data Unavailable, T: Data Unavailable, P: Data Unavailable, R: Data Unavailable 0 lbs 0 oz  not currently breastfeeding., There is no height or weight on file to calculate  "BMI.  Medications and Vitals not listed above were documented in the cart and reviewed by me.     Current Outpatient Medications   Medication Sig Dispense Refill     aspirin 81 MG tablet Take 1 tablet (81 mg) by mouth daily 90 tablet 0     carboxymethylcellulose (REFRESH) 1 % ophthalmic solution Place 1 drop into both eyes 2 times daily as needed 1 Bottle      Cholecalciferol (VITAMIN D3) 25 MCG (1000 UT) CAPS TAKE 1 CAPSULE BY MOUTH DAILY 90 capsule 3     escitalopram (LEXAPRO) 10 MG tablet        escitalopram (LEXAPRO) 20 MG tablet Take 10 mg by mouth daily  90 tablet 3     furosemide (LASIX) 40 MG tablet 40mg tab by mouth daily  1     IBANdronate (BONIVA) 150 MG tablet Take 1 tablet (150 mg) by mouth every 30 days 1 tablet 11     ketoconazole (NIZORAL) 2 % external shampoo Apply topically daily as needed   0     levothyroxine (SYNTHROID/LEVOTHROID) 50 MCG tablet        Levothyroxine Sodium 75 MCG CAPS Take 75 mcg by mouth daily 30 capsule      Multiple Vitamins-Minerals (OCUVITE PRESERVISION PO) Take 1 tablet by mouth daily        naproxen sodium (ALEVE) 220 MG tablet Take 1 tablet by mouth 2 times daily (with meals).       pravastatin (PRAVACHOL) 20 MG tablet Take 1 tablet (20 mg) by mouth daily 90 tablet 3     Probiotic Product (PROBIOTIC-10 PO) Take by mouth daily           Iban Ferrera is a 89 year old female who is being evaluated via a billable telephone visit.      The patient has been notified of following:     \"This telephone visit will be conducted via a call between you and your physician/provider. We have found that certain health care needs can be provided without the need for a physical exam.  This service lets us provide the care you need with a short phone conversation.  If a prescription is necessary we can send it directly to your pharmacy.  If lab work is needed we can place an order for that and you can then stop by our lab to have the test done at a later time.    Telephone " "visits are billed at different rates depending on your insurance coverage. During this emergency period, for some insurers they may be billed the same as an in-person visit.  Please reach out to your insurance provider with any questions.    If during the course of the call the physician/provider feels a telephone visit is not appropriate, you will not be charged for this service.\"    Patient has given verbal consent for Telephone visit?YES    What phone number would you like to be contacted at? [phone]    How would you like to obtain your AVS? Jason Ford, EMT            Again, thank you for allowing me to participate in the care of your patient.      Sincerely,    Iban Neves MD      "

## 2020-12-16 ENCOUNTER — MYC REFILL (OUTPATIENT)
Dept: NEUROLOGY | Facility: CLINIC | Age: 85
End: 2020-12-16

## 2020-12-16 DIAGNOSIS — G25.0 ESSENTIAL TREMOR: ICD-10-CM

## 2020-12-16 NOTE — TELEPHONE ENCOUNTER
Rx Authorization:    Requested Medication/ Dose: Cholecalciferol ( vitamin D3) 25 MCG (1000 UT) CAPS    Date last refill ordered: 1/27/210    Quantity ordered: 90 caps    # refills: 3    Date of last clinic visit with ordering provider: 12/11/20    Date of next clinic visit with ordering provider: 12/21/20    All pertinent protocol data (lab date/result):     Include pertinent information from patients message:

## 2020-12-21 ENCOUNTER — PRE VISIT (OUTPATIENT)
Dept: NEUROSURGERY | Facility: CLINIC | Age: 85
End: 2020-12-21

## 2020-12-21 ENCOUNTER — VIRTUAL VISIT (OUTPATIENT)
Dept: NEUROSURGERY | Facility: CLINIC | Age: 85
End: 2020-12-21
Payer: COMMERCIAL

## 2020-12-21 DIAGNOSIS — G25.0 ESSENTIAL TREMOR: Primary | ICD-10-CM

## 2020-12-21 DIAGNOSIS — R25.1 TREMOR: ICD-10-CM

## 2020-12-21 DIAGNOSIS — Z96.89 STATUS POST DEEP BRAIN STIMULATOR PLACEMENT: ICD-10-CM

## 2020-12-21 DIAGNOSIS — Z45.42 END OF BATTERY LIFE OF DEEP BRAIN STIMULATOR: ICD-10-CM

## 2020-12-21 PROCEDURE — 99202 OFFICE O/P NEW SF 15 MIN: CPT | Mod: 95 | Performed by: NEUROLOGICAL SURGERY

## 2020-12-21 NOTE — PROGRESS NOTES
"Jennifer Ferrera is a 89 year old female who is being evaluated via a billable video visit.      The patient has been notified of following:     \"This video visit will be conducted via a call between you and your physician/provider. We have found that certain health care needs can be provided without the need for an in-person physical exam.  This service lets us provide the care you need with a video conversation.  If a prescription is necessary we can send it directly to your pharmacy.  If lab work is needed we can place an order for that and you can then stop by our lab to have the test done at a later time.    Video visits are billed at different rates depending on your insurance coverage.  Please reach out to your insurance provider with any questions.    If during the course of the call the physician/provider feels a video visit is not appropriate, you will not be charged for this service.\"    Patient has given verbal consent for Video visit? Yes  How would you like to obtain your AVS? Mail a copy  If you are dropped from the video visit, the video invite should be resent to: Send to e-mail at: [email or web address]  Will anyone else be joining your video visit? No        Video-Visit Details    Type of service:  Video Visit    Video Start Time: 12:58 PM  Video End Time: 1:15 PM    Originating Location (pt. Location): Home    Distant Location (provider location):  [facility] NEUROSURGERY [facility]     Platform used for Video Visit: Kip Jorgensen      Additional provider notes: insert own note template here      Ms. Jennifer Ferrera complains of    Chief Complaint   Patient presents with     Consult     VIDEO VISIT NEW, DEPLETED DBS GENERATORS/BATTERIES OVER BILATERAL CHEST WALL         I have reviewed and updated the patient's Past Medical History, Social History, Family History and Medication List.    ALLERGIES       Allergies   Allergen Reactions     Adhesive Tape      Latex      Vioxx      Pt. Is " unaware of any allergy to this med.         ASSESSMENT  89 Year old female  1.  Essential tremor  2.  S/p bilateral DBS placement, October of 2002  3.  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  S/p numerous replacement of bilateral DBS generators/batteries over bilateral chest wall, in 7/29/2008, 7/2/2013, and last replacement on 11/8/2016  5.  Depleted bilateral DBS generators/batteries over bilateral chest wall      I have reviewed the note as documented above.  This accurately captures the substance of my conversation with the patient.  The following were discussed in detail.    Patient was seen in video visit with her daughter, Ms. Mary Ann Mijares, at her side who also provided information.    Ms. Jennifer Ferrera presents with depleted deep brain stimulator generators/batteries over the bilateral chest wall.  She is a 89 year old female with long standing tremor.  She underwent bilateral DBS placement back in October of 2002 with electrodes in the bilateral ventral intermediate nucleus of the thalamus.  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  Since then, she has had bilateral DBS system with numerous generators/batteries being replaced.  She had the replacement surgeries in 7/19/2008, 7/2/2013 and 11/8/2016.  Her last replacement surgery was with Dr. Martinez.  It appears that her generators/batteries last her about four years now.      She is followed by Dr. Neves and she was seen via video visit on 12/11/2020.  She reached out to the neurology team that her generators/batteries were at [facility].    Patient states that she is in her usual state of health.  She denies any recent illness but she is being treated for a leg sore which is healing ok.  She has one more treatment.  She was on an antibiotic some time ago for this but she is not on it now.  There is no report of active infection.  She does take a baby aspirin, 81 mg, daily and she took a dose  today.  She is not on any other anticoagulants or antiplatelet therapy.    In terms of her device, she has the DBS on all the time.  Per patient's daughter, it is on the highest setting for her at this time.  She is getting more tremors recently.  She denies any exposed hardware anywhere.  She also denies any abnormal shocks or undesirable stimulation effects.  She is also ok with the current locations of the generators/batteries.  She has bilateral Activa SC, model 20931.      We discussed the surgical procedure for replacing the DBS generators/batteries over the bilateral chest wall.  Risks, benefits, alternative therapies were discussed with the patient, including but not limited to infection and bleeding.  The increased risk for infection was discussed given her leg sore.  However, we did agree that since her devices are at [facility], the benefit outweigh the risk of infection.  She will be given preoperative and postoperative antibiotics as a normal course.  This surgical procedure will be performed under MAC with local anesthetic.  The thinned part of the wound/pocket may be corrected with mobilization of the tissue under the incision.  The pocket may need to be enlarged to minimize the stress on the closure.  Surgical procedure was discussed in detail.  All questions were answered, and the patient and her daughter expressed understanding.      PLAN  Replacement of bilateral DBS generators/batteries over bilateral chest wall  PAC  Preop labs  Hold aspirin at least 5 days prior to surgery  Negative Covid test within 4 days of surgery  Please call daughter, Ms. Mary Ann Mijares at [phone], to make all the arrangements      Video-Visit Details    Type of service:  Video Visit    Video Start Time: 12:58 PM  Video End Time: 1:15 PM  Video -Visit Time: 17 minutes    Originating Location (pt. Location): Home    Distant Location (provider location):  [facility] NEUROSURGERY     Platform used for Video Visit:  Kip Collins MD, PhD      17 minutes were spent face to face with the patient of which more than 50% of the time was spent counseling and discussing the above issues regarding diagnosis, differential, treatment options, and steps for further evaluation.

## 2020-12-21 NOTE — LETTER
"12/21/2020      RE: Jennifer Ferrera  [address]  [facility] [address]       Jennifer Ferrera is a 89 year old female who is being evaluated via a billable video visit.      The patient has been notified of following:     \"This video visit will be conducted via a call between you and your physician/provider. We have found that certain health care needs can be provided without the need for an in-person physical exam.  This service lets us provide the care you need with a video conversation.  If a prescription is necessary we can send it directly to your pharmacy.  If lab work is needed we can place an order for that and you can then stop by our lab to have the test done at a later time.    Video visits are billed at different rates depending on your insurance coverage.  Please reach out to your insurance provider with any questions.    If during the course of the call the physician/provider feels a video visit is not appropriate, you will not be charged for this service.\"    Patient has given verbal consent for Video visit? Yes  How would you like to obtain your AVS? Mail a copy  If you are dropped from the video visit, the video invite should be resent to: Send to e-mail at: [email or web address]  Will anyone else be joining your video visit? No      Video-Visit Details    Type of service:  Video Visit    Video Start Time: 12:58 PM  Video End Time: 1:15 PM    Originating Location (pt. Location): Home    Distant Location (provider location):  [facility] NEUROSURGERY [facility]     Platform used for Video Visit: Kip Jorgensen      Ms. Jennifer Ferrera complains of    Chief Complaint   Patient presents with     Consult     VIDEO VISIT NEW, DEPLETED DBS GENERATORS/BATTERIES OVER BILATERAL CHEST WALL         I have reviewed and updated the patient's Past Medical History, Social History, Family History and Medication List.    ALLERGIES       Allergies   Allergen Reactions     Adhesive Tape      Latex  "     Vioxx      Pt. Is unaware of any allergy to this med.       ASSESSMENT  89 Year old female  1.  Essential tremor  2.  S/p bilateral DBS placement, October of 2002  3.  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  S/p numerous replacement of bilateral DBS generators/batteries over bilateral chest wall, in 7/29/2008, 7/2/2013, and last replacement on 11/8/2016  5.  Depleted bilateral DBS generators/batteries over bilateral chest wall      I have reviewed the note as documented above.  This accurately captures the substance of my conversation with the patient.  The following were discussed in detail.    Patient was seen in video visit with her daughter, Ms. Mary Ann Mijares, at her side who also provided information.    Ms. Jennifer Ferrera presents with depleted deep brain stimulator generators/batteries over the bilateral chest wall.  She is a 89 year old female with long standing tremor.  She underwent bilateral DBS placement back in October of 2002 with electrodes in the bilateral ventral intermediate nucleus of the thalamus.  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  Since then, she has had bilateral DBS system with numerous generators/batteries being replaced.  She had the replacement surgeries in 7/19/2008, 7/2/2013 and 11/8/2016.  Her last replacement surgery was with Dr. Martinez.  It appears that her generators/batteries last her about four years now.      She is followed by Dr. Neves and she was seen via video visit on 12/11/2020.  She reached out to the neurology team that her generators/batteries were at [facility].    Patient states that she is in her usual state of health.  She denies any recent illness but she is being treated for a leg sore which is healing ok.  She has one more treatment.  She was on an antibiotic some time ago for this but she is not on it now.  There is no report of active infection.  She does take a baby aspirin, 81 mg, daily  and she took a dose today.  She is not on any other anticoagulants or antiplatelet therapy.    In terms of her device, she has the DBS on all the time.  Per patient's daughter, it is on the highest setting for her at this time.  She is getting more tremors recently.  She denies any exposed hardware anywhere.  She also denies any abnormal shocks or undesirable stimulation effects.  She is also ok with the current locations of the generators/batteries.  She has bilateral Activa SC, model 76413.      We discussed the surgical procedure for replacing the DBS generators/batteries over the bilateral chest wall.  Risks, benefits, alternative therapies were discussed with the patient, including but not limited to infection and bleeding.  The increased risk for infection was discussed given her leg sore.  However, we did agree that since her devices are at [facility], the benefit outweigh the risk of infection.  She will be given preoperative and postoperative antibiotics as a normal course.  This surgical procedure will be performed under MAC with local anesthetic.  The thinned part of the wound/pocket may be corrected with mobilization of the tissue under the incision.  The pocket may need to be enlarged to minimize the stress on the closure.  Surgical procedure was discussed in detail.  All questions were answered, and the patient and her daughter expressed understanding.      PLAN  Replacement of bilateral DBS generators/batteries over bilateral chest wall  PAC  Preop labs  Hold aspirin at least 5 days prior to surgery  Negative Covid test within 4 days of surgery  Please call daughter, Ms. Mary Ann Mijares at [phone], to make all the arrangements      Video-Visit Details    Type of service:  Video Visit    Video Start Time: 12:58 PM  Video End Time: 1:15 PM  Video -Visit Time: 17 minutes    Originating Location (pt. Location): Home    Distant Location (provider location):  [facility] NEUROSURGERY     Platform used for Video  Visit: Kip Collins MD, PhD    17 minutes were spent face to face with the patient of which more than 50% of the time was spent counseling and discussing the above issues regarding diagnosis, differential, treatment options, and steps for further evaluation.

## 2020-12-21 NOTE — TELEPHONE ENCOUNTER
FUTURE VISIT INFORMATION      FUTURE VISIT INFORMATION:    Date: 12/21/2020    Time: 1pm    Location[place] [facility]  REFERRAL INFORMATION:    Referring provider:  Dr. Neves     Referring providers clinic:  [facility]     Reason for visit/diagnosis  Tremors     RECORDS REQUESTED FROM:       Clinic name Comments Records Status Imaging Status   Internal Dr. Neves-12/11/2020, 10/22/2020, 10/22/2019 Epic N/A          [facility] CT Head 2/7/2014    CT Cervical Spine 2/7/2014 Care Everywhere Requested to PACS                       12/21/2020-Request for Images faxed to [facility] Radiology-MR @ 620am    12/22/2020-[facility] IMages now in PACS-MR @ 546am

## 2020-12-22 ENCOUNTER — TELEPHONE (OUTPATIENT)
Dept: NEUROSURGERY | Facility: CLINIC | Age: 85
End: 2020-12-22

## 2020-12-22 DIAGNOSIS — Z11.59 ENCOUNTER FOR SCREENING FOR OTHER VIRAL DISEASES: Primary | ICD-10-CM

## 2020-12-22 DIAGNOSIS — Z01.818 PREOPERATIVE EVALUATION TO RULE OUT SURGICAL CONTRAINDICATION: Primary | ICD-10-CM

## 2020-12-22 PROBLEM — Z45.42 END OF BATTERY LIFE OF DEEP BRAIN STIMULATOR: Status: ACTIVE | Noted: 2020-12-22

## 2020-12-22 PROBLEM — Z96.89 STATUS POST DEEP BRAIN STIMULATOR PLACEMENT: Status: ACTIVE | Noted: 2020-12-22

## 2020-12-22 NOTE — TELEPHONE ENCOUNTER
Health Call Center    Phone Message    May a detailed message be left on voicemail: yes     Reason for Call: Other: Mary Ann calling to request a call back due to having some questions regarding Jennifer's pre op. She stated that Jennifer wouldn't be seen until 2:30 Monday afternoon (12/28). She is wondering if Jennifer could have a virtual pre op if available. Please call Mary Ann at your earliest convenience to discuss.     Action Taken: Message routed to:  [facility] ([facility]    Travel Screening: Not Applicable

## 2020-12-22 NOTE — TELEPHONE ENCOUNTER
Patient is scheduled for surgery with Dr. Collins      Spoke or left message with: patients daughter Mary Ann     Date of Surgery: 12/29/20    Location [facility] OR    H&P: Scheduled with PCP    Post op: 01/18/21    Additional imaging/appointments: COVID test [facility] 12:50pm 12/26/20    Surgery packet sent: The nurse will mail it out     Additional comments: The patient is aware they need a PRE-OP/PAC appt at least a couple of weeks before surgery date. We went over the patient needing a  and someone to stay with them for 24 hours after the surgery. The patient was given my direct number for any questions [phone]

## 2020-12-22 NOTE — TELEPHONE ENCOUNTER
FUTURE VISIT INFORMATION      SURGERY INFORMATION:    Date: 20    Location: [facility] OR    Surgeon:  Noe Collins MD    Anesthesia Type:  Combined MAC with Local    Procedure: Replacement of deep brain stimulator generator/battery over bilateral chest wall    Consult: virtual visit 20    RECORDS REQUESTED FROM:       Primary Care Provider: Michelle Carvajal MD- [facility]    Pertinent Medical History: 1st degree AV block, bradycardia    Most recent EKG+ Tracin/10/19- [facility]

## 2020-12-22 NOTE — TELEPHONE ENCOUNTER
Spoke with Mary Ann, who is wondering if pt could be scheduled for a virtual preop appt in our PAC clinic. Pt was not able to get an appt with her PCP until the afternoon before surgery.     Virtual PAC appt now scheduled for 12/23 at 2:15. Mary Ann says pt does not have a cardiac history and said pt has never seen a cardiologist.     I will enter preop lab orders and Mary Ann said she would schedule a lab appointment.     I will send information about preparing for surgery via [employer]. Mary Ann confirms she has access and will print the information for pt. She confirmed that pt has discontinued aspirin.     No further questions at this time.

## 2020-12-23 ENCOUNTER — VIRTUAL VISIT (OUTPATIENT)
Dept: SURGERY | Facility: CLINIC | Age: 85
End: 2020-12-23
Payer: COMMERCIAL

## 2020-12-23 ENCOUNTER — PRE VISIT (OUTPATIENT)
Dept: SURGERY | Facility: CLINIC | Age: 85
End: 2020-12-23

## 2020-12-23 ENCOUNTER — ANESTHESIA EVENT (OUTPATIENT)
Dept: SURGERY | Facility: CLINIC | Age: 85
End: 2020-12-23
Payer: COMMERCIAL

## 2020-12-23 DIAGNOSIS — Z01.818 PRE-OP EXAMINATION: Primary | ICD-10-CM

## 2020-12-23 PROCEDURE — 99202 OFFICE O/P NEW SF 15 MIN: CPT | Mod: 95 | Performed by: PHYSICIAN ASSISTANT

## 2020-12-23 ASSESSMENT — PAIN SCALES - GENERAL: PAINLEVEL: NO PAIN (0)

## 2020-12-23 ASSESSMENT — LIFESTYLE VARIABLES: TOBACCO_USE: 0

## 2020-12-23 ASSESSMENT — ENCOUNTER SYMPTOMS: DYSRHYTHMIAS: 1

## 2020-12-23 NOTE — H&P
Pre-Operative H & P     CC:  Preoperative exam to assess for increased cardiopulmonary risk while undergoing surgery and anesthesia.    Date of Encounter: 12/23/2020  Primary Care Physician:  Michelle Carvajal     Reason for visit: pre operative examination, End of battery life of deep brain stimulator    HPI  Jennifer Ferrera is a 89 year old female who presents for pre-operative H & P in preparation for Replacement of deep brain stimulator generator/battery over bilateral chest wall with Dr. Collins on 12/29/20 at [facility].     The patient is a 89-year-old woman with a past medical history significant for hypertension, hyperlipidemia, first-degree AV block, hearing loss, hypothyroidism, obesity, GERD, constipation, urinary incontinence, anxiety, osteoarthritis and osteoporosis.  The patient has a long history of an essential tremor and underwent deep brain stimulator in 2002.  She underwent removal in 2003 due to infection and then replacement.  She has been followed by neurology and had replacement of her generator/battery in 2008, 2013 and 2016.  She most recently followed up with the neurology department for decreased battery life and increased symptoms of her tremor.  She met Dr. Collins virtually yesterday and they discussed her surgical treatment options.  The patient is now scheduled for the procedure as above.    History is obtained from the patient, patient's daugther and chart review    Past Medical History  Past Medical History:   Diagnosis Date     1st degree AV block 10/20/2015    ECG shows mild sinus bradycardia with 1st degree heart block and min voltage criteria for LVH; borderline ECG     Anxiety      Dysarthria      Facial numbness      Hearing loss      Hypercholesterolemia      Hypertension      Low back pain      Lymphedema      Macular degeneration (senile) of retina      Memory loss      Mood change      Osteoporosis, unspecified      PSEUDOBULBAR  AFFECT      Recurrent falls      Skin cancer      Tremor      Urinary incontinence, urge      Urinary urgency      Wears glasses        Past Surgical History  Past Surgical History:   Procedure Laterality Date     CHOLECYSTECTOMY       Excision of basal cell carcinoma from RUE  Sep 2015    right shoulder     EXTRACAPSULAR CATARACT EXTRATION WITH INTRAOCULAR LENS IMPLANT       EXTRACAPSULAR CATARACT EXTRATION WITH INTRAOCULAR LENS IMPLANT       HYSTERECTOMY       REPLACE DEEP BRAIN STIMULATION GENERATOR / BATTERY BILATERAL Bilateral 11/8/2016    Procedure: REPLACE DEEP BRAIN STIMULATION GENERATOR / BATTERY BILATERAL;  Surgeon: Bentley Martinez MD;  Location: [facility] OR     REPLACE PULSE GENERATOR SUBCUTANEOUS BILATERAL  29 Jul 2008    Replaced both soletra     REPLACE PULSE GENERATOR SUBCUTANEOUS BILATERAL  7/2/2013    Procedure: REPLACE PULSE GENERATOR SUBCUTANEOUS BILATERAL;  Bilateral Deep Brain Stimulator Battery Exchange ;  Surgeon: Bentley Martinez MD;  Location: [facility] OR     Right DBS Lead removal  jan 2003    infected right lead removed; was in a nursing home on antiobiotics     STEREOTACTIC INSERTION DEEP BRAIN STIMULATION  12 May 2003    placed lead in may after removal of right thalamic  DBS lead due to infection feb 17 2003     STEREOTACTIC INSERTION DEEP BRAIN STIMULATION  9 jun 2003    revision of right DBS electrode     STEREOTACTIC INSERTION DEEP BRAIN STIMULATION  oct 2002    left thalamic DBS lead placed     STEREOTACTIC INSERTION DEEP BRAIN STIMULATION  oct 2002    right thalamic DBS lead placed       Hx of Blood transfusions/reactions: denies     Hx of abnormal bleeding or anti-platelet use: ASA 81 mg holding since 12/21/20    Menstrual history: No LMP recorded. Patient has had a hysterectomy.:     Steroid use in the last year: none    Personal or FH with difficulty with Anesthesia:  denies    Prior to Admission Medications  Current Outpatient Medications   Medication Sig Dispense Refill      carboxymethylcellulose (REFRESH) 1 % ophthalmic solution Place 1 drop into both eyes 2 times daily as needed 1 Bottle      [START ON 1/20/2021] Cholecalciferol (VITAMIN D3) 25 MCG (1000 UT) CAPS TAKE 1 CAPSULE BY MOUTH DAILY (Patient taking differently: every morning TAKE 1 CAPSULE BY MOUTH DAILY) 90 capsule 3     escitalopram (LEXAPRO) 10 MG tablet Take 10 mg by mouth every morning        furosemide (LASIX) 40 MG tablet every morning 40mg tab by mouth daily  1     levothyroxine (SYNTHROID/LEVOTHROID) 50 MCG tablet Take 50 mcg by mouth every morning        Multiple Vitamins-Minerals (OCUVITE PRESERVISION PO) Take 1 tablet by mouth every evening        pravastatin (PRAVACHOL) 20 MG tablet Take 20 mg by mouth every evening  90 tablet 3     Probiotic Product (PROBIOTIC-10 PO) Take by mouth every morning        UNABLE TO FIND MEDICATION NAME:Eye Health Vitamin       aspirin 81 MG tablet Take 1 tablet (81 mg) by mouth daily (Patient taking differently: Take 81 mg by mouth ) 90 tablet 0       Allergies  Allergies   Allergen Reactions     Adhesive Tape      Latex      Vioxx      Pt. Is unaware of any allergy to this med.       Social History  Social History     Socioeconomic History     Marital status:      Spouse name: Not on file     Number of children: Not on file     Years of education: Not on file     Highest education level: Not on file   Occupational History     Not on file   Social Needs     Financial resource strain: Not on file     Food insecurity     Worry: Not on file     Inability: Not on file     Transportation needs     Medical: Not on file     Non-medical: Not on file   Tobacco Use     Smoking status: Never Smoker     Smokeless tobacco: Never Used   Substance and Sexual Activity     Alcohol use: No     Drug use: No     Sexual activity: Not on file   Lifestyle     Physical activity     Days per week: Not on file     Minutes per session: Not on file     Stress: Not on file   Relationships     Social  connections     Talks on phone: Not on file     Gets together: Not on file     Attends Sabianist service: Not on file     Active member of club or organization: Not on file     Attends meetings of clubs or organizations: Not on file     Relationship status: Not on file     Intimate partner violence     Fear of current or ex partner: Not on file     Emotionally abused: Not on file     Physically abused: Not on file     Forced sexual activity: Not on file   Other Topics Concern     Parent/sibling w/ CABG, MI or angioplasty before 65F 55M? Not Asked   Social History Narrative    lives in [place]. . Daughter Mary Ann Mijares.       Family History  Family History   Problem Relation Age of Onset     Diabetes Father      Parkinsonism Mother      Deep Vein Thrombosis Sister      Parkinsonism Sister      Neurologic Disorder Brother         tremor     Neurologic Disorder Brother         tremor     Cancer Brother         gallbladder     Neurologic Disorder Brother         tremor     Cancer Brother         throat     Connective Tissue Disorder Sister         tremor     Parkinsonism Other         niece     Parkinsonism Other         niece       Review of Systems  ROS/MED HX    ENT/Pulmonary:     (+)other ENT- hearing loss, , . .   (-) tobacco use   Neurologic: Comment: Essential tremor - s/p DBS for battery change      Cardiovascular:     (+) Dyslipidemia, hypertension-range: 150-160 range, ---. : . . . :. dysrhythmias 1st Deg Heart Block, . Previous cardiac testing date:results:date: results:ECG reviewed date:9/10/19 results:SINUS BRADYCARDIA WITH FIRST DEGREE AV BLOCK  MINIMAL VOLTAGE CRITERIA FOR LVH, CONSIDER NORMAL VARIANT   date: results:         (-) CAD, CHF and syncope   METS/Exercise Tolerance:  3 - Able to walk 1-2 blocks without stopping   Hematologic:  - neg hematologic  ROS      (-) history of blood clots, anemia and History of Transfusion   Musculoskeletal:   (+) arthritis,  other musculoskeletal-  osteoporosis       GI/Hepatic:     (+) GERD Other, Other GI/Hepatic constipation       Renal/Genitourinary:     (+) Other Renal/ Genitourinary, urinary incontinence       Endo:     (+) thyroid problem hypothyroidism, Obesity, .      Psychiatric:     (+) psychiatric history anxiety      Infectious Disease:  - neg infectious disease ROS       Malignancy:      - no malignancy   Other:    (+) no H/O Chronic Pain,no other significant disability        The complete review of systems is negative other than noted in the HPI or here.                         0 lbs 0 oz  Data Unavailable   There is no height or weight on file to calculate BMI.       Physical Exam  Constitutional: Awake, alert, cooperative, no apparent distress, and appears stated age.  Eyes: Pupils equal, glasses  HENT: Normocephalic, hard of hearing   Respiratory: Non labored breathing  Neurologic: Awake, alert, oriented to name, place and time.   Neuropsychiatric: Calm, cooperative. Normal affect.     Labs: (personally reviewed)  To be complete    EK/10/19  SINUS BRADYCARDIA WITH FIRST DEGREE AV BLOCK  MINIMAL VOLTAGE CRITERIA FOR LVH, CONSIDER NORMAL VARIANT    Zio holter 2019  CONCLUSIONS:  1. Basic rhythm is sinus with normal heartrate trends. No significant  bradycardia or pauses noted.  2. Moderate burden of SVT as above.  3. Rare PACs and PVCs.  4. Consider EP referral for further evaluation of SVT if symptomatic.   Jaylen Mosquera MD  Cardiologist, Cardiac Electrophysiologist  [facility]  Electronically Signed On 2019 17:17:10 CDT by Jaylen Mosquera MD      The patient's records and results personally reviewed by this provider.     Outside records reviewed from: care everywhere     ASSESSMENT and PLAN  Jennifer is an 89 year old woman who is scheduled for Replacement of deep brain stimulator generator/battery over bilateral chest wall on 20 by Dr. Collins in treatment of End of battery life of deep brain  stimulator.  PAC referral for risk assessment and optimization for anesthesia with comorbid conditions of HTN, HLD, 1st degree AVB, hypothyroidism, obesity, GERD, constipation, urinary incontinence, osteoarthritis, osteoporosis, anxiety:    Pre-operative considerations:  1.  Cardiac:  Functional status- METS 3, the patient can walk 1 block to her mailbox and back and go up 1 flight of stairs.  Low risk surgery with 0.4% (RCRI #) risk of major adverse cardiac event. She denies any cardiac symptoms.   ~ HTN - hold lasix  ~ HLD - continue pravachol. Holding ASA 81 mg    ~ 1st degree AVB, seen on recent EKG on 9/10/19. She also did a zio patch in 7/2019 without significant arrhythmia. No further testing for low risk procedure.     2.  Pulm:  Airway feasible.  YESICA risk: Intermediate (age, HTN, BMI)    3. ENT: Hearing loss. The patient also has a metal plate in her right jaw secondary to infection. She has all of her own teeth.      4. Neuro: Essential tremor - s/p DBS with multiple previous generator replacement - procedure as above.     5. Endo: Obesity, BMI 37 - consideration for safe lifting techniques.   ~ hypothyroidism - continue levothyroxine.     6. GI:  Risk of PONV score = 3.  If > 2, anti-emetic intervention recommended.  ~ GERD/ constipation - continue probiotic    7. : Urinary incontinence    8. Psych: Anxiety - continue lexapro    9. Musculoskeletal: osteoarthritis/osteoporosis - consideration for careful positioning to minimize discomfort. The patient uses a walker. Fall precautions as needed. Hold Aleve 48 hours. Continue Boniva.   ~ the patient has a sound on her leg that she is packing with Epifix and wrapping. She is off antibiotics and reports that it is almost healed.     VTE risk: 1.8% (age, sister with history of DVT)    **Please refer to the physical examination documented by the anesthesiologist in the anesthesia record on the day of surgery**      Patient is optimized and is acceptable  candidate for the proposed procedure.  No further diagnostic evaluation is needed. The patient's surgical team placed lab orders for the patient and her daughter was going to have those done by her PCP. Per the daughter her PCP cannot do them. She is having her COVID test at [facility] in [place] so will get her daughter their lab number to get lab appointment set up.         Video-Visit Details    Type of service:  Video Visit    Patient verbally consented to video service today: YES      Video Start Time: 1:58  Video End Time (time video stopped): 2:18    Originating Location (pt. Location): Home    Distant Location (provider location):  [facility] PREOPERATIVE ASSESSMENT [facility]     Mode of Communication:  Video Conference via [facility]        Pamela Finn PA-C  Preoperative Assessment Center  [facility]  Clinic and Surgery Center  Phone: [phone]  Fax: [phone]

## 2020-12-23 NOTE — ANESTHESIA PREPROCEDURE EVALUATION
"Anesthesia Pre-Procedure Evaluation    Patient: Jennifer Ferrera   MRN:     [id number] Gender:   female   Age:    89 year old :      1931        Preoperative Diagnosis: End of battery life of deep brain stimulator [Z45.42]  Status post deep brain stimulator placement [Z96.89]  Tremor [R25.1]  Essential tremor [G25.0]   Procedure(s):  Replacement of deep brain stimulator generator/battery over bilateral chest wall     LABS:  CBC:   Lab Results   Component Value Date    HGB 13.4 2008     BMP:   Lab Results   Component Value Date     2008    POTASSIUM 4.4 2016    POTASSIUM 4.4 2013    CHLORIDE 106 2008    CO2 24 2008    BUN 12 2008    CR 0.68 2008    GLC 83 2008     COAGS:   Lab Results   Component Value Date    PTT 32 2016    INR 1.08 2016     POC: No results found for: BGM, HCG, HCGS  OTHER:   Lab Results   Component Value Date    WERNER 8.3 (L) 2008        Preop Vitals    BP Readings from Last 3 Encounters:   10/22/19 (!) 186/62   10/25/18 181/67   17 186/63    Pulse Readings from Last 3 Encounters:   10/22/19 62   10/25/18 57   17 59      Resp Readings from Last 3 Encounters:   17 20   16 16   14 12    SpO2 Readings from Last 3 Encounters:   10/22/19 98%   10/25/18 98%   17 96%      Temp Readings from Last 1 Encounters:   16 97.6  F (36.4  C)    Ht Readings from Last 1 Encounters:   10/25/18 1.6 m (5' 3\")      Wt Readings from Last 1 Encounters:   17 95.4 kg (210 lb 6.4 oz)    Estimated body mass index is 37.27 kg/m  as calculated from the following:    Height as of 10/25/18: 1.6 m (5' 3\").    Weight as of 17: 95.4 kg (210 lb 6.4 oz).     LDA:  Peripheral IV 13 Left;Posterior;Distal Lower forearm (Active)   Number of days: 2731       Airway - Adult/Peds mask (Active)   Number of days: 2731        Past Medical History:   Diagnosis Date     1st degree AV block 10/20/2015    ECG " shows mild sinus bradycardia with 1st degree heart block and min voltage criteria for LVH; borderline ECG     Anxiety      Dysarthria      Facial numbness      Hearing loss      Hypercholesterolemia      Hypertension      Low back pain      Lymphedema      Macular degeneration (senile) of retina      Memory loss      Mood change      Osteoporosis, unspecified      PSEUDOBULBAR AFFECT      Recurrent falls      Skin cancer      Tremor      Urinary incontinence, urge      Urinary urgency      Wears glasses       Past Surgical History:   Procedure Laterality Date     CHOLECYSTECTOMY       Excision of basal cell carcinoma from RUE  Sep 2015    right shoulder     EXTRACAPSULAR CATARACT EXTRATION WITH INTRAOCULAR LENS IMPLANT       EXTRACAPSULAR CATARACT EXTRATION WITH INTRAOCULAR LENS IMPLANT       HYSTERECTOMY       REPLACE DEEP BRAIN STIMULATION GENERATOR / BATTERY BILATERAL Bilateral 11/8/2016    Procedure: REPLACE DEEP BRAIN STIMULATION GENERATOR / BATTERY BILATERAL;  Surgeon: Bentley Martinez MD;  Location: [facility] OR     REPLACE PULSE GENERATOR SUBCUTANEOUS BILATERAL  29 Jul 2008    Replaced both soletra     REPLACE PULSE GENERATOR SUBCUTANEOUS BILATERAL  7/2/2013    Procedure: REPLACE PULSE GENERATOR SUBCUTANEOUS BILATERAL;  Bilateral Deep Brain Stimulator Battery Exchange ;  Surgeon: Bentley Martinez MD;  Location: [facility] OR     Right DBS Lead removal  jan 2003    infected right lead removed; was in a nursing home on antiobiotics     STEREOTACTIC INSERTION DEEP BRAIN STIMULATION  12 May 2003    placed lead in may after removal of right thalamic  DBS lead due to infection feb 17 2003     STEREOTACTIC INSERTION DEEP BRAIN STIMULATION  9 jun 2003    revision of right DBS electrode     STEREOTACTIC INSERTION DEEP BRAIN STIMULATION  oct 2002    left thalamic DBS lead placed     STEREOTACTIC INSERTION DEEP BRAIN STIMULATION  oct 2002    right thalamic DBS lead placed      Allergies   Allergen Reactions     Adhesive  Tape      Latex      Vioxx      Pt. Is unaware of any allergy to this med.        Anesthesia Evaluation     . Pt has had prior anesthetic. Type: MAC and General    No history of anesthetic complications          ROS/MED HX    ENT/Pulmonary:     (+)other ENT- hearing loss, , . .   (-) tobacco use   Neurologic: Comment: Essential tremor - s/p DBS for battery change      Cardiovascular:     (+) Dyslipidemia, hypertension-range: 150-160 range, ---. : . . . :. dysrhythmias 1st Deg Heart Block, . Previous cardiac testing date:results:date: results:ECG reviewed date:9/10/19 results:SINUS BRADYCARDIA WITH FIRST DEGREE AV BLOCK  MINIMAL VOLTAGE CRITERIA FOR LVH, CONSIDER NORMAL VARIANT   date: results:         (-) CAD, CHF and syncope   METS/Exercise Tolerance:  3 - Able to walk 1-2 blocks without stopping   Hematologic:  - neg hematologic  ROS      (-) history of blood clots, anemia and History of Transfusion   Musculoskeletal:   (+) arthritis,  other musculoskeletal- osteoporosis       GI/Hepatic:     (+) GERD Other, Other GI/Hepatic constipation       Renal/Genitourinary:     (+) Other Renal/ Genitourinary, urinary incontinence       Endo:     (+) thyroid problem hypothyroidism, Obesity, .      Psychiatric:     (+) psychiatric history anxiety      Infectious Disease:  - neg infectious disease ROS       Malignancy:      - no malignancy   Other:    (+) no H/O Chronic Pain,no other significant disability                        PHYSICAL EXAM:   Mental Status/Neuro: A/A/O   Airway: Facies: Feasible  Mallampati: I  Mouth/Opening: Full  TM distance: > 6 cm  Neck ROM: Full   Respiratory: Auscultation: CTAB     Resp. Rate: Normal     Resp. Effort: Normal      CV: Rhythm: Regular  Rate: Age appropriate  Heart: Normal Sounds  Edema: None   Comments:      Dental: Normal Dentition                Assessment:   ASA SCORE: 3       NPO Status: NPO Appropriate     Plan:   Anes. Type:  MAC   Pre-Medication: None   Induction:  IV (Standard)    Airway: Native Airway   Access/Monitoring: PIV   Maintenance: Propofol Sedation     Postop Plan:   Postop Pain: None  Postop Sedation/Airway: Not planned     PONV Management: Adult Risk Factors: Female   Prevention: Ondansetron, Propofol     CONSENT: Direct conversation   Plan and risks discussed with: Patient   Blood Products: Consent Deferred (Minimal Blood Loss)                PAC Discussion and Assessment    ASA Classification: 3  Case is suitable for: [place]  Anesthetic techniques and relevant risks discussed: MAC with GA as backup  Invasive monitoring and risk discussed:   Types:   Possibility and Risk of blood transfusion discussed:   NPO instructions given:   Additional anesthetic preparation and risks discussed:   Needs early admission to pre-op area:   Other:     PAC Resident/NP Anesthesia Assessment:  Jennifer is an 89 year old woman who is scheduled for Replacement of deep brain stimulator generator/battery over bilateral chest wall on 12/29/20 by Dr. Collins in treatment of End of battery life of deep brain stimulator.  PAC referral for risk assessment and optimization for anesthesia with comorbid conditions of HTN, HLD, 1st degree AVB, hypothyroidism, obesity, GERD, constipation, urinary incontinence, osteoarthritis, osteoporosis, anxiety:    Pre-operative considerations:  1.  Cardiac:  Functional status- METS 3, the patient can walk 1 block to her mailbox and back and go up 1 flight of stairs.  Low risk surgery with 0.4% (RCRI #) risk of major adverse cardiac event. She denies any cardiac symptoms.   ~ HTN - hold lasix  ~ HLD - continue pravachol. Holding ASA 81 mg    ~ 1st degree AVB, seen on recent EKG on 9/10/19. She also did a zio patch in 7/2019 without significant arrhythmia. No further testing for low risk procedure.     2.  Pulm:  Airway feasible.  YESICA risk: Intermediate (age, HTN, BMI)    3. ENT: Hearing loss. The patient also has a metal plate in her right jaw secondary to infection. She has  all of her own teeth.      4. Neuro: Essential tremor - s/p DBS with multiple previous generator replacement - procedure as above.     5. Endo: Obesity, BMI 37 - consideration for safe lifting techniques.   ~ hypothyroidism - continue levothyroxine.     6. GI:  Risk of PONV score = 3.  If > 2, anti-emetic intervention recommended.  ~ GERD/ constipation - continue probiotic    7. : Urinary incontinence    8. Psych: Anxiety - continue lexapro    9. Musculoskeletal: osteoarthritis/osteoporosis - consideration for careful positioning to minimize discomfort. The patient uses a walker. Fall precautions as needed. Hold Aleve 48 hours. Continue Boniva.   ~ the patient has a sound on her leg that she is packing with Epifix and wrapping. She is off antibiotics and reports that it is almost healed.     VTE risk: 1.8% (age, sister with history of DVT)    **Please refer to the physical examination documented by the anesthesiologist in the anesthesia record on the day of surgery**      Patient is optimized and is acceptable candidate for the proposed procedure.  No further diagnostic evaluation is needed. The patient's surgical team placed lab orders for the patient and her daughter was going to have those done by her PCP. Per the daughter her PCP cannot do them. She is having her COVID test at [facility] in [place] so will get her daughter their lab number to get lab appointment set up.       For further details of assessment, testing, and physical exam please see H and P completed on same date.    Pamela Finn PA-C            Mid-Level Provider/Resident: Pamela Finn PA-C  Date: 12/23/20  Time:     Attending Anesthesiologist Anesthesia Assessment:        Anesthesiologist:   Date:   Time:   Pass/Fail:   Disposition:     PAC Pharmacist Assessment:        Pharmacist:   Date:   Time:    Pamela Finn PA-C

## 2020-12-23 NOTE — PROGRESS NOTES
"Jennifer Ferrera is a 89 year old female who is being evaluated via a billable video visit.      The patient has been notified of following:     \"This video visit will be conducted via a call between you and your physician/provider. We have found that certain health care needs can be provided without the need for an in-person physical exam.  This service lets us provide the care you need with a video conversation.  If a prescription is necessary we can send it directly to your pharmacy.  If lab work is needed we can place an order for that and you can then stop by our lab to have the test done at a later time.    Video visits are billed at different rates depending on your insurance coverage.  Please reach out to your insurance provider with any questions.    If during the course of the call the physician/provider feels a video visit is not appropriate, you will not be charged for this service.\"    Patient has given verbal consent for Video visit? Yes  How would you like to obtain your AVS? MyChart  If  Will anyone else be joining your video visit? Daughter Mary Ann Juares LPN          "

## 2020-12-24 ENCOUNTER — AMBULATORY - HEALTHEAST (OUTPATIENT)
Dept: FAMILY MEDICINE | Facility: CLINIC | Age: 85
End: 2020-12-24

## 2020-12-24 DIAGNOSIS — Z11.59 ENCOUNTER FOR SCREENING FOR OTHER VIRAL DISEASES: ICD-10-CM

## 2020-12-26 ENCOUNTER — AMBULATORY - HEALTHEAST (OUTPATIENT)
Dept: LAB | Facility: HOSPITAL | Age: 85
End: 2020-12-26

## 2020-12-26 DIAGNOSIS — Z01.818 PREOP EXAMINATION: ICD-10-CM

## 2020-12-26 DIAGNOSIS — Z11.59 SCREENING EXAMINATION FOR ARTHROPOD-BORNE VIRAL DISEASE: ICD-10-CM

## 2020-12-26 LAB
ANION GAP SERPL CALCULATED.3IONS-SCNC: 7 MMOL/L (ref 5–18)
BUN SERPL-MCNC: 13 MG/DL (ref 8–28)
CALCIUM SERPL-MCNC: 8.4 MG/DL (ref 8.5–10.5)
CHLORIDE BLD-SCNC: 105 MMOL/L (ref 98–107)
CO2 SERPL-SCNC: 30 MMOL/L (ref 22–31)
CREAT SERPL-MCNC: 0.81 MG/DL (ref 0.6–1.1)
ERYTHROCYTE [DISTWIDTH] IN BLOOD BY AUTOMATED COUNT: 12.5 % (ref 11–14.5)
GFR SERPL CREATININE-BSD FRML MDRD: >60 ML/MIN/1.73M2
GLUCOSE BLD-MCNC: 90 MG/DL (ref 70–125)
HCT VFR BLD AUTO: 41 % (ref 35–47)
HGB BLD-MCNC: 13.7 G/DL (ref 12–16)
MCH RBC QN AUTO: 34 PG (ref 27–34)
MCHC RBC AUTO-ENTMCNC: 33.4 G/DL (ref 32–36)
MCV RBC AUTO: 102 FL (ref 80–100)
PLATELET # BLD AUTO: 186 THOU/UL (ref 140–440)
PMV BLD AUTO: 10.7 FL (ref 8.5–12.5)
POTASSIUM BLD-SCNC: 3.8 MMOL/L (ref 3.5–5)
RBC # BLD AUTO: 4.03 MILL/UL (ref 3.8–5.4)
SODIUM SERPL-SCNC: 142 MMOL/L (ref 136–145)
WBC: 4.2 THOU/UL (ref 4–11)

## 2020-12-27 ENCOUNTER — COMMUNICATION - HEALTHEAST (OUTPATIENT)
Dept: SCHEDULING | Facility: CLINIC | Age: 85
End: 2020-12-27

## 2020-12-29 ENCOUNTER — HOSPITAL ENCOUNTER (OUTPATIENT)
Facility: CLINIC | Age: 85
Discharge: HOME | End: 2020-12-29
Attending: NEUROLOGICAL SURGERY | Admitting: NEUROLOGICAL SURGERY
Payer: COMMERCIAL

## 2020-12-29 ENCOUNTER — ANESTHESIA (OUTPATIENT)
Dept: SURGERY | Facility: CLINIC | Age: 85
End: 2020-12-29
Payer: COMMERCIAL

## 2020-12-29 VITALS
SYSTOLIC BLOOD PRESSURE: 150 MMHG | WEIGHT: 190.48 LBS | RESPIRATION RATE: 20 BRPM | DIASTOLIC BLOOD PRESSURE: 65 MMHG | HEART RATE: 80 BPM | BODY MASS INDEX: 33.75 KG/M2 | HEIGHT: 63 IN | OXYGEN SATURATION: 100 % | TEMPERATURE: 98.9 F

## 2020-12-29 DIAGNOSIS — R25.1 TREMOR: Primary | ICD-10-CM

## 2020-12-29 DIAGNOSIS — G25.0 ESSENTIAL TREMOR: ICD-10-CM

## 2020-12-29 DIAGNOSIS — Z45.42 END OF BATTERY LIFE OF DEEP BRAIN STIMULATOR: ICD-10-CM

## 2020-12-29 DIAGNOSIS — Z96.89 STATUS POST DEEP BRAIN STIMULATOR PLACEMENT: ICD-10-CM

## 2020-12-29 LAB — GLUCOSE BLDC GLUCOMTR-MCNC: 90 MG/DL (ref 70–99)

## 2020-12-29 PROCEDURE — 360N000022 HC SURGERY LEVEL 3 1ST 30 MIN - UMMC: Performed by: NEUROLOGICAL SURGERY

## 2020-12-29 PROCEDURE — 250N000011 HC RX IP 250 OP 636: Performed by: NEUROLOGICAL SURGERY

## 2020-12-29 PROCEDURE — 360N000023 HC SURGERY LEVEL 3 EA 15 ADDTL MIN UMMC: Performed by: NEUROLOGICAL SURGERY

## 2020-12-29 PROCEDURE — 61885 INSRT/REDO NEUROSTIM 1 ARRAY: CPT | Mod: 50 | Performed by: NEUROLOGICAL SURGERY

## 2020-12-29 PROCEDURE — 761N000007 HC RECOVERY PHASE 2 EACH 15 MINS: Performed by: NEUROLOGICAL SURGERY

## 2020-12-29 PROCEDURE — 250N000009 HC RX 250: Performed by: NURSE ANESTHETIST, CERTIFIED REGISTERED

## 2020-12-29 PROCEDURE — 999N000139 HC STATISTIC PRE-PROCEDURE ASSESSMENT II: Performed by: NEUROLOGICAL SURGERY

## 2020-12-29 PROCEDURE — 999N001017 HC STATISTIC GLUCOSE BY METER IP

## 2020-12-29 PROCEDURE — 370N000002 HC ANESTHESIA TECHNICAL FEE, EACH ADDTL 15 MIN: Performed by: NEUROLOGICAL SURGERY

## 2020-12-29 PROCEDURE — C1767 GENERATOR, NEURO NON-RECHARG: HCPCS | Performed by: NEUROLOGICAL SURGERY

## 2020-12-29 PROCEDURE — 370N000001 HC ANESTHESIA TECHNICAL FEE, 1ST 30 MIN: Performed by: NEUROLOGICAL SURGERY

## 2020-12-29 PROCEDURE — 250N000011 HC RX IP 250 OP 636: Performed by: NURSE ANESTHETIST, CERTIFIED REGISTERED

## 2020-12-29 PROCEDURE — 250N000009 HC RX 250: Performed by: NEUROLOGICAL SURGERY

## 2020-12-29 PROCEDURE — 272N000001 HC OR GENERAL SUPPLY STERILE: Performed by: NEUROLOGICAL SURGERY

## 2020-12-29 PROCEDURE — 258N000003 HC RX IP 258 OP 636: Performed by: NURSE ANESTHETIST, CERTIFIED REGISTERED

## 2020-12-29 DEVICE — NEUROSTIMULATOR MEDT ACTIVA SC 37602: Type: IMPLANTABLE DEVICE | Site: CHEST  WALL | Status: FUNCTIONAL

## 2020-12-29 RX ORDER — NALOXONE HYDROCHLORIDE 0.4 MG/ML
0.2 INJECTION, SOLUTION INTRAMUSCULAR; INTRAVENOUS; SUBCUTANEOUS
Status: DISCONTINUED | OUTPATIENT
Start: 2020-12-29 | End: 2020-12-29 | Stop reason: HOSPADM

## 2020-12-29 RX ORDER — EPHEDRINE SULFATE 50 MG/ML
INJECTION, SOLUTION INTRAMUSCULAR; INTRAVENOUS; SUBCUTANEOUS PRN
Status: DISCONTINUED | OUTPATIENT
Start: 2020-12-29 | End: 2020-12-29

## 2020-12-29 RX ORDER — ALBUTEROL SULFATE 0.83 MG/ML
2.5 SOLUTION RESPIRATORY (INHALATION) EVERY 4 HOURS PRN
Status: DISCONTINUED | OUTPATIENT
Start: 2020-12-29 | End: 2020-12-29 | Stop reason: HOSPADM

## 2020-12-29 RX ORDER — ONDANSETRON 4 MG/1
4 TABLET, ORALLY DISINTEGRATING ORAL EVERY 30 MIN PRN
Status: DISCONTINUED | OUTPATIENT
Start: 2020-12-29 | End: 2020-12-29 | Stop reason: HOSPADM

## 2020-12-29 RX ORDER — NALOXONE HYDROCHLORIDE 0.4 MG/ML
0.4 INJECTION, SOLUTION INTRAMUSCULAR; INTRAVENOUS; SUBCUTANEOUS
Status: DISCONTINUED | OUTPATIENT
Start: 2020-12-29 | End: 2020-12-29 | Stop reason: HOSPADM

## 2020-12-29 RX ORDER — SENNA AND DOCUSATE SODIUM 50; 8.6 MG/1; MG/1
1 TABLET, FILM COATED ORAL AT BEDTIME
Qty: 30 TABLET | Refills: 3 | Status: SHIPPED | OUTPATIENT
Start: 2020-12-29 | End: 2023-07-13

## 2020-12-29 RX ORDER — FENTANYL CITRATE 50 UG/ML
INJECTION, SOLUTION INTRAMUSCULAR; INTRAVENOUS PRN
Status: DISCONTINUED | OUTPATIENT
Start: 2020-12-29 | End: 2020-12-29

## 2020-12-29 RX ORDER — SODIUM CHLORIDE, SODIUM LACTATE, POTASSIUM CHLORIDE, CALCIUM CHLORIDE 600; 310; 30; 20 MG/100ML; MG/100ML; MG/100ML; MG/100ML
INJECTION, SOLUTION INTRAVENOUS CONTINUOUS PRN
Status: DISCONTINUED | OUTPATIENT
Start: 2020-12-29 | End: 2020-12-29

## 2020-12-29 RX ORDER — METOPROLOL TARTRATE 1 MG/ML
1-2 INJECTION, SOLUTION INTRAVENOUS EVERY 5 MIN PRN
Status: DISCONTINUED | OUTPATIENT
Start: 2020-12-29 | End: 2020-12-29 | Stop reason: HOSPADM

## 2020-12-29 RX ORDER — ONDANSETRON 2 MG/ML
INJECTION INTRAMUSCULAR; INTRAVENOUS PRN
Status: DISCONTINUED | OUTPATIENT
Start: 2020-12-29 | End: 2020-12-29

## 2020-12-29 RX ORDER — PROPOFOL 10 MG/ML
INJECTION, EMULSION INTRAVENOUS CONTINUOUS PRN
Status: DISCONTINUED | OUTPATIENT
Start: 2020-12-29 | End: 2020-12-29

## 2020-12-29 RX ORDER — LIDOCAINE HYDROCHLORIDE 20 MG/ML
INJECTION, SOLUTION INFILTRATION; PERINEURAL PRN
Status: DISCONTINUED | OUTPATIENT
Start: 2020-12-29 | End: 2020-12-29

## 2020-12-29 RX ORDER — OXYCODONE HYDROCHLORIDE 5 MG/1
5 TABLET ORAL EVERY 6 HOURS PRN
Qty: 12 TABLET | Refills: 0 | Status: SHIPPED | OUTPATIENT
Start: 2020-12-29 | End: 2021-01-01

## 2020-12-29 RX ORDER — HYDROMORPHONE HYDROCHLORIDE 1 MG/ML
.3-.5 INJECTION, SOLUTION INTRAMUSCULAR; INTRAVENOUS; SUBCUTANEOUS EVERY 10 MIN PRN
Status: DISCONTINUED | OUTPATIENT
Start: 2020-12-29 | End: 2020-12-29 | Stop reason: HOSPADM

## 2020-12-29 RX ORDER — FENTANYL CITRATE 50 UG/ML
25-50 INJECTION, SOLUTION INTRAMUSCULAR; INTRAVENOUS EVERY 5 MIN PRN
Status: DISCONTINUED | OUTPATIENT
Start: 2020-12-29 | End: 2020-12-29 | Stop reason: HOSPADM

## 2020-12-29 RX ORDER — HYDRALAZINE HYDROCHLORIDE 20 MG/ML
2.5-5 INJECTION INTRAMUSCULAR; INTRAVENOUS EVERY 10 MIN PRN
Status: DISCONTINUED | OUTPATIENT
Start: 2020-12-29 | End: 2020-12-29 | Stop reason: HOSPADM

## 2020-12-29 RX ORDER — CEFAZOLIN SODIUM 2 G/100ML
2 INJECTION, SOLUTION INTRAVENOUS
Status: COMPLETED | OUTPATIENT
Start: 2020-12-29 | End: 2020-12-29

## 2020-12-29 RX ORDER — FENTANYL CITRATE 50 UG/ML
25-50 INJECTION, SOLUTION INTRAMUSCULAR; INTRAVENOUS
Status: DISCONTINUED | OUTPATIENT
Start: 2020-12-29 | End: 2020-12-29 | Stop reason: HOSPADM

## 2020-12-29 RX ORDER — CEPHALEXIN 500 MG/1
500 CAPSULE ORAL 3 TIMES DAILY
Qty: 21 CAPSULE | Refills: 0 | Status: SHIPPED | OUTPATIENT
Start: 2020-12-29 | End: 2021-01-05

## 2020-12-29 RX ORDER — SODIUM CHLORIDE, SODIUM LACTATE, POTASSIUM CHLORIDE, CALCIUM CHLORIDE 600; 310; 30; 20 MG/100ML; MG/100ML; MG/100ML; MG/100ML
INJECTION, SOLUTION INTRAVENOUS CONTINUOUS
Status: DISCONTINUED | OUTPATIENT
Start: 2020-12-29 | End: 2020-12-29 | Stop reason: HOSPADM

## 2020-12-29 RX ORDER — CEFAZOLIN SODIUM 1 G/3ML
1 INJECTION, POWDER, FOR SOLUTION INTRAMUSCULAR; INTRAVENOUS SEE ADMIN INSTRUCTIONS
Status: DISCONTINUED | OUTPATIENT
Start: 2020-12-29 | End: 2020-12-29 | Stop reason: HOSPADM

## 2020-12-29 RX ORDER — MEPERIDINE HYDROCHLORIDE 25 MG/ML
12.5 INJECTION INTRAMUSCULAR; INTRAVENOUS; SUBCUTANEOUS
Status: DISCONTINUED | OUTPATIENT
Start: 2020-12-29 | End: 2020-12-29 | Stop reason: HOSPADM

## 2020-12-29 RX ORDER — OXYCODONE HYDROCHLORIDE 5 MG/1
5 TABLET ORAL EVERY 4 HOURS PRN
Status: DISCONTINUED | OUTPATIENT
Start: 2020-12-29 | End: 2020-12-29 | Stop reason: HOSPADM

## 2020-12-29 RX ORDER — ONDANSETRON 2 MG/ML
4 INJECTION INTRAMUSCULAR; INTRAVENOUS EVERY 30 MIN PRN
Status: DISCONTINUED | OUTPATIENT
Start: 2020-12-29 | End: 2020-12-29 | Stop reason: HOSPADM

## 2020-12-29 RX ADMIN — LIDOCAINE HYDROCHLORIDE 60 MG: 20 INJECTION, SOLUTION INFILTRATION; PERINEURAL at 08:08

## 2020-12-29 RX ADMIN — CEFAZOLIN 2 G: 10 INJECTION, POWDER, FOR SOLUTION INTRAVENOUS at 08:10

## 2020-12-29 RX ADMIN — FENTANYL CITRATE 25 MCG: 50 INJECTION, SOLUTION INTRAMUSCULAR; INTRAVENOUS at 08:22

## 2020-12-29 RX ADMIN — PROPOFOL 50 MCG/KG/MIN: 10 INJECTION, EMULSION INTRAVENOUS at 08:08

## 2020-12-29 RX ADMIN — Medication 10 MG: at 08:33

## 2020-12-29 RX ADMIN — SODIUM CHLORIDE, POTASSIUM CHLORIDE, SODIUM LACTATE AND CALCIUM CHLORIDE: 600; 310; 30; 20 INJECTION, SOLUTION INTRAVENOUS at 08:05

## 2020-12-29 RX ADMIN — ONDANSETRON 4 MG: 2 INJECTION INTRAMUSCULAR; INTRAVENOUS at 09:13

## 2020-12-29 RX ADMIN — Medication 5 MG: at 08:30

## 2020-12-29 ASSESSMENT — MIFFLIN-ST. JEOR: SCORE: 1258.13

## 2020-12-29 NOTE — ANESTHESIA CARE TRANSFER NOTE
Patient: Jennifer Ferrera    Procedure(s):  Replacement of deep brain stimulator generator/battery over bilateral chest wall  **Latex Allergy**    Diagnosis: End of battery life of deep brain stimulator [Z45.42]  Status post deep brain stimulator placement [Z96.89]  Tremor [R25.1]  Essential tremor [G25.0]  Diagnosis Additional Information: No value filed.    Anesthesia Type:   MAC     Note:  Airway :Room Air  Patient transferred to:Phase II  Comments: Pt alert, breathing spontaneously on RA. VSS. Report shared with RN.Handoff Report: Identifed the Patient, Identified the Reponsible Provider, Reviewed the pertinent medical history, Discussed the surgical course, Reviewed Intra-OP anesthesia mangement and issues during anesthesia, Set expectations for post-procedure period and Allowed opportunity for questions and acknowledgement of understanding      Vitals: (Last set prior to Anesthesia Care Transfer)    CRNA VITALS  12/29/2020 0859 - 12/29/2020 0936      12/29/2020             NIBP:  145/67    NIBP Mean:  111    SpO2:  96 %                Electronically Signed By: IGNACIO Azar CRNA  December 29, 2020  9:36 AM

## 2020-12-29 NOTE — DISCHARGE INSTRUCTIONS
[facility]  Same-Day Surgery   Adult Discharge Orders & Instructions     For 24 hours after surgery    1. Get plenty of rest.  A responsible adult must stay with you for at least 24 hours after you leave the hospital.   2. Do not drive or use heavy equipment.  If you have weakness or tingling, don't drive or use heavy equipment until this feeling goes away.  3. Do not drink alcohol.  4. Avoid strenuous or risky activities.  Ask for help when climbing stairs.   5. You may feel lightheaded.  IF so, sit for a few minutes before standing.  Have someone help you get up.   6. If you have nausea (feel sick to your stomach): Drink only clear liquids such as apple juice, ginger ale, broth or 7-Up.  Rest may also help.  Be sure to drink enough fluids.  Move to a regular diet as you feel able.  7. You may have a slight fever. Call the doctor if your fever is over 100 F (37.7 C) (taken under the tongue) or lasts longer than 24 hours.  8. You may have a dry mouth, a sore throat, muscle aches or trouble sleeping.  These should go away after 24 hours.  9. Do not make important or legal decisions.   Call your doctor for any of the followin.  Signs of infection (fever, growing tenderness at the surgery site, a large amount of drainage or bleeding, severe pain, foul-smelling drainage, redness, swelling).    2. It has been over 8 to 10 hours since surgery and you are still not able to urinate (pass water).    3.  Headache for over 24 hours.    4.  Numbness, tingling or weakness the day after surgery (if you had spinal anesthesia).  To contact doctor hays, call [phone] [CLINIC] or:        [phone] and ask for the resident on call for neurosurgery (answered 24 hours a day)      Emergency Department:    [facility]: [phone]

## 2020-12-29 NOTE — ANESTHESIA POSTPROCEDURE EVALUATION
Anesthesia POST Procedure Evaluation    Patient: Jennifer Ferrera   MRN:     [id number] Gender:   female   Age:    89 year old :      1931        Preoperative Diagnosis: End of battery life of deep brain stimulator [Z45.42]  Status post deep brain stimulator placement [Z96.89]  Tremor [R25.1]  Essential tremor [G25.0]   Procedure(s):  Replacement of deep brain stimulator generator/battery over bilateral chest wall  **Latex Allergy**   Postop Comments: No value filed.     Anesthesia Type: MAC       Disposition: Outpatient   Postop Pain Control: Uneventful            Sign Out: Well controlled pain   PONV: No   Neuro/Psych: Uneventful            Sign Out: Acceptable/Baseline neuro status   Airway/Respiratory: Uneventful            Sign Out: AIRWAY IN SITU/Resp. Support   CV/Hemodynamics: Uneventful            Sign Out: Acceptable CV status   Other NRE: NONE   DID A NON-ROUTINE EVENT OCCUR? No         Last Anesthesia Record Vitals:  CRNA VITALS  2020 0859 - 2020 0956      2020             NIBP:  145/67    NIBP Mean:  111    SpO2:  96 %          Last PACU Vitals:  Vitals Value Taken Time   /67 20 0932   Temp 37.2  C (99  F) 20 0932   Pulse 80 20 0932   Resp 20 20 0932   SpO2 100 % 20 0932   Temp src Available 20 0930   NIBP     Pulse     SpO2 94 % 20 0930   Resp     Temp     Ht Rate 80 20 0930   Temp 2           Electronically Signed By: Red Benson MD, 2020, 9:56 AM

## 2020-12-29 NOTE — BRIEF OP NOTE
[facility]     Brief Operative Note    Pre-operative diagnosis: End of battery life of deep brain stimulator [Z45.42]  Status post deep brain stimulator placement [Z96.89]  Tremor [R25.1]  Essential tremor [G25.0]  Post-operative diagnosis End of battery life of deep brain stimulator [Z45.42]  Status post deep brain stimulator placement [Z96.89]  Tremor [R25.1]  Essential tremor [G25.0]      Procedure: Procedure(s):  Replacement of deep brain stimulator generator/battery over bilateral chest wall  **Latex Allergy**  Surgeon: Surgeon(s) and Role:     * Noe Collins MD - Primary     * Hieu Blankenship MD - Resident - Assisting  Anesthesia: Combined MAC with Local   Estimated blood loss: 2 mL  Drains: None  Specimens: * No specimens in log *  Findings:   Impedances within normal limits at end of case.   Complications: None.  Implants:   Implant Name Type Inv. Item Serial No.  Lot No. LRB No. Used Action   NEUROSTIMULATOR MEDT ACTIVA SC [id number]  NEUROSTIMULATOR MEDT ACTIVA SC 13573 [id number] [employer]  Left 1 Explanted   NEUROSTIMULATOR MEDT ACTIVA SC 39027 Neurology device NEUROSTIMULATOR MEDT ACTIVA SC 44874 [id number] [employer]  Left 1 Implanted   NEUROSTIMULATOR MEDT ACTIVA SC 83267  NEUROSTIMULATOR MEDT ACTIVA SC 46033 [id number] [employer]  Right 1 Explanted   NEUROSTIMULATOR MEDT ACTIVA SC 14231 Neurology device NEUROSTIMULATOR MEDT ACTIVA SC 94750 [id number] [employer]  Right 1 Implanted

## 2020-12-30 ENCOUNTER — PATIENT OUTREACH (OUTPATIENT)
Dept: NEUROSURGERY | Facility: CLINIC | Age: 85
End: 2020-12-30

## 2020-12-30 NOTE — OP NOTE
PATIENT NAME: RIANNA WALTERS  YOB: 1931  MRN:   [id number]  ACCOUNT:  [id number]      DATE OF PROCEDURE:  12/29/2020    PREOPERATIVE DIAGNOSIS:  1.  Essential tremor  2.  S/p bilateral DBS placement, October of 2002  3.  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  S/p numerous replacement of bilateral DBS generators/batteries over bilateral chest wall, in 7/29/2008, 7/2/2013, and last replacement in 11/8/2016  5.  Depleted deep brain stimulator generators/batteries, model Activa SC, over the left and right chest wall    POSTOPERATIVE DIAGNOSIS:  1.  Essential tremor  2.  S/p bilateral DBS placement, October of 2002  3.  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  S/p numerous replacement of bilateral DBS generators/batteries over bilateral chest wall, in 7/29/2008, 7/2/2013, and last replacement in 11/8/2016  5.  Depleted deep brain stimulator generators/batteries, model Activa SC, over the left and right chest wall    PROCEDURES PERFORMED:  1.  Replacement of deep brain stimulator generators/batteries, model Activa SC, over the left and right chest wall with connection to two electrode arrays.  2.  Revision of the left and right chest wall generator/battery pockets.  3.  Electrical interrogation and analysis of the left and right side deep brain stimulator systems.    ATTENDING SURGEON:  Noe Collins MD, PhD.    ASSISTANT SURGEON:  Hieu Blankenship MD    ANESTHESIA:  Monitored anesthesia care and local anesthetic.    ESTIMATED BLOOD LOSS:  2 mL.    COMPLICATIONS:  None.    FINDINGS:  No sign of infection.  No sign of hardware breakage or damage.  With new generators/batteries connected, all the impedance values were within normal.  No problems were found.    EXPLANTS:  Medtronic Model Activa SC generators/batteries, Model #88700.  Left side - S/N [id number], Right side - S/N [id number].    IMPLANTS:  Medtronic Model Activa SC  generators/batteries, Model #07646.  Left side - S/N [id number], Right side - S/N [id number].    INDICATIONS FOR PROCEDURE:  Ms. Jennifer Ferrera presents with depleted deep brain stimulator generators/batteries over the bilateral chest wall.  She is a 89 year old female with long standing tremor.  She underwent bilateral DBS placement back in October of 2002 with electrodes in the bilateral ventral intermediate nucleus of the thalamus.  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  Since then, she has had bilateral DBS system with numerous generators/batteries being replaced.  She had the replacement surgeries in 7/19/2008, 7/2/2013 and 11/8/2016.  Her last replacement surgery was with Dr. Martinez.  It appears that her generators/batteries last her about four years now.  She is followed by Dr. Neves and she was seen via video visit on 12/11/2020.  She reached out to the neurology team that her generators/batteries were at [facility]AKAR.  Patient states that she is in her usual state of health.  She denies any recent illness but she is being treated for a leg sore which is healing ok.  She has one more treatment.  She was on an antibiotic some time ago for this but she is not on it now.  There is no report of active infection.  She does take a baby aspirin, 81 mg, daily and she took a dose today.  She is not on any other anticoagulants or antiplatelet therapy.  In terms of her device, she has the DBS on all the time.  Per patient's daughter, it is on the highest setting for her at this time.  She is getting more tremors recently.  She denies any exposed hardware anywhere.  She also denies any abnormal shocks or undesirable stimulation effects.  She is also ok with the current locations of the generators/batteries.  She has bilateral Activa SC, model 17646.  We discussed the surgical procedure for replacing the DBS generators/batteries over the bilateral chest wall.  Risks, benefits, alternative  therapies were discussed with the patient, including but not limited to infection and bleeding.  The increased risk for infection was discussed given her leg sore.  However, we did agree that since her devices are at PR[place]HAKAR, the benefit outweigh the risk of infection.  She will be given preoperative and postoperative antibiotics as a normal course.  This surgical procedure will be performed under MAC with local anesthetic.  The thinned part of the wound/pocket may be corrected with mobilization of the tissue under the incision.  The pocket may need to be enlarged to minimize the stress on the closure.  Surgical procedure was discussed in detail.  All questions were answered, and the patient and her daughter expressed understanding.    DESCRIPTION OF PROCEDURE:  Informed consent was obtained from the patient and her left and right chest was marked, specifically the previous incisions.  She was brought to the operating theater and was laid in a supine position.  Her bilateral arms were tucked.  All pressure points were padded appropriately.  Monitored anesthesia care was induced and maintained throughout the entire case.  Her scars and previous incisions over the left and right chest wall were prepped and draped in the usual sterile fashion.  Time out was performed confirming the patient's identity, procedure to be performed, site and side of the surgery and the administration of prophylactic preoperative antibiotic.  Lidocaine 1% with 1:100,000 epinephrine mixed with Marcaine 1/4% plain, 50:50 mix, was injected in the subcutaneous space at the intended incision sites, which are the previously well healed incisions.  Total of 24 mL was used.    We turned our attention to the left side first.  We used a #10 blade scalpel to make the skin incision, going over the previously well healed scar.  We carried our dissection through the dermal layer until we reached the subcutaneous fat and then the generator/battery capsule.   The monopolar cautery was used to dissect the subcutaneous tissue and #10 blade scalpel to open the capsule, taking great care not to damage the hardware.  We gently opened up the fibrous capsule surrounding the generator/battery to expose it.  Care was taken to open the capsule while lifting the capsule itself away from the generator/battery, taking great care not to come in contact with the metal casing or the wire.  The generator/battery was noted to be rotated 90 degrees counterclockwise.  The generator/battery was mobile and we were able to remove it easily, taking care to attend to the location of the wire.  The scar surrounding and anchoring the extension wire was carefully dissected and wire freed.  The extension wire was examined and was found to be without any damage or erosion or defect.  There were no obvious signs of infection.    We then performed blunt and sharp dissection within the pocket after the generator/battery had been removed in order to revise the chest wall pocket for the new generator/battery, so as to reduce the tension on the wound closure.  Therefore, the pocket was generously enlarged.  We also dissected free and undermined the superior aspect of the pocket so that the closure would have less tension after closure and more subcutaneous tissue would be available.  The entire cavity was copiously irrigated with normal saline and meticulous hemostasis was obtained and the pocket was dried.      The old generator/battery, Activa SC, was disconnected from the extension wire and taken off the field.  The connector contacts were cleaned.  The new generator/battery, Medtronic model Activa SC, was connected to the extension wire.  Therefore, one electrode array was connected to generator/battery and secured.  And then the new generator/battery was placed back into the pocket along with the excess extension wire being placed posteriorly and around the periphery of the generator/battery.  The  generator/battery was secured to the new position within the pocket using two 2-0 Ethibond sutures.    We turned our attention to the right side next.  We used a #10 blade scalpel to make the skin incision, going over the previously well healed scar.  We carried our dissection through the dermal layer until we reached the subcutaneous fat and then the generator/battery capsule.  The monopolar cautery was used to dissect the subcutaneous tissue and #10 blade scalpel to open the capsule, taking great care not to damage the hardware.  We gently opened up the fibrous capsule surrounding the generator/battery to expose it.  Care was taken to open the capsule while lifting the capsule itself away from the generator/battery, taking great care not to come in contact with the metal casing or the wire.  The generator/battery was mobile and we were able to remove it easily, taking care to attend to the location of the wire.  The scar surrounding and anchoring the extension wire was carefully dissected and wire freed.  The extension wire was examined and was found to be without any damage or erosion or defect.  There were no obvious signs of infection.    We then performed blunt and sharp dissection within the pocket after the generator/battery had been removed in order to revise the chest wall pocket for the new generator/battery, so as to reduce the tension on the wound closure.  Therefore, the pocket was generously enlarged.  We also dissected free and undermined the superior aspect of the pocket so that the closure would have less tension after closure and more subcutaneous tissue would be available.  The entire cavity was copiously irrigated with normal saline and meticulous hemostasis was obtained and the pocket was dried.      The old generator/battery, Activa SC, was disconnected from the extension wire and taken off the field.  The connector contacts were cleaned.  The new generator/battery, Medtronic model Activa SC,  was connected to the extension wire.  Therefore, one electrode array was connected to generator/battery and secured.  And then the new generator/battery was placed back into the pocket along with the excess extension wire being placed posteriorly and around the periphery of the generator/battery.  The generator/battery was secured to the new position within the pocket using two 2-0 Ethibond sutures.    After the replacements, two electrode arrays were connected to the generators/batteries.  The right side DBS system was tested and interrogated electrically and was found to be working well and impedance values were noted to be within normal.  The left side DBS system was tested and interrogated electrically and was found to be working well and impedance values were noted to be within normal.  No problems were found.    After this, we turned our attention to closing.  We reapproximated the left side incision first.  We reapproximated the fibrous capsule of the generator/battery using 3-0 Vicryl sutures in a simple interrupted fashion.  The subcutaneous fat layer was then reapproximated using 3-0 Vicryl sutures in an inverted interrupted fashion to provide padding to the skin for the closure.  The dermal layer was then reapproximated using 3-0 Vicryl sutures in an inverted interrupted fashion.  The skin was then reapproximated using a 4-0 Monocryl suture in a running subcuticular fashion.  We reapproximated the right side incision next.  We reapproximated the fibrous capsule of the generator/battery using 3-0 Vicryl sutures in a simple interrupted fashion.  The subcutaneous fat layer was then reapproximated using 3-0 Vicryl sutures in an inverted interrupted fashion to provide padding to the skin for the closure.  The dermal layer was then reapproximated using 3-0 Vicryl sutures in an inverted interrupted fashion.  The skin was then reapproximated using a 4-0 Monocryl suture in a running subcuticular fashion.  The left  and right chest wounds were then cleaned with wet and dry sponges followed by ChloraPrep and then Exofin skin glue was applied.    At the end of the entire operation, the drapes were taken down and the patient was gently reversed from the monitored anesthesia care.  She was then transferred back onto her hospital [facility] for transport to the postanesthesia care unit for ongoing recovery.  At the end of the case, all counts including needle, sponge and instrument counts were correct x 2.  There were no complications.    IKenan M.D., Ph.D., Neurosurgery Attending, was present and scrubbed for the entire case and performed the key and critical portions of the case.      KENAN HUGHES MD, PHD

## 2020-12-30 NOTE — PROGRESS NOTES
[facility]: Post-Discharge Note  SITUATION                                                      Admission:    Admission Date: 12/29/20   Reason for Admission: DBS battery replacement  Discharge:   Discharge Date: 12/29/20  Discharge Diagnosis: Essential tremor  Discharge Service: Neurosurgery  Discharge Plan: Routine postop follow up    BACKGROUND                                                      Neurosurgery Discharge Coordination Note     Attending physician: Dr. Collins  Discharge to: Home     Current status: Patient states she  feels very good and denies pain.   Denies redness, swelling, increased tenderness, drainage, incision opening or elevated temp. Reports Incision CDI without signs of infection.  Denies current bowel or bladder issues.    Discharge instructions and medications reviewed with patient.  Follow up appointments/imaging/tests needed: 2 week post op with Dr. Collins on 1/18 at 9:45. Pt understands this is an in-clinic appointment.     RN triage/on call number given: [phone]/ [phone]        ASSESSMENT      Discharge Assessment  Patient reports symptoms are: Improved  Does the patient have all of their medications?: Yes  Does patient know what their new medications are for?: Yes  Does patient have a follow-up appointment scheduled?: Yes  Does patient have any other questions or concerns?: No    Post-op  Did the patient have surgery or a procedure: Yes  Incision: closed;healing  Drainage: No  Bleeding: none  Fever: No  Chills: No  Redness: No  Warmth: No  Swelling: No  Incision site pain: No  Closure: suture;dissolving  Eating & Drinking: eating and drinking without complaints/concerns  PO Intake: regular diet  Bowel Function: normal  Urinary Status: voiding without complaint/concerns        PLAN                                                      Outpatient Plan:  Routine postop follow up    Future Appointments   Date Time Provider Department Center   1/18/2021  9:45 AM  Noe Collins MD [facility]           YUNG CAMPO RN

## 2021-01-18 ENCOUNTER — OFFICE VISIT (OUTPATIENT)
Dept: NEUROSURGERY | Facility: CLINIC | Age: 86
End: 2021-01-18
Payer: COMMERCIAL

## 2021-01-18 VITALS
RESPIRATION RATE: 16 BRPM | SYSTOLIC BLOOD PRESSURE: 106 MMHG | HEART RATE: 59 BPM | DIASTOLIC BLOOD PRESSURE: 61 MMHG | OXYGEN SATURATION: 98 %

## 2021-01-18 DIAGNOSIS — Z96.89 STATUS POST DEEP BRAIN STIMULATOR PLACEMENT: ICD-10-CM

## 2021-01-18 DIAGNOSIS — G25.0 ESSENTIAL TREMOR: ICD-10-CM

## 2021-01-18 DIAGNOSIS — Z45.42 END OF BATTERY LIFE OF DEEP BRAIN STIMULATOR: Primary | ICD-10-CM

## 2021-01-18 DIAGNOSIS — R25.1 TREMOR: ICD-10-CM

## 2021-01-18 PROCEDURE — 99024 POSTOP FOLLOW-UP VISIT: CPT | Performed by: NEUROLOGICAL SURGERY

## 2021-01-18 ASSESSMENT — PAIN SCALES - GENERAL: PAINLEVEL: NO PAIN (0)

## 2021-01-18 NOTE — NURSING NOTE
Chief Complaint   Patient presents with     RECHECK     [facility] Return - 2 wk post-op     Harshal Hammond

## 2021-01-18 NOTE — LETTER
1/18/2021       RE: Jennifer Ferrera  [address]  [facility] [address]     Dear Colleague,    Thank you for referring your patient, Jennifer Ferrera, to the [facility]UROSURGERY [facility]IN[facility] [place] [facility]. Please see a copy of my visit note below.    NEUROSURGERY    HISTORY AND PHYSICAL EXAM    Chief Complaint   Patient presents with     RECHECK     [facility] Return - 2 wk post-op         HISTORY OF PRESENT ILLNESS  Ms. Jennifer Ferrera returns today with her daughter for her follow-up after the bilateral DBS generators/batteries replacement on 12/29/2020.  Patient reports that she is doing well and there are no complaints.  She reports no pain.  She did not have to take any pain medication since her discharge.  She denies any fevers, chills, nausea, vomiting, dizziness, weakness, numbness or seizure like activity.  She reports that the incisions are looking good and there is no redness, no drainage and no fluid collection.  Overall, she is quite happy with the recent surgery.        In summary, She is a 89 year old female with long standing tremor.  She underwent bilateral DBS placement back in October of 2002 with electrodes in the bilateral ventral intermediate nucleus of the thalamus.  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  Since then, she has had bilateral DBS system with numerous generators/batteries being replaced.  She had the replacement surgeries in 7/19/2008, 7/2/2013 and 11/8/2016.  Her last replacement surgery was with Dr. Martinez.  It appears that her generators/batteries last her about four years now.  She is followed by Dr. Neves and she was seen via video visit on 12/11/2020.  She reached out to the neurology team that her generators/batteries were at [facility].  Patient states that she is in her usual state of health.  She denies any recent illness but she is being treated for a leg sore which is healing ok.  She has  one more treatment.  She was on an antibiotic some time ago for this but she is not on it now.  There is no report of active infection.  She does take a baby aspirin, 81 mg, daily and she took a dose today.  She is not on any other anticoagulants or antiplatelet therapy.  In terms of her device, she has the DBS on all the time.  Per patient's daughter, it is on the highest setting for her at this time.  She is getting more tremors recently.  She denies any exposed hardware anywhere.  She also denies any abnormal shocks or undesirable stimulation effects.  She is also ok with the current locations of the generators/batteries.  She has bilateral Activa SC, model [id number].        Past Medical History:   Diagnosis Date     1st degree AV block 10/20/2015    ECG shows mild sinus bradycardia with 1st degree heart block and min voltage criteria for LVH; borderline ECG     Anxiety      Dysarthria      Facial numbness      Hearing loss      Hypercholesterolemia      Hypertension      Low back pain      Lymphedema      Macular degeneration (senile) of retina      Memory loss      Mood change      Osteoporosis, unspecified      PSEUDOBULBAR AFFECT      Recurrent falls      Skin cancer      Tremor      Urinary incontinence, urge      Urinary urgency      Wears glasses        Past Surgical History:   Procedure Laterality Date     CHOLECYSTECTOMY       Excision of basal cell carcinoma from RUE  Sep 2015    right shoulder     EXTRACAPSULAR CATARACT EXTRATION WITH INTRAOCULAR LENS IMPLANT       EXTRACAPSULAR CATARACT EXTRATION WITH INTRAOCULAR LENS IMPLANT       HYSTERECTOMY       REPLACE DEEP BRAIN STIMULATION GENERATOR / BATTERY Bilateral 12/29/2020    Procedure: Replacement of deep brain stimulator generator/battery over bilateral chest wall  **Latex Allergy**;  Surgeon: Noe Collins MD;  Location: [facility] OR     REPLACE DEEP BRAIN STIMULATION GENERATOR / BATTERY BILATERAL Bilateral 11/8/2016    Procedure: REPLACE DEEP BRAIN  STIMULATION GENERATOR / BATTERY BILATERAL;  Surgeon: Bentley Martinez MD;  Location: [facility] OR     REPLACE PULSE GENERATOR SUBCUTANEOUS BILATERAL  29 Jul 2008    Replaced both soletra     REPLACE PULSE GENERATOR SUBCUTANEOUS BILATERAL  7/2/2013    Procedure: REPLACE PULSE GENERATOR SUBCUTANEOUS BILATERAL;  Bilateral Deep Brain Stimulator Battery Exchange ;  Surgeon: Bentley Martinez MD;  Location: [facility] OR     Right DBS Lead removal  jan 2003    infected right lead removed; was in a nursing home on antiobiotics     STEREOTACTIC INSERTION DEEP BRAIN STIMULATION  12 May 2003    placed lead in may after removal of right thalamic  DBS lead due to infection feb 17 2003     STEREOTACTIC INSERTION DEEP BRAIN STIMULATION  9 jun 2003    revision of right DBS electrode     STEREOTACTIC INSERTION DEEP BRAIN STIMULATION  oct 2002    left thalamic DBS lead placed     STEREOTACTIC INSERTION DEEP BRAIN STIMULATION  oct 2002    right thalamic DBS lead placed       Social History     Socioeconomic History     Marital status:      Spouse name: Not on file     Number of children: Not on file     Years of education: Not on file     Highest education level: Not on file   Occupational History     Not on file   Social Needs     Financial resource strain: Not on file     Food insecurity     Worry: Not on file     Inability: Not on file     Transportation needs     Medical: Not on file     Non-medical: Not on file   Tobacco Use     Smoking status: Never Smoker     Smokeless tobacco: Never Used   Substance and Sexual Activity     Alcohol use: No     Drug use: No     Sexual activity: Not on file   Lifestyle     Physical activity     Days per week: Not on file     Minutes per session: Not on file     Stress: Not on file   Relationships     Social connections     Talks on phone: Not on file     Gets together: Not on file     Attends Gnosticist service: Not on file     Active member of club or organization: Not on file     Attends  meetings of clubs or organizations: Not on file     Relationship status: Not on file     Intimate partner violence     Fear of current or ex partner: Not on file     Emotionally abused: Not on file     Physically abused: Not on file     Forced sexual activity: Not on file   Other Topics Concern     Parent/sibling w/ CABG, MI or angioplasty before 65F 55M? Not Asked   Social History Narrative    lives in [place]. . Daughter Mary Ann Mijares.       Family History   Problem Relation Age of Onset     Diabetes Father      Parkinsonism Mother      Deep Vein Thrombosis Sister      Parkinsonism Sister      Neurologic Disorder Brother         tremor     Neurologic Disorder Brother         tremor     Cancer Brother         gallbladder     Neurologic Disorder Brother         tremor     Cancer Brother         throat     Connective Tissue Disorder Sister         tremor     Parkinsonism Other         niece     Parkinsonism Other         niece       Current Outpatient Medications   Medication     carboxymethylcellulose (REFRESH) 1 % ophthalmic solution     [START ON 1/20/2021] Cholecalciferol (VITAMIN D3) 25 MCG (1000 UT) CAPS     escitalopram (LEXAPRO) 10 MG tablet     furosemide (LASIX) 40 MG tablet     levothyroxine (SYNTHROID/LEVOTHROID) 50 MCG tablet     Multiple Vitamins-Minerals (OCUVITE PRESERVISION PO)     pravastatin (PRAVACHOL) 20 MG tablet     Probiotic Product (PROBIOTIC-10 PO)     SENNA-docusate sodium (SENNA S) 8.6-50 MG tablet     UNABLE TO FIND     No current facility-administered medications for this visit.        Allergies   Allergen Reactions     Adhesive Tape      Latex      Vioxx      Pt. Is unaware of any allergy to this med.       REVIEW OF SYSTEMS:  General: Negative for chills/sweats/fever. difficulty sleeping, headache, recent fatigue, or weight gain/loss.  Eyes: Negative for blurred vision, crossed eyes, double vision, recent eye infections, vision flashes, or vision  halos.  Ears/Nose/Mouth/Throat: POSITIVE for hearing loss.  Negative for bleeding gums, difficulty swallowing, earache, ear discharge, hoarseness, nosebleeds, tinnitus, or sinus problems.  Respiratory:Negative for chronic cough, coughing blood, night sweats, shortness of breath, Tuberculosis, or wheezing.  Cardiovascular: POSITIVE for dyslipidemia, hypertension, dysrhythmias. Negative for chest pain, dyspnea at night, heart murmur, palpitations, pacemaker, pacemaker, poor circulation, swollen legs/feet, or varicose veins.  Gastrointestinal: POSITIVE for GERD, constipation. Negative for melena, hematochezia, chronic diarrhea, Hepatitis A/B/C, Liver Disease, nausea, or vomiting.   Genitourinary: POSITIVE for urinary incontinence.  Negative for Urinary retention, genital discharge, prostate problems, urgency, or UTI.   Neurological: POSITIVE for essential tremor.  Negative for syncope, headaches, numbness of arms/legs, tingling in hands/arms/legs, memory problems, or seizures.  Psychological: POSITIVE for anxiety. Negative for depression, panic attacks, or restlessness.  Skin: Negative for chronic skin itching, color changes in hand/feet when cold, poor scarring, non-healing ulcers, skin rashes/hives, unusual moles.  Musculoskeletal: POSITIVE for arthritis, osteoporosis.  Negative for joint swelling in hands/wrists/hips/knees/joints, muscle tenderness in arms/legs.  Endocrine: POSITIVE for hypothyroidism, obesity. Negative for excessive thirst/hunger, intolerance for warm rooms, loss of libido, multiple broken bones, rapid weight gain/loss, galactorrhea.  Hematologic/Lymphatic: Negative for easy skin bruising, significant fatigue, prolonged bleeding, tender glands/lymph nodes.  Allergies: Negative for asthma or hay fever.      PHYSICAL EXAM  /61   Pulse 59   Resp 16   SpO2 98% .     General: Awake, alert, oriented. Well nourished, well developed, she is not in any acute distress.  Sitting in a wheelchair.    HEENT: Head normocephalic, atraumatic. Neck supple. Good range of motion. No palpable thyroid mass.  Extremity: Warm with no clubbing or cyanosis. No lower extremity edema.    Incisions: Left and right chest wall incisions are healing well.  No redness.  No drainage.  No fluid collection.  Remaining skin glue dressing was removed without difficulty.    Neurological  Awake, alert and oriented to date, time, place and person.  Speech is fluent.   Face symmetric.  Hard of hearing.    Motor: Moves all extremities.  Slow movement.  Sensation: intact to light touch and pinpoint.      ASSESSMENT   89 year old female  1.  Essential tremor  2.  S/p bilateral DBS placement, October of 2002  3.  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  S/p numerous replacement of bilateral DBS generators/batteries over bilateral chest wall, in 7/29/2008, 7/2/2013, and last replacement in 11/8/2016  5.  Depleted deep brain stimulator generators/batteries, model Activa SC, over the left and right chest wall  6.  S/p replacement of deep brain stimulator generators/batteries over bilateral chest walls on 12/29/2020    Patient is recovering well from the recent DBS generators/batteries replacement surgery.  Her incisions are healing well with no signs of infection.  She is doing well overall.      Based on her usage, she will likely need replacement of her bilateral DBS generators/batteries in about four years.      PLAN:  1.  Follow up with neurosurgery as needed.      15 minutes were spent on this encounter with 7 minutes of direct patient care including incision check and incision cleaning.  8 minutes were spent completing documentation review including the operative note and documentation completion.      Again, thank you for allowing me to participate in the care of your patient.      Sincerely,    Noe Collins MD

## 2021-01-18 NOTE — PROGRESS NOTES
NEUROSURGERY    HISTORY AND PHYSICAL EXAM    Chief Complaint   Patient presents with     RECHECK     P Return - 2 wk post-op         HISTORY OF PRESENT ILLNESS  Ms. Jennifer Ferrera returns today with her daughter for her follow-up after the bilateral DBS generators/batteries replacement on 12/29/2020.  Patient reports that she is doing well and there are no complaints.  She reports no pain.  She did not have to take any pain medication since her discharge.  She denies any fevers, chills, nausea, vomiting, dizziness, weakness, numbness or seizure like activity.  She reports that the incisions are looking good and there is no redness, no drainage and no fluid collection.  Overall, she is quite happy with the recent surgery.        In summary, She is a 89 year old female with long standing tremor.  She underwent bilateral DBS placement back in October of 2002 with electrodes in the bilateral ventral intermediate nucleus of the thalamus.  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  Since then, she has had bilateral DBS system with numerous generators/batteries being replaced.  She had the replacement surgeries in 7/19/2008, 7/2/2013 and 11/8/2016.  Her last replacement surgery was with Dr. Martinez.  It appears that her generators/batteries last her about four years now.  She is followed by Dr. Neves and she was seen via video visit on 12/11/2020.  She reached out to the neurology team that her generators/batteries were at [facility].  Patient states that she is in her usual state of health.  She denies any recent illness but she is being treated for a leg sore which is healing ok.  She has one more treatment.  She was on an antibiotic some time ago for this but she is not on it now.  There is no report of active infection.  She does take a baby aspirin, 81 mg, daily and she took a dose today.  She is not on any other anticoagulants or antiplatelet therapy.  In terms of her device, she has the  DBS on all the time.  Per patient's daughter, it is on the highest setting for her at this time.  She is getting more tremors recently.  She denies any exposed hardware anywhere.  She also denies any abnormal shocks or undesirable stimulation effects.  She is also ok with the current locations of the generators/batteries.  She has bilateral Activa SC, model [id number].        Past Medical History:   Diagnosis Date     1st degree AV block 10/20/2015    ECG shows mild sinus bradycardia with 1st degree heart block and min voltage criteria for LVH; borderline ECG     Anxiety      Dysarthria      Facial numbness      Hearing loss      Hypercholesterolemia      Hypertension      Low back pain      Lymphedema      Macular degeneration (senile) of retina      Memory loss      Mood change      Osteoporosis, unspecified      PSEUDOBULBAR AFFECT      Recurrent falls      Skin cancer      Tremor      Urinary incontinence, urge      Urinary urgency      Wears glasses        Past Surgical History:   Procedure Laterality Date     CHOLECYSTECTOMY       Excision of basal cell carcinoma from RUE  Sep 2015    right shoulder     EXTRACAPSULAR CATARACT EXTRATION WITH INTRAOCULAR LENS IMPLANT       EXTRACAPSULAR CATARACT EXTRATION WITH INTRAOCULAR LENS IMPLANT       HYSTERECTOMY       REPLACE DEEP BRAIN STIMULATION GENERATOR / BATTERY Bilateral 12/29/2020    Procedure: Replacement of deep brain stimulator generator/battery over bilateral chest wall  **Latex Allergy**;  Surgeon: Noe Collins MD;  Location: [facility] OR     REPLACE DEEP BRAIN STIMULATION GENERATOR / BATTERY BILATERAL Bilateral 11/8/2016    Procedure: REPLACE DEEP BRAIN STIMULATION GENERATOR / BATTERY BILATERAL;  Surgeon: Bentley Martinez MD;  Location: [facility] OR     REPLACE PULSE GENERATOR SUBCUTANEOUS BILATERAL  29 Jul 2008    Replaced both soletra     REPLACE PULSE GENERATOR SUBCUTANEOUS BILATERAL  7/2/2013    Procedure: REPLACE PULSE GENERATOR SUBCUTANEOUS BILATERAL;   Bilateral Deep Brain Stimulator Battery Exchange ;  Surgeon: Bentley Martinez MD;  Location: [facility] OR     Right DBS Lead removal  jan 2003    infected right lead removed; was in a nursing home on antiobiotics     STEREOTACTIC INSERTION DEEP BRAIN STIMULATION  12 May 2003    placed lead in may after removal of right thalamic  DBS lead due to infection feb 17 2003     STEREOTACTIC INSERTION DEEP BRAIN STIMULATION  9 jun 2003    revision of right DBS electrode     STEREOTACTIC INSERTION DEEP BRAIN STIMULATION  oct 2002    left thalamic DBS lead placed     STEREOTACTIC INSERTION DEEP BRAIN STIMULATION  oct 2002    right thalamic DBS lead placed       Social History     Socioeconomic History     Marital status:      Spouse name: Not on file     Number of children: Not on file     Years of education: Not on file     Highest education level: Not on file   Occupational History     Not on file   Social Needs     Financial resource strain: Not on file     Food insecurity     Worry: Not on file     Inability: Not on file     Transportation needs     Medical: Not on file     Non-medical: Not on file   Tobacco Use     Smoking status: Never Smoker     Smokeless tobacco: Never Used   Substance and Sexual Activity     Alcohol use: No     Drug use: No     Sexual activity: Not on file   Lifestyle     Physical activity     Days per week: Not on file     Minutes per session: Not on file     Stress: Not on file   Relationships     Social connections     Talks on phone: Not on file     Gets together: Not on file     Attends Episcopalian service: Not on file     Active member of club or organization: Not on file     Attends meetings of clubs or organizations: Not on file     Relationship status: Not on file     Intimate partner violence     Fear of current or ex partner: Not on file     Emotionally abused: Not on file     Physically abused: Not on file     Forced sexual activity: Not on file   Other Topics Concern      Parent/sibling w/ CABG, MI or angioplasty before 65F 55M? Not Asked   Social History Narrative    lives in [place]. . Daughter Mary Ann Mijares.       Family History   Problem Relation Age of Onset     Diabetes Father      Parkinsonism Mother      Deep Vein Thrombosis Sister      Parkinsonism Sister      Neurologic Disorder Brother         tremor     Neurologic Disorder Brother         tremor     Cancer Brother         gallbladder     Neurologic Disorder Brother         tremor     Cancer Brother         throat     Connective Tissue Disorder Sister         tremor     Parkinsonism Other         niece     Parkinsonism Other         niece       Current Outpatient Medications   Medication     carboxymethylcellulose (REFRESH) 1 % ophthalmic solution     [START ON 1/20/2021] Cholecalciferol (VITAMIN D3) 25 MCG (1000 UT) CAPS     escitalopram (LEXAPRO) 10 MG tablet     furosemide (LASIX) 40 MG tablet     levothyroxine (SYNTHROID/LEVOTHROID) 50 MCG tablet     Multiple Vitamins-Minerals (OCUVITE PRESERVISION PO)     pravastatin (PRAVACHOL) 20 MG tablet     Probiotic Product (PROBIOTIC-10 PO)     SENNA-docusate sodium (SENNA S) 8.6-50 MG tablet     UNABLE TO FIND     No current facility-administered medications for this visit.        Allergies   Allergen Reactions     Adhesive Tape      Latex      Vioxx      Pt. Is unaware of any allergy to this med.       REVIEW OF SYSTEMS:  General: Negative for chills/sweats/fever. difficulty sleeping, headache, recent fatigue, or weight gain/loss.  Eyes: Negative for blurred vision, crossed eyes, double vision, recent eye infections, vision flashes, or vision halos.  Ears/Nose/Mouth/Throat: POSITIVE for hearing loss.  Negative for bleeding gums, difficulty swallowing, earache, ear discharge, hoarseness, nosebleeds, tinnitus, or sinus problems.  Respiratory:Negative for chronic cough, coughing blood, night sweats, shortness of breath, Tuberculosis, or wheezing.  Cardiovascular:  POSITIVE for dyslipidemia, hypertension, dysrhythmias. Negative for chest pain, dyspnea at night, heart murmur, palpitations, pacemaker, pacemaker, poor circulation, swollen legs/feet, or varicose veins.  Gastrointestinal: POSITIVE for GERD, constipation. Negative for melena, hematochezia, chronic diarrhea, Hepatitis A/B/C, Liver Disease, nausea, or vomiting.   Genitourinary: POSITIVE for urinary incontinence.  Negative for Urinary retention, genital discharge, prostate problems, urgency, or UTI.   Neurological: POSITIVE for essential tremor.  Negative for syncope, headaches, numbness of arms/legs, tingling in hands/arms/legs, memory problems, or seizures.  Psychological: POSITIVE for anxiety. Negative for depression, panic attacks, or restlessness.  Skin: Negative for chronic skin itching, color changes in hand/feet when cold, poor scarring, non-healing ulcers, skin rashes/hives, unusual moles.  Musculoskeletal: POSITIVE for arthritis, osteoporosis.  Negative for joint swelling in hands/wrists/hips/knees/joints, muscle tenderness in arms/legs.  Endocrine: POSITIVE for hypothyroidism, obesity. Negative for excessive thirst/hunger, intolerance for warm rooms, loss of libido, multiple broken bones, rapid weight gain/loss, galactorrhea.  Hematologic/Lymphatic: Negative for easy skin bruising, significant fatigue, prolonged bleeding, tender glands/lymph nodes.  Allergies: Negative for asthma or hay fever.      PHYSICAL EXAM  /61   Pulse 59   Resp 16   SpO2 98% .     General: Awake, alert, oriented. Well nourished, well developed, she is not in any acute distress.  Sitting in a wheelchair.   HEENT: Head normocephalic, atraumatic. Neck supple. Good range of motion. No palpable thyroid mass.  Extremity: Warm with no clubbing or cyanosis. No lower extremity edema.    Incisions: Left and right chest wall incisions are healing well.  No redness.  No drainage.  No fluid collection.  Remaining skin glue dressing was  removed without difficulty.    Neurological  Awake, alert and oriented to date, time, place and person.  Speech is fluent.   Face symmetric.  Hard of hearing.    Motor: Moves all extremities.  Slow movement.  Sensation: intact to light touch and pinpoint.      ASSESSMENT   89 year old female  1.  Essential tremor  2.  S/p bilateral DBS placement, October of 2002  3.  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  S/p numerous replacement of bilateral DBS generators/batteries over bilateral chest wall, in 7/29/2008, 7/2/2013, and last replacement in 11/8/2016  5.  Depleted deep brain stimulator generators/batteries, model Activa SC, over the left and right chest wall  6.  S/p replacement of deep brain stimulator generators/batteries over bilateral chest walls on 12/29/2020    Patient is recovering well from the recent DBS generators/batteries replacement surgery.  Her incisions are healing well with no signs of infection.  She is doing well overall.      Based on her usage, she will likely need replacement of her bilateral DBS generators/batteries in about four years.      PLAN:  1.  Follow up with neurosurgery as needed.      15 minutes were spent on this encounter with 7 minutes of direct patient care including incision check and incision cleaning.  8 minutes were spent completing documentation review including the operative note and documentation completion.

## 2021-03-23 ENCOUNTER — RECORDS - HEALTHEAST (OUTPATIENT)
Dept: LAB | Facility: CLINIC | Age: 86
End: 2021-03-23

## 2021-03-23 LAB
ANION GAP SERPL CALCULATED.3IONS-SCNC: 8 MMOL/L (ref 5–18)
BUN SERPL-MCNC: 15 MG/DL (ref 8–28)
CALCIUM SERPL-MCNC: 8.8 MG/DL (ref 8.5–10.5)
CHLORIDE BLD-SCNC: 105 MMOL/L (ref 98–107)
CHOLEST SERPL-MCNC: 135 MG/DL
CO2 SERPL-SCNC: 27 MMOL/L (ref 22–31)
CREAT SERPL-MCNC: 0.85 MG/DL (ref 0.6–1.1)
FASTING STATUS PATIENT QL REPORTED: ABNORMAL
GFR SERPL CREATININE-BSD FRML MDRD: >60 ML/MIN/1.73M2
GLUCOSE BLD-MCNC: 89 MG/DL (ref 70–125)
HDLC SERPL-MCNC: 42 MG/DL
LDLC SERPL CALC-MCNC: 79 MG/DL
POTASSIUM BLD-SCNC: 4.5 MMOL/L (ref 3.5–5)
SODIUM SERPL-SCNC: 140 MMOL/L (ref 136–145)
TRIGL SERPL-MCNC: 70 MG/DL
TSH SERPL DL<=0.005 MIU/L-ACNC: 3.39 UIU/ML (ref 0.3–5)

## 2021-04-03 ENCOUNTER — HEALTH MAINTENANCE LETTER (OUTPATIENT)
Age: 86
End: 2021-04-03

## 2021-04-22 ENCOUNTER — TELEPHONE (OUTPATIENT)
Dept: NEUROLOGY | Facility: CLINIC | Age: 86
End: 2021-04-22

## 2021-04-22 NOTE — TELEPHONE ENCOUNTER
[facility] Call Center    Phone Message    May a detailed message be left on voicemail: yes     Reason for Call: Other: Patients daughter calling stating patient has been out of her Vitamin D and has purchages over the counter Vitamin D and wants to know if they are the same, Mary Ann states the over the counter bottle states Vitamin D3 25 mcg 1000 units, please call to disucss thank you.      Action Taken: Message routed to:  [facility]    Travel Screening: Not Applicable

## 2021-04-23 NOTE — TELEPHONE ENCOUNTER
Per message from Dr. Neves there is no difference between the over the counter vitamin D supplement and the pharmacy dispenses.

## 2021-06-01 ENCOUNTER — RECORDS - HEALTHEAST (OUTPATIENT)
Dept: ADMINISTRATIVE | Facility: CLINIC | Age: 86
End: 2021-06-01

## 2021-06-11 NOTE — PROGRESS NOTES
[facility]   Balance Initial Evaluation    Patient Name: Jennifer Ferrera  Date of evaluation: 6/2/2017  Referral Diagnosis:  Fall [W19.XXXA]  - Primary       Impaired functional mobility, balance, gait, and endurance [Z74.09]         Referring provider: Michelle Carvajal MD  Visit Diagnosis:     ICD-10-CM    1. Unsteadiness on feet R26.81    2. Abnormality of gait R26.9    3. Generalized muscle weakness M62.81        Assessment:   Jennifer Ferrera is a 85 y.o. female who presents to therapy today with chief complaints of balance impairments after falling about 2 weeks ago. Pt fell backwards while throwing garbage away. She did not obtain any serious injuries but was unable to stand on her own. She required the assist of the paramedics to return to standing from the ground. She also has L hip pain that limits her ability to tolerate standing position. She is at a falls risk with a Wilde score of 31/56, 2 MWT of 60 m, 9 sit to stands in 30 sec and decreased LE strength leyda. Her balance affects her ability to walk, stand, and complete household chores.    Patient will benefit from 1:1 skilled PT services to address the above limitations.     Goals:  Pt. will be independent with home exercise program in : 4 weeks (to self-manage pain and improve function)  Patient will increase : Wilde score;for improved quality of function;for improved quality of life;in 12 weeks;by _ points  by ___ points: >5 pts  Pt will: improve 2 MWT by >25 m in 12 weeks to improve community ambulation speed and safety.    Patient's expectations/goals are realistic.    Barriers to Learning or Achieving Goals:  Age.        Plan / Patient Instructions:        Plan of Care:   Authorization / Certification Start Date: 06/02/17  Authorization / Certification End Date: 08/31/17  Authorization / Certification Number of Visits: 12  Communication with: Referral Source  Patient Related Instruction: Nature of Condition;Treatment plan and rationale;Self Care  "instruction;Posture;Expected outcome;Body mechanics;Next steps;Precautions;Basis of treatment  Times per Week: 1  Number of Weeks: 12  Number of Visits: 12  Therapeutic Exercise: ROM;Stretching;Strengthening  Neuromuscular Reeducation: kinesio tape;posture;balance/proprioception;core  Manual Therapy: soft tissue mobilization;myofascial release;joint mobilization;muscle energy  Gait Training: to decrease falls risk  Equipment: theraband    Plan for next visit: progress standing balance exercises     Subjective:       Pt fell about 2 weeks ago. She lost her balance in her garage. She threw something into her garage can and fell backwards. She did not hit her head. She denies any bruising. She was unable to get up and has life alert. A home nurse arrived and was unable to help her up. The paramedics arrived and helped her up.  She has fallen several times throughout the last 5 years. She has a 3 wheeled walker and started using it about 2 years ago. She also has 2 4ww and 2 3ww. The 3ww stays in her car.    She was previously going to [facility] for her hip pain. She is doing some of the exercises- supine \"butt exercises\" bridging, hip abd standing and S/L, marching standing, sit to stand,       Past medical history/precautions: skin cancer, heart implant, arthritis, OP    Living Situation:[facility]- one level; no stairs  Caregiver Support: lives alone; home nurse comes every 2 years; neighbor brings in her mail, neighbor helps with grocery store (usually she will do it on her own)  Has help from someone with cleaning      Current Activity Level: she tries to walk everyday; yesterday she walked 400 steps  Chief Complaint: improve her balance after falling    Patient's Functional Goal: walking further in the grocery store- about 1 hour  \"help maintain balance\"      Pain  Pain Ratin- edema in her LEs  Pain rating at best: 0 sitting  Also reports L hip pain that she was seeing a PT at [place] to treat.       "   Objective:      Note: Items left blank indicates the item was not performed or not indicated at the time of the evaluation.    Balance Examination  1. Unsteadiness on feet     2. Abnormality of gait     3. Generalized muscle weakness           Posture Observation: moderated forward flexed posture in standing  Assistive Device: 3ww    Transitional movements:          Sit?Stand:  Modified Independent    Sit? Supine:  Independent   Supine? Sit:   Independent    Strength    Strength   L / R ROM   L / R Comments   hiSeated Hip Flexion 4/4   Limited hip flexion, IR, and ER leyda with pain with PROM L>R    Knee Extension 5/5      Dorsi Flexion 5/4                   Supine Straight Leg Raise (degrees) 70/70 with pain with each      Quad activation WFL            Side lying Hip Abduction 2+/2+      Hip Adduction             Prone Hip Extension       Knee Flexion             Standing Plantar Flexion*       Dorsi Flexion        *Standing PF (single heel raise with UE support for balance only):  5= 16-20 heel raises  4= 10-15 heel raises  3= 5-9 heel raises  2= 1-4 heel raises    Timed Up and Go (TUG): Trial 1: 23 seconds Trial 2: 23 seconds  Trial 3: 22 seconds  Average: 22.7 seconds  Age gender norm: 9-12 sec  AD used? 3ww      30 second sit<>stand: 9 reps  UE support? At thighs  Age gender norm: 35-40th percentile    2 MWT: 60.6 m with 3ww and L hip pain   Age Gender Norm: 134.3 m    Balance:    Firm Surface (seconds) Unstable Surface (seconds)    EO EC EO EC   Romberg (feet together) 60 sec      Sharpened Romberg (tandem)       Semi-Romberg (small step)           Other Balance Test: (Wilde, Tinettti, FGA, Mini-BESTest)    Wilde Balance Scale:  1) Sitting to standing (3)   2) Standing unsupported (4)  3) Sitting with back unsupported but feet supported on floor (4)  4) Standing to sitting (3)  5) Transfer(3)  6) Standing unsupported with eyes closed (3)  7) Standing unsupported with feet together (1)  8) Reaching forward with  outstretched arm while standing (2)  9)  object from the floor from a standing position (3)  10) Turning to look behind left and right shoulder while standing (2)  11) Turn 360 degrees (2) with use of 3ww  12) Place alternate foot on step/stool while standing unsupported (0)  13) Standing unsupported one foot in front (1)  14) Standing on one leg (0)    Total score: 31/56 (<45/56 falls risk)       Treatment Today     TREATMENT MINUTES COMMENTS   Evaluation 45 -balance   Self-care/ Home management     Manual therapy     Neuromuscular Re-education     Therapeutic Activity     Therapeutic Exercises 15 -see exercise flow sheet  -educated on POC, diagnosis and HEP   Gait training     Modality__________________                Total 60    Blank areas are intentional and mean the treatment did not include these items.              Sari Younger, PT, DPT  6/2/2017  11:17 AM

## 2021-06-11 NOTE — PROGRESS NOTES
[facility] Daily Progress     Patient Name: Jennifer Ferrera  Date: 2017  Visit #: 2  PTA visit #:  na  Referral Diagnosis:   Fall [W19.XXXA]  - Primary       Impaired functional mobility, balance, gait, and endurance [Z74.09]         Referring provider: Michelle Carvajal MD  Visit Diagnosis:     ICD-10-CM    1. Unsteadiness on feet R26.81    2. Abnormality of gait R26.9    3. Generalized muscle weakness M62.81          Assessment:     HEP/POC compliance is  good .  Response to Intervention No hip pain with standing exercises today and required 2-3 sitting rest breaks.  Patient is benefitting from skilled physical therapy and is making steady progress toward functional goals.  Patient is appropriate to continue with skilled physical therapy intervention, as indicated by initial plan of care.    Goal Status:  Pt. will be independent with home exercise program in : 4 weeks (to self-manage pain and improve function)  Patient will increase : Wilde score;for improved quality of function;for improved quality of life;in 12 weeks;by _ points  by ___ points: >5 pts  Pt will: improve 2 MWT by >25 m in 12 weeks to improve community ambulation speed and safety.    Plan / Patient Education:     Continue with initial plan of care.  Progress with home program as tolerated.    Subjective:     Pain Ratin  Her hip was sore Monday after the evaluation but has slowly improved since then. It has not hurt at all today. No new falls but got a cut on her hand. Her tailbone hurts when she sits for too long- possibly from her last fall. Exercises are going well x1/day.      Objective:     Increased UE support and postural sway with narrow JOSE activities requiring increased UE support but pt able to maintain balance independently    Treatment Today     TREATMENT MINUTES COMMENTS   Evaluation     Self-care/ Home management     Manual therapy     Neuromuscular Re-education 20 -in parallel bars:  Marching, lateral walking,  backwards  Modified tandem stance 2x1 min leyda with intermittent UE support  Feet together x1 min    -on foam:  Standing 2x1 min    Toe taps x10 B with UE support   Therapeutic Activity     Therapeutic Exercises 8 -NuStep x5 min, WL 5.0  -see exercise flow sheet   Gait training     Modality__________________                Total 28    Blank areas are intentional and mean the treatment did not include these items.       Sari Younger, PT, DPT  6/7/2017      [facility] Discharge Summary    Patient was seen for 2 visits from 6/2/17 to 6/7/17 with 0 missed appointments.  Pt was not approved by [facility] for PT services.    Therapy will be discontinued at this time.  The patient will need a new referral to resume.    Thank you for your referral.  Sari Younger, PT, DPT  7/27/2017  3:54 PM

## 2021-06-18 ENCOUNTER — RECORDS - HEALTHEAST (OUTPATIENT)
Dept: LAB | Facility: CLINIC | Age: 86
End: 2021-06-18

## 2021-06-18 LAB
ALBUMIN SERPL-MCNC: 3.4 G/DL (ref 3.5–5)
ALP SERPL-CCNC: 78 U/L (ref 45–120)
ALT SERPL W P-5'-P-CCNC: 21 U/L (ref 0–45)
ANION GAP SERPL CALCULATED.3IONS-SCNC: 10 MMOL/L (ref 5–18)
AST SERPL W P-5'-P-CCNC: 33 U/L (ref 0–40)
BILIRUB SERPL-MCNC: 0.6 MG/DL (ref 0–1)
BUN SERPL-MCNC: 15 MG/DL (ref 8–28)
CALCIUM SERPL-MCNC: 8.8 MG/DL (ref 8.5–10.5)
CHLORIDE BLD-SCNC: 101 MMOL/L (ref 98–107)
CO2 SERPL-SCNC: 30 MMOL/L (ref 22–31)
CREAT SERPL-MCNC: 0.8 MG/DL (ref 0.6–1.1)
GFR SERPL CREATININE-BSD FRML MDRD: >60 ML/MIN/1.73M2
GLUCOSE BLD-MCNC: 86 MG/DL (ref 70–125)
POTASSIUM BLD-SCNC: 4.5 MMOL/L (ref 3.5–5)
PROT SERPL-MCNC: 5.5 G/DL (ref 6–8)
SODIUM SERPL-SCNC: 141 MMOL/L (ref 136–145)

## 2021-09-12 ENCOUNTER — HEALTH MAINTENANCE LETTER (OUTPATIENT)
Age: 86
End: 2021-09-12

## 2021-11-18 ENCOUNTER — LAB REQUISITION (OUTPATIENT)
Dept: LAB | Facility: CLINIC | Age: 86
End: 2021-11-18

## 2021-11-18 DIAGNOSIS — I10 ESSENTIAL (PRIMARY) HYPERTENSION: ICD-10-CM

## 2021-11-18 LAB
ANION GAP SERPL CALCULATED.3IONS-SCNC: 10 MMOL/L (ref 5–18)
BUN SERPL-MCNC: 17 MG/DL (ref 8–28)
CALCIUM SERPL-MCNC: 9.1 MG/DL (ref 8.5–10.5)
CHLORIDE BLD-SCNC: 103 MMOL/L (ref 98–107)
CO2 SERPL-SCNC: 29 MMOL/L (ref 22–31)
CREAT SERPL-MCNC: 0.75 MG/DL (ref 0.6–1.1)
GFR SERPL CREATININE-BSD FRML MDRD: 71 ML/MIN/1.73M2
GLUCOSE BLD-MCNC: 87 MG/DL (ref 70–125)
POTASSIUM BLD-SCNC: 4.2 MMOL/L (ref 3.5–5)
SODIUM SERPL-SCNC: 142 MMOL/L (ref 136–145)

## 2021-11-18 PROCEDURE — 80048 BASIC METABOLIC PNL TOTAL CA: CPT | Performed by: PHYSICIAN ASSISTANT

## 2022-01-02 ENCOUNTER — APPOINTMENT (OUTPATIENT)
Dept: RADIOLOGY | Facility: HOSPITAL | Age: 87
End: 2022-01-02
Attending: EMERGENCY MEDICINE
Payer: COMMERCIAL

## 2022-01-02 ENCOUNTER — APPOINTMENT (OUTPATIENT)
Dept: CT IMAGING | Facility: HOSPITAL | Age: 87
End: 2022-01-02
Attending: EMERGENCY MEDICINE
Payer: COMMERCIAL

## 2022-01-02 ENCOUNTER — HOSPITAL ENCOUNTER (EMERGENCY)
Facility: HOSPITAL | Age: 87
Discharge: HOME | End: 2022-01-02
Attending: EMERGENCY MEDICINE | Admitting: EMERGENCY MEDICINE
Payer: COMMERCIAL

## 2022-01-02 VITALS
OXYGEN SATURATION: 100 % | SYSTOLIC BLOOD PRESSURE: 164 MMHG | DIASTOLIC BLOOD PRESSURE: 69 MMHG | HEIGHT: 64 IN | TEMPERATURE: 97.6 F | BODY MASS INDEX: 29.71 KG/M2 | RESPIRATION RATE: 16 BRPM | WEIGHT: 174 LBS | HEART RATE: 56 BPM
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
## 2022-01-02 DIAGNOSIS — S00.03XA HEMATOMA OF FRONTAL SCALP, INITIAL ENCOUNTER: ICD-10-CM

## 2022-01-02 DIAGNOSIS — S09.90XA HEAD INJURY, INITIAL ENCOUNTER: ICD-10-CM

## 2022-01-02 PROBLEM — G89.4 CHRONIC PAIN DISORDER: Status: ACTIVE | Noted: 2022-01-02

## 2022-01-02 PROBLEM — E66.9 OBESITY: Status: ACTIVE | Noted: 2022-01-02

## 2022-01-02 PROBLEM — I10 BENIGN HYPERTENSION: Status: ACTIVE | Noted: 2022-01-02

## 2022-01-02 PROBLEM — M16.10 PRIMARY LOCALIZED OSTEOARTHRITIS OF PELVIC REGION AND THIGH: Status: ACTIVE | Noted: 2022-01-02

## 2022-01-02 PROBLEM — R40.0 DAYTIME SOMNOLENCE: Status: ACTIVE | Noted: 2022-01-02

## 2022-01-02 PROBLEM — N95.9 PERIMENOPAUSAL DISORDER: Status: ACTIVE | Noted: 2022-01-02

## 2022-01-02 PROBLEM — R32 URINARY INCONTINENCE: Status: ACTIVE | Noted: 2022-01-02

## 2022-01-02 PROBLEM — M27.2 OSTEOMYELITIS OF MANDIBLE: Status: ACTIVE | Noted: 2022-01-02

## 2022-01-02 PROBLEM — E03.9 ACQUIRED HYPOTHYROIDISM: Status: ACTIVE | Noted: 2022-01-02

## 2022-01-02 PROBLEM — M48.02 SPINAL STENOSIS IN CERVICAL REGION: Status: ACTIVE | Noted: 2022-01-02

## 2022-01-02 PROBLEM — M87.08: Status: ACTIVE | Noted: 2022-01-02

## 2022-01-02 PROBLEM — M81.0 SENILE OSTEOPOROSIS: Status: ACTIVE | Noted: 2022-01-02

## 2022-01-02 LAB
ANION GAP SERPL CALCULATED.3IONS-SCNC: 8 MMOL/L (ref 5–18)
BASOPHILS # BLD AUTO: 0 10E3/UL (ref 0–0.2)
BASOPHILS NFR BLD AUTO: 1 %
BUN SERPL-MCNC: 15 MG/DL (ref 8–28)
CALCIUM SERPL-MCNC: 8.4 MG/DL (ref 8.5–10.5)
CHLORIDE BLD-SCNC: 105 MMOL/L (ref 98–107)
CO2 SERPL-SCNC: 29 MMOL/L (ref 22–31)
CREAT SERPL-MCNC: 0.77 MG/DL (ref 0.6–1.1)
EOSINOPHIL # BLD AUTO: 0.3 10E3/UL (ref 0–0.7)
EOSINOPHIL NFR BLD AUTO: 6 %
ERYTHROCYTE [DISTWIDTH] IN BLOOD BY AUTOMATED COUNT: 12.2 % (ref 10–15)
GFR SERPL CREATININE-BSD FRML MDRD: 73 ML/MIN/1.73M2
GLUCOSE BLD-MCNC: 93 MG/DL (ref 70–125)
HCT VFR BLD AUTO: 37.9 % (ref 35–47)
HGB BLD-MCNC: 12.7 G/DL (ref 11.7–15.7)
HOLD SPECIMEN: NORMAL
IMM GRANULOCYTES # BLD: 0 10E3/UL
IMM GRANULOCYTES NFR BLD: 0 %
LYMPHOCYTES # BLD AUTO: 1.5 10E3/UL (ref 0.8–5.3)
LYMPHOCYTES NFR BLD AUTO: 32 %
MCH RBC QN AUTO: 34.9 PG (ref 26.5–33)
MCHC RBC AUTO-ENTMCNC: 33.5 G/DL (ref 31.5–36.5)
MCV RBC AUTO: 104 FL (ref 78–100)
MONOCYTES # BLD AUTO: 0.4 10E3/UL (ref 0–1.3)
MONOCYTES NFR BLD AUTO: 9 %
NEUTROPHILS # BLD AUTO: 2.4 10E3/UL (ref 1.6–8.3)
NEUTROPHILS NFR BLD AUTO: 52 %
NRBC # BLD AUTO: 0 10E3/UL
NRBC BLD AUTO-RTO: 0 /100
PLATELET # BLD AUTO: 159 10E3/UL (ref 150–450)
POTASSIUM BLD-SCNC: 4.1 MMOL/L (ref 3.5–5)
RBC # BLD AUTO: 3.64 10E6/UL (ref 3.8–5.2)
SODIUM SERPL-SCNC: 142 MMOL/L (ref 136–145)
TROPONIN I SERPL-MCNC: 0.01 NG/ML (ref 0–0.29)
WBC # BLD AUTO: 4.5 10E3/UL (ref 4–11)

## 2022-01-02 PROCEDURE — 250N000013 HC RX MED GY IP 250 OP 250 PS 637: Performed by: EMERGENCY MEDICINE

## 2022-01-02 PROCEDURE — 72125 CT NECK SPINE W/O DYE: CPT

## 2022-01-02 PROCEDURE — 71045 X-RAY EXAM CHEST 1 VIEW: CPT

## 2022-01-02 PROCEDURE — 82310 ASSAY OF CALCIUM: CPT | Performed by: EMERGENCY MEDICINE

## 2022-01-02 PROCEDURE — 85025 COMPLETE CBC W/AUTO DIFF WBC: CPT | Performed by: EMERGENCY MEDICINE

## 2022-01-02 PROCEDURE — 36415 COLL VENOUS BLD VENIPUNCTURE: CPT | Performed by: EMERGENCY MEDICINE

## 2022-01-02 PROCEDURE — 99285 EMERGENCY DEPT VISIT HI MDM: CPT | Mod: 25

## 2022-01-02 PROCEDURE — 70450 CT HEAD/BRAIN W/O DYE: CPT

## 2022-01-02 PROCEDURE — 93005 ELECTROCARDIOGRAM TRACING: CPT | Performed by: EMERGENCY MEDICINE

## 2022-01-02 PROCEDURE — 84484 ASSAY OF TROPONIN QUANT: CPT | Performed by: EMERGENCY MEDICINE

## 2022-01-02 RX ORDER — ACETAMINOPHEN 325 MG/1
650 TABLET ORAL ONCE
Status: COMPLETED | OUTPATIENT
Start: 2022-01-02 | End: 2022-01-02

## 2022-01-02 RX ADMIN — ACETAMINOPHEN 650 MG: 325 TABLET ORAL at 10:32

## 2022-01-02 ASSESSMENT — ENCOUNTER SYMPTOMS
VOMITING: 0
SHORTNESS OF BREATH: 0
ABDOMINAL PAIN: 0
DIZZINESS: 0
DYSURIA: 0
DIARRHEA: 0

## 2022-01-02 ASSESSMENT — MIFFLIN-ST. JEOR: SCORE: 1186.32

## 2022-01-02 NOTE — ED NOTES
Bed: JNED-06  Expected date: 1/2/22  Expected time:   Means of arrival: Ambulance  Comments:  Essence Hernandez F  fall

## 2022-01-02 NOTE — DISCHARGE INSTRUCTIONS
Read and follow the discharge instructions.    Because you hit your head somebody should be with you at least for 24 hours making sure that you are doing okay.    You are going to have a headache take Tylenol for it and you can apply an ice pack to the bruise.    Call your primary care doctor tomorrow to be reevaluated.    Return immediately or call 911 if you are confused, have vomiting, difficulty seeing headache is not well controlled or any other concerns.

## 2022-01-02 NOTE — ED PROVIDER NOTES
EMERGENCY DEPARTMENT ENCOUNTER      NAME: Jennifer Ferrera  AGE: 89 year old female  YOB: 1931  MRN: [id number]  EVALUATION DATE & TIME: 1/2/2022  8:31 AM    PCP: Michelle Carvajal    ED PROVIDER: Danelle Swanson M.D.      CHIEF COMPLAINT     Chief Complaint   Patient presents with     Fall         FINAL IMPRESSION:     1. Fall, initial encounter    2. Head injury, initial encounter    3. Hematoma of frontal scalp, initial encounter          MEDICAL DECISION MAKING:       Pertinent Labs & Imaging studies reviewed. (See chart for details)    89 year old female presents to the Emergency Department for evaluation of fall    ED Course as of 01/02/22 1022   Sun Jan 02, 2022   0858 89-year-old female presents via EMS from home.  She states she was doing well went to the bathroom as she was turning she accidentally fell hit her head.   0858 she says she was doing well denies having any chest pain shortness of breath or dizziness prior to falling.  Did not lose any consciousness.  She herself was able to call EMS.   0859 She currently denies any pain.  States she has had jaw surgery in the past.  Denies   0859 Vomiting diarrhea sany recent illnesses fevers dysuria hematuria chest pain or shortness of breath no extremity pain   0859 On examination elderly female cooperative and pleasant oriented x3 hard of hearing.   0859 Hematoma without active bleeding of the right frontal area otherwise no hyphema no hemotympanum no midline cervical thoracic in a month and is palpation full range of motion of the upper extremities on the lower extremities.   0859 No midline cervical thoracic or lumbar tenderness palpation.   0859 Patient states this was an accident.  Given her age we will check an EKG and some basic blood work.  We will give her some Tylenol ice and initially head given her age and the C-spine.   0900 Differential diagnoses include but not limited to intracranial trauma fracture cervical spine fracture   0931  Reevaluated.  Daughter at bedside.  Patient denies any needs.   0931 CT head no intracranial injury.  Large frontal hematoma.  No fracture.  Stimulator placed in good condition.   1012 CT C-spine no fracture.  Updated on results.  All questions answered feels comfortable being discharged.   1019 Clinical impression and decision making 89-year-old female status post accidental fall hit her head.  Did not lose any consciousness.  She is not anticoagulated.  Came here large hematoma.  No other injuries.  CT head and CT C-spine reveals scalp hematoma no intracranial bleed or fracture.  Basic lab works done patient otherwise denies any vomiting diarrhea states she is doing well.  Daughter is here appears very caring.  They feel comfortable going home.  Denies any urinary symptoms.  We will discharge her with head injury instructions follow-up primary care doctor and strict discharge instructions and return precautions given.  Patient discharged ambulatory in stable condition.   1021 Carbon Dioxide: 29         Vital Signs: Hypertensive  EKG: Sinus rhythm normal anterior progression normal axis  Imaging: CT head hematoma no intracranial injury CT C-spine no fracture.  Chest x-ray stimulators in place no trauma  Home Meds: Reviewed  ED meds/abx: Tylenol  Fluids: Oral    Labs  K 4.1  Cr 0.77  Wbc 4.5  Hgb 12.7  platelets 159  troponin negative      Review of Previous Records  1/18/2021 - Neurosurgery Office Visit, Dr. Collins  89 year old female  1.  Essential tremor  2.  S/p bilateral DBS placement, October of 2002  3.  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  S/p numerous replacement of bilateral DBS generators/batteries over bilateral chest wall, in 7/29/2008, 7/2/2013, and last replacement in 11/8/2016  5.  Depleted deep brain stimulator generators/batteries, model Activa SC, over the left and right chest wall  6.  S/p replacement of deep brain stimulator generators/batteries over  bilateral chest walls on 12/29/2020  Patient is recovering well from the recent DBS generators/batteries replacement surgery.  Her incisions are healing well with no signs of infection.  She is doing well overall.    Based on her usage, she will likely need replacement of her bilateral DBS generators/batteries in about four years.      Consults      ED COURSE   8:32 AM I met with the patient, obtained history, performed an initial exam, and discussed options and plan for diagnostics and treatment here in the ED.   9:32 AM reevaluated and updated    10:13 AM evaluated and updated discussed discharge plan all in agreement.      At the conclusion of the encounter I discussed the results of all of the tests and the disposition. The questions were answered. The patient and daughter acknowledged understanding and was agreeable with the care plan.           MEDICATIONS GIVEN IN THE EMERGENCY:     Medications   acetaminophen (TYLENOL) tablet 650 mg (has no administration in time range)       NEW PRESCRIPTIONS STARTED AT TODAY'S ER VISIT     New Prescriptions    No medications on file          =================================================================    HPI     Patient information was obtained from: patient     Use of : N/A      Jennifer Ferrera is a 89 year old female who presents by EMS for evaluation following a fall.     Today, patient was sitting on the toilet when she turned the wrong way, lost her balance, had an unwitnessed fall, and hit her head. Patient denies loss of consciousness. She was able to get up and call EMS on her own.     Patient lives alone. She denies dizziness, chest pain, or shortness of breath prior to falling. Patient has two pacemakers. She has a medical history significant of hypertension, hyperlipidemia, and s/p brain surgery. Patient denies anticoagulation.     Patient denies vomiting, diarrhea, dysuria, abdominal pain, or any other complaints at this time.       REVIEW OF  SYSTEMS   Review of Systems   Respiratory: Negative for shortness of breath.    Cardiovascular: Negative for chest pain.   Gastrointestinal: Negative for abdominal pain, diarrhea and vomiting.   Genitourinary: Negative for dysuria.   Neurological: Negative for dizziness and syncope.        Positive for head trauma.    All other systems reviewed and are negative.      PAST MEDICAL HISTORY:     Past Medical History:   Diagnosis Date     1st degree AV block 10/20/2015    ECG shows mild sinus bradycardia with 1st degree heart block and min voltage criteria for LVH; borderline ECG     Anxiety      Dysarthria      Facial numbness      Hearing loss      Hypercholesterolemia      Hypertension      Low back pain      Lymphedema      Macular degeneration (senile) of retina      Memory loss      Mood change      Osteoporosis, unspecified      PSEUDOBULBAR AFFECT      Recurrent falls      Skin cancer      Tremor      Urinary incontinence, urge      Urinary urgency      Wears glasses        PAST SURGICAL HISTORY:     Past Surgical History:   Procedure Laterality Date     CHOLECYSTECTOMY       Excision of basal cell carcinoma from RUE  Sep 2015    right shoulder     EXTRACAPSULAR CATARACT EXTRATION WITH INTRAOCULAR LENS IMPLANT       EXTRACAPSULAR CATARACT EXTRATION WITH INTRAOCULAR LENS IMPLANT       HYSTERECTOMY       REPLACE DEEP BRAIN STIMULATION GENERATOR / BATTERY Bilateral 12/29/2020    Procedure: Replacement of deep brain stimulator generator/battery over bilateral chest wall  **Latex Allergy**;  Surgeon: Noe Collins MD;  Location: [facility] OR     REPLACE DEEP BRAIN STIMULATION GENERATOR / BATTERY BILATERAL Bilateral 11/8/2016    Procedure: REPLACE DEEP BRAIN STIMULATION GENERATOR / BATTERY BILATERAL;  Surgeon: Bentley Martinez MD;  Location: [facility] OR     REPLACE PULSE GENERATOR SUBCUTANEOUS BILATERAL  29 Jul 2008    Replaced both soletra     REPLACE PULSE GENERATOR SUBCUTANEOUS BILATERAL  7/2/2013    Procedure:  REPLACE PULSE GENERATOR SUBCUTANEOUS BILATERAL;  Bilateral Deep Brain Stimulator Battery Exchange ;  Surgeon: Bentley Martinez MD;  Location: [facility] OR     Right DBS Lead removal  jan 2003    infected right lead removed; was in a nursing home on antiobiotics     STEREOTACTIC INSERTION DEEP BRAIN STIMULATION  12 May 2003    placed lead in may after removal of right thalamic  DBS lead due to infection feb 17 2003     STEREOTACTIC INSERTION DEEP BRAIN STIMULATION  9 jun 2003    revision of right DBS electrode     STEREOTACTIC INSERTION DEEP BRAIN STIMULATION  oct 2002    left thalamic DBS lead placed     STEREOTACTIC INSERTION DEEP BRAIN STIMULATION  oct 2002    right thalamic DBS lead placed         CURRENT MEDICATIONS:   carboxymethylcellulose (REFRESH) 1 % ophthalmic solution  Cholecalciferol (VITAMIN D3) 25 MCG (1000 UT) CAPS  escitalopram (LEXAPRO) 10 MG tablet  furosemide (LASIX) 40 MG tablet  levothyroxine (SYNTHROID/LEVOTHROID) 50 MCG tablet  Multiple Vitamins-Minerals (OCUVITE PRESERVISION PO)  pravastatin (PRAVACHOL) 20 MG tablet  Probiotic Product (PROBIOTIC-10 PO)  SENNA-docusate sodium (SENNA S) 8.6-50 MG tablet  UNABLE TO FIND         ALLERGIES:     Allergies   Allergen Reactions     Adhesive Tape      Latex      Vioxx      Pt. Is unaware of any allergy to this med.       FAMILY HISTORY:     Family History   Problem Relation Age of Onset     Diabetes Father      Parkinsonism Mother      Deep Vein Thrombosis Sister      Parkinsonism Sister      Neurologic Disorder Brother         tremor     Neurologic Disorder Brother         tremor     Cancer Brother         gallbladder     Neurologic Disorder Brother         tremor     Cancer Brother         throat     Connective Tissue Disorder Sister         tremor     Parkinsonism Other         niece     Parkinsonism Other         niece       SOCIAL HISTORY:     Social History     Socioeconomic History     Marital status:      Spouse name: Not on file      "Number of children: Not on file     Years of education: Not on file     Highest education level: Not on file   Occupational History     Not on file   Tobacco Use     Smoking status: Never Smoker     Smokeless tobacco: Never Used   Substance and Sexual Activity     Alcohol use: No     Drug use: No     Sexual activity: Not on file   Other Topics Concern     Parent/sibling w/ CABG, MI or angioplasty before 65F 55M? Not Asked   Social History Narrative    lives in [place]. . Daughter Mary Ann Mijares.     Social Determinants of Health     Financial Resource Strain: Not on file   Food Insecurity: Not on file   Transportation Needs: Not on file   Physical Activity: Not on file   Stress: Not on file   Social Connections: Not on file   Intimate Partner Violence: Not on file   Housing Stability: Not on file       VITALS:   BP (!) 164/69 (BP Location: Left arm)   Pulse 56   Temp 97.6  F (36.4  C) (Oral)   Resp 16   Ht 1.613 m (5' 3.5\")   Wt 78.9 kg (174 lb)   SpO2 100%   BMI 30.34 kg/m      PHYSICAL EXAM     Physical Exam  Vitals and nursing note reviewed. Exam conducted with a chaperone present.   Constitutional:       Appearance: Normal appearance.   HENT:      Right Ear: Tympanic membrane and ear canal normal.      Left Ear: Tympanic membrane and ear canal normal.   Skin:     Capillary Refill: Capillary refill takes less than 2 seconds.   Neurological:      General: No focal deficit present.      Mental Status: She is alert and oriented to person, place, and time.      GCS: GCS eye subscore is 4. GCS verbal subscore is 5. GCS motor subscore is 6.      Comments: Hard of hearing         Physical Exam   Constitutional: Elderly female, cooperative and pleasant with exam.     Head: Large right frontal hematoma.     Nose: Nose normal.     Mouth/Throat: Oropharynx is clear and moist.     Eyes: EOM are normal. Pupils are equal, round, and reactive to light. No hyphema.     Ears: No hemotympanum     Neck: Normal " range of motion. Neck supple.     Cardiovascular: Normal rate, regular rhythm and normal heart sounds.      Pulmonary/Chest: Normal effort  and breath sounds normal. 2 pacemakers.     Abdominal: Soft. Bowel sounds are normal. Surgical scar.     Musculoskeletal: Normal range of motion.  Bilateral upper extremities bilateral lower extremities.  No midline cervical thoracic and lumbar tenderness palpation.    Neurological: AOx3    Lymphatics: No edema.     Skin: Skin is warm and dry.     Psychiatric: Normal mood and affect. Behavior is normal.       LAB:     All pertinent labs reviewed and interpreted.  Labs Ordered and Resulted from Time of ED Arrival to Time of ED Departure   BASIC METABOLIC PANEL - Abnormal       Result Value    Sodium 142      Potassium 4.1      Chloride 105      Carbon Dioxide (CO2) 29      Anion Gap 8      Urea Nitrogen 15      Creatinine 0.77      Calcium 8.4 (*)     Glucose 93      GFR Estimate 73     CBC WITH PLATELETS AND DIFFERENTIAL - Abnormal    WBC Count 4.5      RBC Count 3.64 (*)     Hemoglobin 12.7      Hematocrit 37.9       (*)     MCH 34.9 (*)     MCHC 33.5      RDW 12.2      Platelet Count 159      % Neutrophils 52      % Lymphocytes 32      % Monocytes 9      % Eosinophils 6      % Basophils 1      % Immature Granulocytes 0      NRBCs per 100 WBC 0      Absolute Neutrophils 2.4      Absolute Lymphocytes 1.5      Absolute Monocytes 0.4      Absolute Eosinophils 0.3      Absolute Basophils 0.0      Absolute Immature Granulocytes 0.0      Absolute NRBCs 0.0     TROPONIN I - Normal    Troponin I 0.01          RADIOLOGY:     Reviewed all pertinent imaging. Please see official radiology report.  XR Chest Port 1 View   Final Result   IMPRESSION: Lungs are grossly clear. No pleural effusion or pneumothorax. Mildly enlarged cardiac silhouette. Aortic atherosclerosis. No obvious acute fracture. Stimulator packs noted over the bilateral chest wall.            Cervical spine CT w/o  contrast   Preliminary Result   IMPRESSION:   1.  Cervical spondylosis.      2.  Negative for fracture or traumatic malalignment.            Head CT w/o contrast   Preliminary Result   IMPRESSION:   1.  Negative for acute intracranial process.      2.  Status post bilateral deep brain stimulator placement. Stable position.      3.  Large scalp hematoma without fracture or evidence for acute intracranial injury.              EKG:     EKG #1    Time:    Ventricular rate bmp  Axis   MS interval ms  QRS duration ms  QT/QTC ms    Compared to previous EKG on  I have independently reviewed and interpreted the EKG(s) documented above.      PROCEDURES:     Procedures      I, Chioma Shanks, am serving as a scribe to document services personally performed by Dr. Swanson based on my observation and the provider's statements to me. I, Danelle Swanson MD attest that Chioma Shanks is acting in a scribe capacity, has observed my performance of the services and has documented them in accordance with my direction.    Danelle Swanson M.D.  Emergency Medicine  [facility] EMERGENCY DEPARTMENT  [facility] [facility] [id number] [phone]  Dept: [phone]     Danelle Swanson MD  01/02/22 [facility]

## 2022-01-02 NOTE — ED NOTES
Discharged to dtr:       01/02/22 1036   Departure Condition   Departure Condition Stable   Mobility at Departure Wheelchair   Patient Teaching Discharge instructions reviewed and given;Follow-up care reviewed;Pain management discussed;Medications discussed;Patient / Caregiver verbalized understanding   Departure Mode By self;With family member   [place] Coma Scale   Best Eye Response 4-->(E4) spontaneous   Best Motor Response 6-->(M6) obeys commands   Best Verbal Response 5-->(V5) oriented   [place] Coma Scale Score 15

## 2022-01-02 NOTE — ED TRIAGE NOTES
"Presents via [facility] from private living alone for c/o fall that occurred this morning.  While trying to sit on the toilet, pt lost her balance and fell.  She hit her right forehead on floor.  Has a large hematoma to right forehead.  No thinners.  Denies thinners.  FIRE found her sitting upright with hematoma, alert and oriented, \"very active.\"  She ambulated at the scene with RW.  Denies neck/back pain.  Is [place], hearing aids are in purse.  Has not taken AM meds yet.      No signs of active bleed.    Pt is alert and oriented.  She is in NAD.  There is an obvious hematoma to right forehead.  Talking.  Appropriate.  No respiratory distress.  Sats 100%.  Has 2 pacemakers, to left and right chest.  No chest pain.  Skin is warm and pink.  There are no other signs of trauma.    "

## 2022-01-03 LAB
ATRIAL RATE - MUSE: 61 BPM
DIASTOLIC BLOOD PRESSURE - MUSE: 120 MMHG
INTERPRETATION ECG - MUSE: NORMAL
P AXIS - MUSE: 44 DEGREES
PR INTERVAL - MUSE: 178 MS
QRS DURATION - MUSE: 80 MS
QT - MUSE: 438 MS
QTC - MUSE: 440 MS
R AXIS - MUSE: 37 DEGREES
SYSTOLIC BLOOD PRESSURE - MUSE: 183 MMHG
T AXIS - MUSE: 63 DEGREES
VENTRICULAR RATE- MUSE: 61 BPM

## 2022-11-15 ENCOUNTER — LAB REQUISITION (OUTPATIENT)
Dept: LAB | Facility: CLINIC | Age: 87
End: 2022-11-15

## 2022-11-15 DIAGNOSIS — I10 ESSENTIAL (PRIMARY) HYPERTENSION: ICD-10-CM

## 2022-11-15 DIAGNOSIS — E78.5 HYPERLIPIDEMIA, UNSPECIFIED: ICD-10-CM

## 2022-11-15 DIAGNOSIS — E03.9 HYPOTHYROIDISM, UNSPECIFIED: ICD-10-CM

## 2022-11-15 LAB
ANION GAP SERPL CALCULATED.3IONS-SCNC: 9 MMOL/L (ref 7–15)
BUN SERPL-MCNC: 22.2 MG/DL (ref 8–23)
CALCIUM SERPL-MCNC: 8.7 MG/DL (ref 8.2–9.6)
CHLORIDE SERPL-SCNC: 105 MMOL/L (ref 98–107)
CHOLEST SERPL-MCNC: 146 MG/DL
CREAT SERPL-MCNC: 0.8 MG/DL (ref 0.51–0.95)
DEPRECATED HCO3 PLAS-SCNC: 29 MMOL/L (ref 22–29)
GFR SERPL CREATININE-BSD FRML MDRD: 70 ML/MIN/1.73M2
GLUCOSE SERPL-MCNC: 101 MG/DL (ref 70–99)
HDLC SERPL-MCNC: 49 MG/DL
LDLC SERPL CALC-MCNC: 80 MG/DL
NONHDLC SERPL-MCNC: 97 MG/DL
POTASSIUM SERPL-SCNC: 4.2 MMOL/L (ref 3.4–5.3)
SODIUM SERPL-SCNC: 143 MMOL/L (ref 136–145)
TRIGL SERPL-MCNC: 83 MG/DL
TSH SERPL DL<=0.005 MIU/L-ACNC: 3.49 UIU/ML (ref 0.3–4.2)

## 2022-11-15 PROCEDURE — 80048 BASIC METABOLIC PNL TOTAL CA: CPT | Performed by: FAMILY MEDICINE

## 2022-11-15 PROCEDURE — 84443 ASSAY THYROID STIM HORMONE: CPT | Performed by: FAMILY MEDICINE

## 2022-11-15 PROCEDURE — 80061 LIPID PANEL: CPT | Performed by: FAMILY MEDICINE

## 2022-11-19 ENCOUNTER — HEALTH MAINTENANCE LETTER (OUTPATIENT)
Age: 87
End: 2022-11-19

## 2022-12-18 ENCOUNTER — HEALTH MAINTENANCE LETTER (OUTPATIENT)
Age: 87
End: 2022-12-18

## 2023-05-03 PROBLEM — K21.9 GASTROESOPHAGEAL REFLUX DISEASE: Status: ACTIVE | Noted: 2023-05-03

## 2023-05-03 PROBLEM — C44.91 BASAL CELL CARCINOMA OF SKIN: Status: ACTIVE | Noted: 2023-05-03

## 2023-07-06 NOTE — PROGRESS NOTES
Diagnosis/Summary/Recommendations:    PATIENT: Jennifer Ferrera  89 year old female     : 1931    ALLY: 2023    MRN: [id number]  [address]   [facility] [address] [phone] (H)   [phone] (M)      Mary Ann Mijares  [phone]     Assessment:  (G25.0) Essential tremor  (primary encounter diagnosis)           Review of diagnosis     tremor     Avoidance of dopamine blockers    not using     Motor complication review         Review of Impulse control disorders         Review of surgical or medication options   DBS battery 2020 replacement     Gait/Balance/Falls         Exercise/Therapy performed/offered         Cognitive/Driving         Mood    Escitalopram lexapro 10mg      Hallucinations/delusions         Sleep    Sleep for 3 hours and then get up an gets up and goes  Back to bed.      Bladder/Renal/Prostate/Gyn/Other   Oxybutynin ditropan XL 5mg 24 hr tablet - not taking  Oxybutynin ditropan 5mg tablet  - not taking    GI/Constipation/GERD    has some issues and is on a probiotic  Not taking omeprazole     ENDO/Lipid/DM/Bone density/Thyroid   Cholecalciferol vitamin D3 25 mcg 1000 units  Ibandronate boniva 150mg - not clear if taking  Levothyroxine sodium 50mcg daily   Pravastatin pravachol 20mg at night     Cardio/heart/Hyper or Hypotensive    Atenolol tenormin - not taking  Furosemide lasix 40mg daily     Vision/Dry Eyes/Cataracts/Glaucoma/Macular    Taking ocuvite  Using refresh solution     Heme/Anticoagulation/Antiplatelet/Anemia/Other   Aspirin 81mg daily      ENT/Resp        Skin/Cancer/Seborrhea/other        Musculoskeletal/Pain/Headache        Other:   Aleve 220mg twice daily for arthritis      She is dealing with an infection of her leg.   She is on a topical antibiotic gentamicin and off oral medication.           Medications     6-9am 8am 8pm   ASpirin 81mg   1     Atenolol tenormin 50mg off       Carboxymethylcellulose refresh 1%   prn        Cholecalciferol vit d 1000 u 25 mcg   1     Escitalopram lexapro 10mg   1     Furosemide lasix 40mg   1     Ibandronate boniva 150mg off       Ketaconazole nizoral 2% shampoo off       Levothyroxine sodium 50mcg  1      Lisinopril zestril 5mg off       MVI ocuvite     1    Naproxen aleve 220mg   1 1   Pravastatin pravachol 20mg     1    Probiotic    1    Senna-docusate senokot S 8.6-50 no                               Plan:    Blood pressure is now 169/74 and 61 - recorded    Please see erica's note for details    Left side was 2.89  Right side was 2.87    Last changed 2020  May need to have replaced in 4-5 years    living by herself in [place]  She is eating microwaved foods  Healthy choice tv dinners  Oatmeal for breakfast  Orange slices for breakfast    Her chewing is not very good so has to puree everything   Because of her jaw infection       Medications     6-9am 8am 8pm   ASpirin 81mg   1     Carboxymethylcellulose refresh 1%   prn       Cholecalciferol vit d 1000 u 25 mcg   1     Escitalopram lexapro 10mg   1     Furosemide lasix 40mg   1     Levothyroxine sodium 50mcg  1      MVI ocuvite     1    Naproxen aleve 220mg   1 1   Pravastatin pravachol 20mg     1    Probiotic    1                                 Return in one year.     Has  A visiting aide for cleaning and trimming nails etc 3 days a week.   [employer] was there for monitoring her during her shower.   Has life line pendant - fall detection monitor but not sensitive enough.     Tremor is subtle today bilateral on posture and action.         Coding statement:   Medical Decision Making:  #  Chronic progressive medical conditions addressed  - see above --   Review and/or interpretation of unique test or documentation from a provider outside of neurology yes   Independent historian provided additional details  yes I  Prescription drug management and review of potential side effects and/or monitoring for side effects  -- see above  ---  Health impacted by social determinants of health  Yes - home bound.     I have reviewed the note as documented above.  This accurately captures the substance of my conversation with the patient and total time spent preparing for visit, executing visit and completing visit on the day of the visit:  20 minutes.  The portion of this total time included face to face time 17 minutes     Iban Neves MD     ______________________________________    Last visit date and details:             ______________________________________      Patient was asked about 14 Review of systems including changes in vision (dry eyes, double vision), hearing, heart, lungs, musculoskeletal, depression, anxiety, snoring, RBD, insomnia, urinary frequency, urinary urgency, constipation, swallowing problems, hematological, ID, allergies, skin problems: seborrhea, endocrinological: thyroid, diabetes, cholesterol; balance, weight changes, and other neurological problems and these were not significant at this time except for   Patient Active Problem List   Diagnosis     Tremor     S/P brain surgery     Lymphedema     Anxiety     PSEUDOBULBAR AFFECT     Wears glasses     Hearing loss     Urinary incontinence, urge     Urinary urgency     Memory loss     Skin cancer     Hypercholesterolemia     Nocturia     Mood change     Macular degeneration (senile) of retina     Recurrent falls     Osteoporosis     Dysarthria     Facial numbness     Tooth infection     Basal cell carcinoma of deltoid region, right     1st degree AV block     Low back pain     Bradycardia     Depression     Essential tremor     GERD (gastroesophageal reflux disease)     Osteomyelitis (H)     Post-operative state     Sinus tachycardia     Hyperlipidemia     End of battery life of deep brain stimulator     Status post deep brain stimulator placement     Acquired hypothyroidism     Aseptic necrosis of jaw (H)     Benign hypertension     Chronic pain disorder     Daytime somnolence      Obesity     Osteomyelitis of mandible     Perimenopausal disorder     Primary localized osteoarthritis of pelvic region and thigh     Senile osteoporosis     Spinal stenosis in cervical region     Urinary incontinence     Gastroesophageal reflux disease     Basal cell carcinoma of skin          Allergies   Allergen Reactions     Adhesive Tape      Other reaction(s): itching     Latex      Vioxx      Pt. Is unaware of any allergy to this med.     Past Surgical History:   Procedure Laterality Date     CHOLECYSTECTOMY       Excision of basal cell carcinoma from RUE  Sep 2015    right shoulder     EXTRACAPSULAR CATARACT EXTRATION WITH INTRAOCULAR LENS IMPLANT       EXTRACAPSULAR CATARACT EXTRATION WITH INTRAOCULAR LENS IMPLANT       HYSTERECTOMY       REPLACE DEEP BRAIN STIMULATION GENERATOR / BATTERY Bilateral 12/29/2020    Procedure: Replacement of deep brain stimulator generator/battery over bilateral chest wall  **Latex Allergy**;  Surgeon: Noe Collins MD;  Location: [facility] OR     REPLACE DEEP BRAIN STIMULATION GENERATOR / BATTERY BILATERAL Bilateral 11/8/2016    Procedure: REPLACE DEEP BRAIN STIMULATION GENERATOR / BATTERY BILATERAL;  Surgeon: Bentley Martinez MD;  Location: [facility] OR     REPLACE PULSE GENERATOR SUBCUTANEOUS BILATERAL  29 Jul 2008    Replaced both soletra     REPLACE PULSE GENERATOR SUBCUTANEOUS BILATERAL  7/2/2013    Procedure: REPLACE PULSE GENERATOR SUBCUTANEOUS BILATERAL;  Bilateral Deep Brain Stimulator Battery Exchange ;  Surgeon: Bentley Martinez MD;  Location: [facility] OR     Right DBS Lead removal  jan 2003    infected right lead removed; was in a nursing home on antiobiotics     STEREOTACTIC INSERTION DEEP BRAIN STIMULATION  12 May 2003    placed lead in may after removal of right thalamic  DBS lead due to infection feb 17 2003     STEREOTACTIC INSERTION DEEP BRAIN STIMULATION  9 jun 2003    revision of right DBS electrode     STEREOTACTIC INSERTION DEEP BRAIN STIMULATION   oct 2002    left thalamic DBS lead placed     STEREOTACTIC INSERTION DEEP BRAIN STIMULATION  oct 2002    right thalamic DBS lead placed     Past Medical History:   Diagnosis Date     1st degree AV block 10/20/2015    ECG shows mild sinus bradycardia with 1st degree heart block and min voltage criteria for LVH; borderline ECG     Anxiety      Dysarthria      Facial numbness      Hearing loss      Hypercholesterolemia      Hypertension      Low back pain      Lymphedema      Macular degeneration (senile) of retina      Memory loss      Mood change      Osteoporosis, unspecified      PSEUDOBULBAR AFFECT      Recurrent falls      Skin cancer      Tremor      Urinary incontinence, urge      Urinary urgency      Wears glasses      Social History     Socioeconomic History     Marital status:      Spouse name: Not on file     Number of children: Not on file     Years of education: Not on file     Highest education level: Not on file   Occupational History     Not on file   Tobacco Use     Smoking status: Never     Smokeless tobacco: Never   Substance and Sexual Activity     Alcohol use: No     Drug use: No     Sexual activity: Not on file   Other Topics Concern     Parent/sibling w/ CABG, MI or angioplasty before 65F 55M? Not Asked   Social History Narrative    lives in [place]. . Daughter Mary Ann Mijares.     Social Determinants of Health     Financial Resource Strain: Not on file   Food Insecurity: Not on file   Transportation Needs: Not on file   Physical Activity: Not on file   Stress: Not on file   Social Connections: Not on file   Intimate Partner Violence: Not on file   Housing Stability: Not on file       Drug and lactation database from the [place] [employer]:  http://toxnet.nlm.nih.gov/cgi-bin/sis/htmlgen?LACT      B/P: Data Unavailable, T: Data Unavailable, P: Data Unavailable, R: Data Unavailable 0 lbs 0 oz  not currently breastfeeding., There is no height or weight on  file to calculate BMI.  Medications and Vitals not listed above were documented in the cart and reviewed by me.     Current Outpatient Medications   Medication Sig Dispense Refill     Cholecalciferol (VITAMIN D3) 25 MCG (1000 UT) CAPS TAKE 1 CAPSULE BY MOUTH DAILY 90 capsule 3     ASPIRIN 81 PO Take 1 tablet by mouth daily       carboxymethylcellulose (REFRESH) 1 % ophthalmic solution Place 1 drop into both eyes 2 times daily as needed 1 Bottle      escitalopram (LEXAPRO) 10 MG tablet Take 10 mg by mouth every morning        furosemide (LASIX) 40 MG tablet every morning 40mg tab by mouth daily  1     levothyroxine (SYNTHROID/LEVOTHROID) 50 MCG tablet Take 50 mcg by mouth every morning        lisinopril (ZESTRIL) 5 MG tablet Dose?       Multiple Vitamins-Minerals (OCUVITE PRESERVISION PO) Take 1 tablet by mouth every evening        naproxen sodium (ALEVE) 220 MG capsule Dose?       pravastatin (PRAVACHOL) 20 MG tablet Take 20 mg by mouth every evening  90 tablet 3     Probiotic Product (PROBIOTIC-10 PO) Take by mouth every morning        SENNA-docusate sodium (SENNA S) 8.6-50 MG tablet Take 1 tablet by mouth At Bedtime 30 tablet 3         Iban Neves MD

## 2023-07-13 ENCOUNTER — TELEPHONE (OUTPATIENT)
Dept: NEUROLOGY | Facility: CLINIC | Age: 88
End: 2023-07-13

## 2023-07-13 ENCOUNTER — OFFICE VISIT (OUTPATIENT)
Dept: NEUROLOGY | Facility: CLINIC | Age: 88
End: 2023-07-13
Payer: COMMERCIAL

## 2023-07-13 VITALS — OXYGEN SATURATION: 98 % | SYSTOLIC BLOOD PRESSURE: 169 MMHG | HEART RATE: 61 BPM | DIASTOLIC BLOOD PRESSURE: 74 MMHG

## 2023-07-13 DIAGNOSIS — G25.0 ESSENTIAL TREMOR: Primary | ICD-10-CM

## 2023-07-13 DIAGNOSIS — Z79.52 LONG TERM CURRENT USE OF SYSTEMIC STEROIDS: ICD-10-CM

## 2023-07-13 PROCEDURE — 99213 OFFICE O/P EST LOW 20 MIN: CPT | Performed by: PSYCHIATRY & NEUROLOGY

## 2023-07-13 ASSESSMENT — PAIN SCALES - GENERAL: PAINLEVEL: NO PAIN (0)

## 2023-07-13 NOTE — TELEPHONE ENCOUNTER
[facility] Call Center    Phone Message    May a detailed message be left on voicemail: yes     Reason for Call: Other:  Mary Ann called to speak with care team in regards to Pt's DBS.  Mary Ann states she checked once they got home and Pt's Lt side is at 2.89 and Rt side is 2.4.  Mary Ann states that she was to call if it was below 2.5.    Please call Mary Ann at [phone] to advise.    Action Taken: Message routed to:  [facility] ([facility]): Neurology    Travel Screening: Not Applicable

## 2023-07-13 NOTE — LETTER
2023       RE: Jennifer Ferrera  [address]  [facility] [address]     Dear Colleague,    Thank you for referring your patient, Jennifer Ferrera, to the [facility] [place] at [facility]. Please see a copy of my visit note below.        Diagnosis/Summary/Recommendations:    PATIENT: Jennifer Ferrera  89 year old female     : 1931    ALLY: 2023    MRN: [id number]  [address]   [facility] [address] [phone] (H)   [phone] (M)      Mary Ann Mijares  [phone]     Assessment:  (G25.0) Essential tremor  (primary encounter diagnosis)           Review of diagnosis     tremor     Avoidance of dopamine blockers    not using     Motor complication review         Review of Impulse control disorders         Review of surgical or medication options   DBS battery 2020 replacement     Gait/Balance/Falls         Exercise/Therapy performed/offered         Cognitive/Driving         Mood    Escitalopram lexapro 10mg      Hallucinations/delusions         Sleep    Sleep for 3 hours and then get up an gets up and goes  Back to bed.      Bladder/Renal/Prostate/Gyn/Other   Oxybutynin ditropan XL 5mg 24 hr tablet - not taking  Oxybutynin ditropan 5mg tablet  - not taking    GI/Constipation/GERD    has some issues and is on a probiotic  Not taking omeprazole     ENDO/Lipid/DM/Bone density/Thyroid   Cholecalciferol vitamin D3 25 mcg 1000 units  Ibandronate boniva 150mg - not clear if taking  Levothyroxine sodium 50mcg daily   Pravastatin pravachol 20mg at night     Cardio/heart/Hyper or Hypotensive    Atenolol tenormin - not taking  Furosemide lasix 40mg daily     Vision/Dry Eyes/Cataracts/Glaucoma/Macular    Taking ocuvite  Using refresh solution     Heme/Anticoagulation/Antiplatelet/Anemia/Other   Aspirin 81mg daily      ENT/Resp        Skin/Cancer/Seborrhea/other        Musculoskeletal/Pain/Headache         Other:   Aleve 220mg twice daily for arthritis      She is dealing with an infection of her leg.   She is on a topical antibiotic gentamicin and off oral medication.           Medications     6-9am 8am 8pm   ASpirin 81mg   1     Atenolol tenormin 50mg off       Carboxymethylcellulose refresh 1%   prn       Cholecalciferol vit d 1000 u 25 mcg   1     Escitalopram lexapro 10mg   1     Furosemide lasix 40mg   1     Ibandronate boniva 150mg off       Ketaconazole nizoral 2% shampoo off       Levothyroxine sodium 50mcg  1      Lisinopril zestril 5mg off       MVI ocuvite     1    Naproxen aleve 220mg   1 1   Pravastatin pravachol 20mg     1    Probiotic    1    Senna-docusate senokot S 8.6-50 no                               Plan:    Blood pressure is now 169/74 and 61 - recorded    Please see erica's note for details    Left side was 2.89  Right side was 2.87    Last changed 2020  May need to have replaced in 4-5 years    living by herself in [place]  She is eating microwaved foods  Healthy choice tv dinners  Oatmeal for breakfast  Orange slices for breakfast    Her chewing is not very good so has to puree everything   Because of her jaw infection       Medications     6-9am 8am 8pm   ASpirin 81mg   1     Carboxymethylcellulose refresh 1%   prn       Cholecalciferol vit d 1000 u 25 mcg   1     Escitalopram lexapro 10mg   1     Furosemide lasix 40mg   1     Levothyroxine sodium 50mcg  1      MVI ocuvite     1    Naproxen aleve 220mg   1 1   Pravastatin pravachol 20mg     1    Probiotic    1                                 Return in one year.     Has  A visiting aide for cleaning and trimming nails etc 3 days a week.   [employer] was there for monitoring her during her shower.   Has life line pendant - fall detection monitor but not sensitive enough.     Tremor is subtle today bilateral on posture and action.         Coding statement:   Medical Decision Making:  #  Chronic progressive medical conditions  addressed  - see above --   Review and/or interpretation of unique test or documentation from a provider outside of neurology yes   Independent historian provided additional details  yes I  Prescription drug management and review of potential side effects and/or monitoring for side effects  -- see above ---  Health impacted by social determinants of health  Yes - home bound.     I have reviewed the note as documented above.  This accurately captures the substance of my conversation with the patient and total time spent preparing for visit, executing visit and completing visit on the day of the visit:  20 minutes.  The portion of this total time included face to face time 17 minutes     Iban Neves MD     ______________________________________    Last visit date and details:             ______________________________________      Patient was asked about 14 Review of systems including changes in vision (dry eyes, double vision), hearing, heart, lungs, musculoskeletal, depression, anxiety, snoring, RBD, insomnia, urinary frequency, urinary urgency, constipation, swallowing problems, hematological, ID, allergies, skin problems: seborrhea, endocrinological: thyroid, diabetes, cholesterol; balance, weight changes, and other neurological problems and these were not significant at this time except for   Patient Active Problem List   Diagnosis    Tremor    S/P brain surgery    Lymphedema    Anxiety    PSEUDOBULBAR AFFECT    Wears glasses    Hearing loss    Urinary incontinence, urge    Urinary urgency    Memory loss    Skin cancer    Hypercholesterolemia    Nocturia    Mood change    Macular degeneration (senile) of retina    Recurrent falls    Osteoporosis    Dysarthria    Facial numbness    Tooth infection    Basal cell carcinoma of deltoid region, right    1st degree AV block    Low back pain    Bradycardia    Depression    Essential tremor    GERD (gastroesophageal reflux disease)    Osteomyelitis (H)    Post-operative  state    Sinus tachycardia    Hyperlipidemia    End of battery life of deep brain stimulator    Status post deep brain stimulator placement    Acquired hypothyroidism    Aseptic necrosis of jaw (H)    Benign hypertension    Chronic pain disorder    Daytime somnolence    Obesity    Osteomyelitis of mandible    Perimenopausal disorder    Primary localized osteoarthritis of pelvic region and thigh    Senile osteoporosis    Spinal stenosis in cervical region    Urinary incontinence    Gastroesophageal reflux disease    Basal cell carcinoma of skin          Allergies   Allergen Reactions    Adhesive Tape      Other reaction(s): itching    Latex     Vioxx      Pt. Is unaware of any allergy to this med.     Past Surgical History:   Procedure Laterality Date    CHOLECYSTECTOMY      Excision of basal cell carcinoma from RUE  Sep 2015    right shoulder    EXTRACAPSULAR CATARACT EXTRATION WITH INTRAOCULAR LENS IMPLANT      EXTRACAPSULAR CATARACT EXTRATION WITH INTRAOCULAR LENS IMPLANT      HYSTERECTOMY      REPLACE DEEP BRAIN STIMULATION GENERATOR / BATTERY Bilateral 12/29/2020    Procedure: Replacement of deep brain stimulator generator/battery over bilateral chest wall  **Latex Allergy**;  Surgeon: Noe Collins MD;  Location: [facility] OR    REPLACE DEEP BRAIN STIMULATION GENERATOR / BATTERY BILATERAL Bilateral 11/8/2016    Procedure: REPLACE DEEP BRAIN STIMULATION GENERATOR / BATTERY BILATERAL;  Surgeon: Bentley Martinez MD;  Location: [facility] OR    REPLACE PULSE GENERATOR SUBCUTANEOUS BILATERAL  29 Jul 2008    Replaced both soletra    REPLACE PULSE GENERATOR SUBCUTANEOUS BILATERAL  7/2/2013    Procedure: REPLACE PULSE GENERATOR SUBCUTANEOUS BILATERAL;  Bilateral Deep Brain Stimulator Battery Exchange ;  Surgeon: Bentley Martinez MD;  Location: [facility] OR    Right DBS Lead removal  jan 2003    infected right lead removed; was in a nursing home on antiobiotics    STEREOTACTIC INSERTION DEEP BRAIN STIMULATION  12 May  2003    placed lead in may after removal of right thalamic  DBS lead due to infection feb 17 2003    STEREOTACTIC INSERTION DEEP BRAIN STIMULATION  9 jun 2003    revision of right DBS electrode    STEREOTACTIC INSERTION DEEP BRAIN STIMULATION  oct 2002    left thalamic DBS lead placed    STEREOTACTIC INSERTION DEEP BRAIN STIMULATION  oct 2002    right thalamic DBS lead placed     Past Medical History:   Diagnosis Date    1st degree AV block 10/20/2015    ECG shows mild sinus bradycardia with 1st degree heart block and min voltage criteria for LVH; borderline ECG    Anxiety     Dysarthria     Facial numbness     Hearing loss     Hypercholesterolemia     Hypertension     Low back pain     Lymphedema     Macular degeneration (senile) of retina     Memory loss     Mood change     Osteoporosis, unspecified     PSEUDOBULBAR AFFECT     Recurrent falls     Skin cancer     Tremor     Urinary incontinence, urge     Urinary urgency     Wears glasses      Social History     Socioeconomic History    Marital status:      Spouse name: Not on file    Number of children: Not on file    Years of education: Not on file    Highest education level: Not on file   Occupational History    Not on file   Tobacco Use    Smoking status: Never    Smokeless tobacco: Never   Substance and Sexual Activity    Alcohol use: No    Drug use: No    Sexual activity: Not on file   Other Topics Concern    Parent/sibling w/ CABG, MI or angioplasty before 65F 55M? Not Asked   Social History Narrative    lives in [place]. . Daughter Mary Ann Mijares.     Social Determinants of Health     Financial Resource Strain: Not on file   Food Insecurity: Not on file   Transportation Needs: Not on file   Physical Activity: Not on file   Stress: Not on file   Social Connections: Not on file   Intimate Partner Violence: Not on file   Housing Stability: Not on file       Drug and lactation database from the [employer]  [employer]:  http://toxnet.nlm.nih.gov/cgi-bin/sis/htmlgen?LACT      B/P: Data Unavailable, T: Data Unavailable, P: Data Unavailable, R: Data Unavailable 0 lbs 0 oz  not currently breastfeeding., There is no height or weight on file to calculate BMI.  Medications and Vitals not listed above were documented in the cart and reviewed by me.     Current Outpatient Medications   Medication Sig Dispense Refill    Cholecalciferol (VITAMIN D3) 25 MCG (1000 UT) CAPS TAKE 1 CAPSULE BY MOUTH DAILY 90 capsule 3    ASPIRIN 81 PO Take 1 tablet by mouth daily      carboxymethylcellulose (REFRESH) 1 % ophthalmic solution Place 1 drop into both eyes 2 times daily as needed 1 Bottle     escitalopram (LEXAPRO) 10 MG tablet Take 10 mg by mouth every morning       furosemide (LASIX) 40 MG tablet every morning 40mg tab by mouth daily  1    levothyroxine (SYNTHROID/LEVOTHROID) 50 MCG tablet Take 50 mcg by mouth every morning       lisinopril (ZESTRIL) 5 MG tablet Dose?      Multiple Vitamins-Minerals (OCUVITE PRESERVISION PO) Take 1 tablet by mouth every evening       naproxen sodium (ALEVE) 220 MG capsule Dose?      pravastatin (PRAVACHOL) 20 MG tablet Take 20 mg by mouth every evening  90 tablet 3    Probiotic Product (PROBIOTIC-10 PO) Take by mouth every morning       SENNA-docusate sodium (SENNA S) 8.6-50 MG tablet Take 1 tablet by mouth At Bedtime 30 tablet 3         Iban Neves MD

## 2023-07-13 NOTE — PATIENT INSTRUCTIONS
Medications     6-9am 8am 8pm   ASpirin 81mg   1     Atenolol tenormin 50mg off       Carboxymethylcellulose refresh 1%   prn       Cholecalciferol vit d 1000 u 25 mcg   1     Escitalopram lexapro 10mg   1     Furosemide lasix 40mg   1     Ibandronate boniva 150mg off       Ketaconazole nizoral 2% shampoo off       Levothyroxine sodium 50mcg  1      Lisinopril zestril 5mg off       MVI ocuvite     1    Naproxen aleve 220mg   1 1   Pravastatin pravachol 20mg     1    Probiotic    1    Senna-docusate senokot S 8.6-50 no

## 2023-07-14 ENCOUNTER — ALLIED HEALTH/NURSE VISIT (OUTPATIENT)
Dept: NEUROLOGY | Facility: CLINIC | Age: 88
End: 2023-07-14
Payer: COMMERCIAL

## 2023-07-14 DIAGNOSIS — G25.0 ESSENTIAL TREMOR: Primary | ICD-10-CM

## 2023-07-14 PROCEDURE — 95970 ALYS NPGT W/O PRGRMG: CPT

## 2023-07-14 NOTE — PROGRESS NOTES
Jennifer comes into clinic today at the request of Dr. Neves, ordering provider for follow-up visit to further evaluate her Deep Brain Stimulation systems. This service provided today was under the supervising provider of the day Dr. Neves, who was available if needed.     Reason for visit: DEEP BRAIN STIMULATION Interrogation   Time spent on visit: 10 min    Mary Ann Chapman RN    Movement Disorder Care Coordinator  [facility]  -------------    Interrogated patient's DBS Medtronic Activa SC batteries. Patient has BVIM. All impedances were checked and were WNL. See neuromodulation sheets scanned. Patient informed.    LVIM Therapy impedance = 496 ohms, 4.7 mA    Contacts used = C+ 0-1-   (double monopolar)  Current Setting = 2.4 V, 60 PW, 180 hz    Patient   Upper limit = 2.8 V  Lower limit = 2.2 V    Model 20939    Battery voltage 2.89  Program used A    RVIM Therapy impedance = 916 ohms, 3.3 mA    Contacts used = 3+1-   (monopolar)  Current Setting = 3.0 V, 90 PW, 130 Hz    Model 48589    Battery voltage 2.87  Program used A    Instructed patient and daughter to check batteries monthly and when they see 2.7 they should call and get scheduled for a battery replacement consult. They verbalized understanding and agreement with this plan.    Medtronic Activa PC and SC  2.7 schedule consult for replacement surgery  2.6 PRABHAKAR   2.2 EOS (End of Service)

## 2023-07-14 NOTE — TELEPHONE ENCOUNTER
Mary Ann Mijares was in the office visit yesterday and spoke with Mary Ann Chapman, RN about checking the batteries monthly. Yesterdays readings were: Left side was 2.89 and Right side was 2.87. She checked it again and saw 2.89 on the left side but 2.4 on the right side. Discussed the 2.4 reading is likely her setting rather than the actual battery level. Reviewed PRABHAKAR and EOS signs and to call when the battery levels are below 2.7. She will check her battery again and asks how to switch between batteries with the patient .

## 2023-11-14 ENCOUNTER — LAB REQUISITION (OUTPATIENT)
Dept: LAB | Facility: CLINIC | Age: 88
End: 2023-11-14

## 2023-11-14 DIAGNOSIS — E78.5 HYPERLIPIDEMIA, UNSPECIFIED: ICD-10-CM

## 2023-11-14 DIAGNOSIS — E03.9 HYPOTHYROIDISM, UNSPECIFIED: ICD-10-CM

## 2023-11-14 DIAGNOSIS — R41.3 OTHER AMNESIA: ICD-10-CM

## 2023-11-14 DIAGNOSIS — I10 ESSENTIAL (PRIMARY) HYPERTENSION: ICD-10-CM

## 2023-11-14 LAB
ANION GAP SERPL CALCULATED.3IONS-SCNC: 14 MMOL/L (ref 7–15)
BUN SERPL-MCNC: 15.8 MG/DL (ref 8–23)
CALCIUM SERPL-MCNC: 9.1 MG/DL (ref 8.2–9.6)
CHLORIDE SERPL-SCNC: 104 MMOL/L (ref 98–107)
CHOLEST SERPL-MCNC: 150 MG/DL
CREAT SERPL-MCNC: 0.86 MG/DL (ref 0.51–0.95)
DEPRECATED HCO3 PLAS-SCNC: 24 MMOL/L (ref 22–29)
EGFRCR SERPLBLD CKD-EPI 2021: 63 ML/MIN/1.73M2
GLUCOSE SERPL-MCNC: 97 MG/DL (ref 70–99)
HDLC SERPL-MCNC: 49 MG/DL
LDLC SERPL CALC-MCNC: 88 MG/DL
NONHDLC SERPL-MCNC: 101 MG/DL
POTASSIUM SERPL-SCNC: 4.4 MMOL/L (ref 3.4–5.3)
SODIUM SERPL-SCNC: 142 MMOL/L (ref 135–145)
TRIGL SERPL-MCNC: 66 MG/DL
TSH SERPL DL<=0.005 MIU/L-ACNC: 4.65 UIU/ML (ref 0.3–4.2)
VIT B12 SERPL-MCNC: 795 PG/ML (ref 232–1245)

## 2023-11-14 PROCEDURE — 82607 VITAMIN B-12: CPT | Performed by: FAMILY MEDICINE

## 2023-11-14 PROCEDURE — 80048 BASIC METABOLIC PNL TOTAL CA: CPT | Performed by: FAMILY MEDICINE

## 2023-11-14 PROCEDURE — 84443 ASSAY THYROID STIM HORMONE: CPT | Performed by: FAMILY MEDICINE

## 2023-11-14 PROCEDURE — 80061 LIPID PANEL: CPT | Performed by: FAMILY MEDICINE

## 2024-01-27 ENCOUNTER — HEALTH MAINTENANCE LETTER (OUTPATIENT)
Age: 89
End: 2024-01-27

## 2024-05-09 ENCOUNTER — LAB REQUISITION (OUTPATIENT)
Dept: LAB | Facility: CLINIC | Age: 89
End: 2024-05-09

## 2024-05-09 DIAGNOSIS — E03.9 HYPOTHYROIDISM, UNSPECIFIED: ICD-10-CM

## 2024-05-09 PROCEDURE — 84443 ASSAY THYROID STIM HORMONE: CPT | Performed by: FAMILY MEDICINE

## 2024-05-10 LAB — TSH SERPL DL<=0.005 MIU/L-ACNC: 7.69 UIU/ML (ref 0.3–4.2)

## 2024-06-19 PROBLEM — R00.1 BRADYCARDIA, UNSPECIFIED: Status: ACTIVE | Noted: 2019-06-20

## 2024-06-19 PROBLEM — K21.9 GASTRO-ESOPHAGEAL REFLUX DISEASE WITHOUT ESOPHAGITIS: Status: ACTIVE | Noted: 2019-06-20

## 2024-06-19 PROBLEM — R00.0 TACHYCARDIA, UNSPECIFIED: Status: ACTIVE | Noted: 2019-06-20

## 2024-06-19 PROBLEM — E78.5 HYPERLIPIDEMIA, UNSPECIFIED: Status: ACTIVE | Noted: 2019-06-20

## 2024-06-19 PROBLEM — I10 ESSENTIAL (PRIMARY) HYPERTENSION: Status: ACTIVE | Noted: 2019-06-20

## 2024-06-19 PROBLEM — M86.9 OSTEOMYELITIS, UNSPECIFIED (H): Status: ACTIVE | Noted: 2019-06-20

## 2024-06-19 PROBLEM — F32.9 MAJOR DEPRESSIVE DISORDER, SINGLE EPISODE, UNSPECIFIED: Status: ACTIVE | Noted: 2019-06-20

## 2024-06-19 PROBLEM — M19.90 UNSPECIFIED OSTEOARTHRITIS, UNSPECIFIED SITE: Status: ACTIVE | Noted: 2019-06-20

## 2024-10-02 ENCOUNTER — LAB REQUISITION (OUTPATIENT)
Dept: LAB | Facility: CLINIC | Age: 89
End: 2024-10-02

## 2024-10-02 DIAGNOSIS — R60.0 LOCALIZED EDEMA: ICD-10-CM

## 2024-10-02 LAB
ANION GAP SERPL CALCULATED.3IONS-SCNC: 11 MMOL/L (ref 7–15)
BUN SERPL-MCNC: 24.4 MG/DL (ref 8–23)
CALCIUM SERPL-MCNC: 8.9 MG/DL (ref 8.8–10.4)
CHLORIDE SERPL-SCNC: 103 MMOL/L (ref 98–107)
CREAT SERPL-MCNC: 0.86 MG/DL (ref 0.51–0.95)
EGFRCR SERPLBLD CKD-EPI 2021: 63 ML/MIN/1.73M2
GLUCOSE SERPL-MCNC: 98 MG/DL (ref 70–99)
HCO3 SERPL-SCNC: 27 MMOL/L (ref 22–29)
POTASSIUM SERPL-SCNC: 4 MMOL/L (ref 3.4–5.3)
SODIUM SERPL-SCNC: 141 MMOL/L (ref 135–145)

## 2024-10-02 PROCEDURE — 80048 BASIC METABOLIC PNL TOTAL CA: CPT

## 2024-10-02 PROCEDURE — 82947 ASSAY GLUCOSE BLOOD QUANT: CPT

## 2024-10-09 ENCOUNTER — TELEPHONE (OUTPATIENT)
Dept: NEUROLOGY | Facility: CLINIC | Age: 89
End: 2024-10-09
Payer: COMMERCIAL

## 2024-10-09 NOTE — TELEPHONE ENCOUNTER
Patient confirmed scheduled appointment:  Date: 12/19/2024  Time: 12:40pm  Visit type: Return Movement Disorder  Provider: Pura  [place] [facility] [place]: [place]  Additional notes: Tremor follow up    Catherine Couch on 10/9/2024 at 1:18 PM

## 2024-10-09 NOTE — TELEPHONE ENCOUNTER
[facility] Call Center    Phone Message    May a detailed message be left on voicemail: yes     Reason for Call: Other:       Patient's daughter, Mary Ann, calling to schedule follow up visit with Dr Neves after missing an appointment on 07/16/2024. Per protocols for DBS patients, sending TE to clinic for assistance with scheduling. Requesting call back to #[phone] for scheduling     Action Taken: Message routed to:  [facility] Neurology    Travel Screening: Not Applicable

## 2024-11-05 ENCOUNTER — LAB REQUISITION (OUTPATIENT)
Dept: LAB | Facility: CLINIC | Age: 89
End: 2024-11-05

## 2024-11-05 DIAGNOSIS — E03.9 HYPOTHYROIDISM, UNSPECIFIED: ICD-10-CM

## 2024-11-05 DIAGNOSIS — E78.5 HYPERLIPIDEMIA, UNSPECIFIED: ICD-10-CM

## 2024-11-05 LAB
CHOLEST SERPL-MCNC: 141 MG/DL
FASTING STATUS PATIENT QL REPORTED: ABNORMAL
HDLC SERPL-MCNC: 49 MG/DL
LDLC SERPL CALC-MCNC: 83 MG/DL
NONHDLC SERPL-MCNC: 92 MG/DL
TRIGL SERPL-MCNC: 45 MG/DL
TSH SERPL DL<=0.005 MIU/L-ACNC: 3.45 UIU/ML (ref 0.3–4.2)

## 2024-11-05 PROCEDURE — 84443 ASSAY THYROID STIM HORMONE: CPT | Performed by: FAMILY MEDICINE

## 2024-11-05 PROCEDURE — 82465 ASSAY BLD/SERUM CHOLESTEROL: CPT | Performed by: FAMILY MEDICINE

## 2024-12-19 ENCOUNTER — TELEPHONE (OUTPATIENT)
Dept: NEUROLOGY | Facility: CLINIC | Age: 89
End: 2024-12-19

## 2024-12-19 NOTE — TELEPHONE ENCOUNTER
[facility] Call Center    Phone Message    May a detailed message be left on voicemail: yes     Reason for Call:     Mary Ann rae daughter called states that patient does not want to travel in this weather to attend her appt for DBS follow up for her battery, please call back to assist with rescheduling.     Action Taken: Message routed to:  [facility] neurology     Travel Screening: Not Applicable     Date of Service:

## 2024-12-19 NOTE — TELEPHONE ENCOUNTER
Sent Mychart (1st Attempt) for the patient to call back and schedule the following:    Appointment type: Return Movement  Provider: Pura  Return date: next available  Specialty phone number: [phone]  Additional appointment(s) needed: NA  Additonal Notes: reschedule from 12/19/24 appt.     Meena Foster on 12/19/2024 at 9:59 AM

## 2024-12-23 ENCOUNTER — TELEPHONE (OUTPATIENT)
Dept: NEUROLOGY | Facility: CLINIC | Age: 89
End: 2024-12-23
Payer: COMMERCIAL

## 2024-12-23 NOTE — TELEPHONE ENCOUNTER
Voicemail box full, letter sent to the patient to call back and schedule the following:    Appointment type: Return Movement  Provider: Luis  Return date: next available  Specialty phone number: [phone]  Additional appointment(s) needed: NA  Additonal Notes: follow up    Meena Foster on 12/23/2024 at 10:17 AM

## 2025-01-06 ENCOUNTER — TELEPHONE (OUTPATIENT)
Dept: NEUROLOGY | Facility: CLINIC | Age: OVER 89
End: 2025-01-06
Payer: COMMERCIAL

## 2025-01-06 NOTE — TELEPHONE ENCOUNTER
Spoke to Mary Ann and the patient was scheduled on 1/20/25 at 11:15 AM with Dr. Collins. Mary Ann is aware this is just for the consult and not the surgery. Mary Ann asked who was on the patient's authorization for Protected Health Information. Mary Ann was informed there is none on file that the write was able to locate and that one can be signed by the patient when she comes in for her appointment on 1/20/25. A note was added for the rooming staff to gather this signed consent.    Mary Ann stated her brother will be bringing the patient as Mary Ann is going on vacation on 1/13/25 and won't be able to attend this appointment with the the patient on 1/20.

## 2025-01-06 NOTE — TELEPHONE ENCOUNTER
RECORDS RECEIVED FROM: Internal    REASON FOR VISIT: DBS battery replacement consult.   PROVIDER: Noe Collins MD   DATE OF APPT: 1/20/25 @ 11:15 am    NOTES (FOR ALL VISITS) STATUS DETAILS   OFFICE NOTE from referring provider Internal 12/27/24 (Phone Note), 7/13/23, 5/11/23 [facility]    See Additional Encounters   OFFICE NOTE from other specialist Internal 7/14/23 Mary Ann Chapman RN [facility]    1/18/21, 12/21/20 [facility] Collins [facility]     DISCHARGE SUMMARY from hospital Internal 12/29/20 Noe Collins MD @[facility]     OPERATIVE REPORT Internal 12/29/20 Noe Collins MD @[facility] Replacement of deep brain stimulator generator/battery over bilateral chest wall  **Latex Allergy**     11/8/16 Bentley Martinez MD @[facility] Bilateral Deep Brain Stimulator generator Replacement     7/2/13 Bentley Martinez MD @[facility] Bilateral Deep Brain Stimulator Battery Exchange      MEDICATION LIST Internal    IMAGING  (FOR ALL VISITS)     CT (HEAD, NECK, SPINE) Internal [facility]  1/2/22 CT Cervical Spine  1/2/22 CT Head  3/15/16 CT Thoracic Spine  2/7/14 CT Cervical Spine  2/7/14 CT Head

## 2025-01-20 ENCOUNTER — LAB (OUTPATIENT)
Dept: LAB | Facility: CLINIC | Age: OVER 89
End: 2025-01-20
Payer: COMMERCIAL

## 2025-01-20 ENCOUNTER — PRE VISIT (OUTPATIENT)
Dept: NEUROSURGERY | Facility: CLINIC | Age: OVER 89
End: 2025-01-20

## 2025-01-20 ENCOUNTER — OFFICE VISIT (OUTPATIENT)
Dept: NEUROSURGERY | Facility: CLINIC | Age: OVER 89
End: 2025-01-20
Payer: COMMERCIAL

## 2025-01-20 VITALS
WEIGHT: 177 LBS | HEART RATE: 68 BPM | BODY MASS INDEX: 30.22 KG/M2 | DIASTOLIC BLOOD PRESSURE: 71 MMHG | SYSTOLIC BLOOD PRESSURE: 150 MMHG | RESPIRATION RATE: 16 BRPM | HEIGHT: 64 IN | OXYGEN SATURATION: 97 %

## 2025-01-20 DIAGNOSIS — Z01.818 PREOPERATIVE EVALUATION TO RULE OUT SURGICAL CONTRAINDICATION: Primary | ICD-10-CM

## 2025-01-20 DIAGNOSIS — Z01.818 PREOPERATIVE EVALUATION TO RULE OUT SURGICAL CONTRAINDICATION: ICD-10-CM

## 2025-01-20 DIAGNOSIS — R79.1 ABNORMAL COAGULATION PROFILE: ICD-10-CM

## 2025-01-20 DIAGNOSIS — Z96.89 STATUS POST DEEP BRAIN STIMULATOR PLACEMENT: ICD-10-CM

## 2025-01-20 DIAGNOSIS — Z45.42 END OF BATTERY LIFE OF DEEP BRAIN STIMULATOR: ICD-10-CM

## 2025-01-20 DIAGNOSIS — R25.1 TREMOR: Primary | ICD-10-CM

## 2025-01-20 LAB
ALBUMIN UR-MCNC: NEGATIVE MG/DL
ANION GAP SERPL CALCULATED.3IONS-SCNC: 9 MMOL/L (ref 7–15)
APPEARANCE UR: CLEAR
APTT PPP: 32 SECONDS (ref 22–38)
BILIRUB UR QL STRIP: NEGATIVE
BUN SERPL-MCNC: 19.4 MG/DL (ref 8–23)
CALCIUM SERPL-MCNC: 9.4 MG/DL (ref 8.8–10.4)
CHLORIDE SERPL-SCNC: 103 MMOL/L (ref 98–107)
COLOR UR AUTO: NORMAL
CREAT SERPL-MCNC: 0.93 MG/DL (ref 0.51–0.95)
EGFRCR SERPLBLD CKD-EPI 2021: 57 ML/MIN/1.73M2
ERYTHROCYTE [DISTWIDTH] IN BLOOD BY AUTOMATED COUNT: 13.3 % (ref 10–15)
GLUCOSE SERPL-MCNC: 105 MG/DL (ref 70–99)
GLUCOSE UR STRIP-MCNC: NEGATIVE MG/DL
HCO3 SERPL-SCNC: 29 MMOL/L (ref 22–29)
HCT VFR BLD AUTO: 41.4 % (ref 35–47)
HGB BLD-MCNC: 13.5 G/DL (ref 11.7–15.7)
HGB UR QL STRIP: NEGATIVE
INR PPP: 1.01 (ref 0.85–1.15)
KETONES UR STRIP-MCNC: NEGATIVE MG/DL
LEUKOCYTE ESTERASE UR QL STRIP: NEGATIVE
MCH RBC QN AUTO: 32.8 PG (ref 26.5–33)
MCHC RBC AUTO-ENTMCNC: 32.6 G/DL (ref 31.5–36.5)
MCV RBC AUTO: 101 FL (ref 78–100)
NITRATE UR QL: NEGATIVE
PH UR STRIP: 7 [PH] (ref 5–7)
PLATELET # BLD AUTO: 222 10E3/UL (ref 150–450)
POTASSIUM SERPL-SCNC: 4.6 MMOL/L (ref 3.4–5.3)
RBC # BLD AUTO: 4.11 10E6/UL (ref 3.8–5.2)
RBC URINE: <1 /HPF
SODIUM SERPL-SCNC: 141 MMOL/L (ref 135–145)
SP GR UR STRIP: 1.01 (ref 1–1.03)
SQUAMOUS EPITHELIAL: <1 /HPF
UROBILINOGEN UR STRIP-MCNC: NORMAL MG/DL
WBC # BLD AUTO: 6.3 10E3/UL (ref 4–11)
WBC URINE: <1 /HPF

## 2025-01-20 PROCEDURE — 85730 THROMBOPLASTIN TIME PARTIAL: CPT | Performed by: PATHOLOGY

## 2025-01-20 PROCEDURE — 85027 COMPLETE CBC AUTOMATED: CPT | Performed by: PATHOLOGY

## 2025-01-20 PROCEDURE — 81001 URINALYSIS AUTO W/SCOPE: CPT | Performed by: PATHOLOGY

## 2025-01-20 PROCEDURE — 36415 COLL VENOUS BLD VENIPUNCTURE: CPT | Performed by: PATHOLOGY

## 2025-01-20 PROCEDURE — 99204 OFFICE O/P NEW MOD 45 MIN: CPT | Performed by: NEUROLOGICAL SURGERY

## 2025-01-20 PROCEDURE — 85610 PROTHROMBIN TIME: CPT | Performed by: PATHOLOGY

## 2025-01-20 PROCEDURE — 80048 BASIC METABOLIC PNL TOTAL CA: CPT | Performed by: PATHOLOGY

## 2025-01-20 NOTE — NURSING NOTE
Chief Complaint   Patient presents with    New Patient     Patient's son states her BP is usually in the normal range.  They think the extreme cold might have something to do with it.  Provider advised prior to visit.    .sign

## 2025-01-20 NOTE — LETTER
1/20/2025       RE: Jennifer Ferrera  [address] [address]     Dear Colleague,    Thank you for referring your patient, Jennifer Ferrera, to the [facility] [place] [facility]. Please see a copy of my visit note below.    NEUROSURGERY    HISTORY AND PHYSICAL EXAM    Chief Complaint   Patient presents with     New Patient     Deleted deep brain stimulator generators/batteries over the bilateral chest wall.     HISTORY OF PRESENT ILLNESS  Ms. Jennifer Ferrera is a 89 year old female with long standing tremor.  She underwent bilateral DBS placement back in October of 2002 with electrodes in the bilateral ventral intermediate nucleus of the thalamus.  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  Since then, she has had bilateral DBS system with numerous generators/batteries being replaced.  She had the replacement surgeries in 7/19/2008, 7/2/2013, 11/8/2016 and 12/29/2020.  Her last replacement surgery was with me.  It appears that her generators/batteries last her about four years now.  She is followed by Dr. Neves and she was seen on 12/19/2024.   On 12/27/2024, her generators/batteries were noted to be at PRABHAKAR.  The left side generator/battery was at 2.54 Volts and the right side generator/battery was at 2.62 Volts.      Patient states that she is in her usual state of health.  She denies any recent illness.  She does struggle with bilateral lower extremity edema and she has a history of open wound but she states that she has no ulcers.  She did have fluid in the lower extremities but not recently.  There is no report of active infection.  She does take a baby aspirin, 81 mg, daily and she took a dose today.  She is not on any other anticoagulants or antiplatelet therapy.  In terms of her device, she has the DBS on all the time.  She is getting more tremors recently.  She denies any exposed  hardware anywhere.  She also denies any abnormal shocks or undesirable stimulation effects.  She is also ok with the current locations of the generators/batteries.  She has bilateral Activa SC, model 20329.       Past Medical History:   Diagnosis Date     1st degree AV block 10/20/2015    ECG shows mild sinus bradycardia with 1st degree heart block and min voltage criteria for LVH; borderline ECG     Anxiety      Dysarthria      Facial numbness      Hearing loss      Hypercholesterolemia      Hypertension      Low back pain      Lymphedema      Macular degeneration (senile) of retina      Memory loss      Mood change      Osteoporosis, unspecified      PSEUDOBULBAR AFFECT      Recurrent falls      Skin cancer      Tremor      Urinary incontinence, urge      Urinary urgency      Wears glasses        Past Surgical History:   Procedure Laterality Date     CHOLECYSTECTOMY       Excision of basal cell carcinoma from RUE  Sep 2015    right shoulder     EXTRACAPSULAR CATARACT EXTRATION WITH INTRAOCULAR LENS IMPLANT       EXTRACAPSULAR CATARACT EXTRATION WITH INTRAOCULAR LENS IMPLANT       HYSTERECTOMY       IR PICC PLACEMENT > 5 YRS OF AGE  6/20/2019     REPLACE DEEP BRAIN STIMULATION GENERATOR / BATTERY Bilateral 12/29/2020    Procedure: Replacement of deep brain stimulator generator/battery over bilateral chest wall  **Latex Allergy**;  Surgeon: Noe Collins MD;  Location: [facility] OR     REPLACE DEEP BRAIN STIMULATION GENERATOR / BATTERY BILATERAL Bilateral 11/8/2016    Procedure: REPLACE DEEP BRAIN STIMULATION GENERATOR / BATTERY BILATERAL;  Surgeon: Bentley Martinez MD;  Location: [facility] OR     REPLACE PULSE GENERATOR SUBCUTANEOUS BILATERAL  29 Jul 2008    Replaced both soletra     REPLACE PULSE GENERATOR SUBCUTANEOUS BILATERAL  7/2/2013    Procedure: REPLACE PULSE GENERATOR SUBCUTANEOUS BILATERAL;  Bilateral Deep Brain Stimulator Battery Exchange ;  Surgeon: Bentley Martinez MD;  Location: [facility] OR      Right DBS Lead removal  jan 2003    infected right lead removed; was in a nursing home on antiobiotics     STEREOTACTIC INSERTION DEEP BRAIN STIMULATION  12 May 2003    placed lead in may after removal of right thalamic  DBS lead due to infection feb 17 2003     STEREOTACTIC INSERTION DEEP BRAIN STIMULATION  9 jun 2003    revision of right DBS electrode     STEREOTACTIC INSERTION DEEP BRAIN STIMULATION  oct 2002    left thalamic DBS lead placed     STEREOTACTIC INSERTION DEEP BRAIN STIMULATION  oct 2002    right thalamic DBS lead placed       Family History   Problem Relation Age of Onset     Diabetes Father      Parkinsonism Mother      Deep Vein Thrombosis Sister      Parkinsonism Sister      Neurologic Disorder Brother         tremor     Neurologic Disorder Brother         tremor     Cancer Brother         gallbladder     Neurologic Disorder Brother         tremor     Cancer Brother         throat     Connective Tissue Disorder Sister         tremor     Parkinsonism Other         niece     Parkinsonism Other         niece       Social History     Socioeconomic History     Marital status:      Spouse name: Not on file     Number of children: Not on file     Years of education: Not on file     Highest education level: Not on file   Occupational History     Not on file   Tobacco Use     Smoking status: Never     Smokeless tobacco: Never   Substance and Sexual Activity     Alcohol use: No     Drug use: No     Sexual activity: Not on file   Other Topics Concern     Parent/sibling w/ CABG, MI or angioplasty before 65F 55M? Not Asked   Social History Narrative    lives in [place]. . Daughter Mary Ann Mijares.     Social Drivers of Health     Financial Resource Strain: Not on file   Food Insecurity: Not on file   Transportation Needs: Not on file   Physical Activity: Not on file   Stress: Not on file   Social Connections: Not on file   Interpersonal Safety: Not on file   Housing Stability: Not on file           Allergies   Allergen Reactions     Adhesive Tape      Other reaction(s): itching     Latex      Vioxx      Pt. Is unaware of any allergy to this med.       Current Outpatient Medications   Medication Sig Dispense Refill     ASPIRIN 81 PO Take 1 tablet by mouth daily       carboxymethylcellulose (REFRESH) 1 % ophthalmic solution Place 1 drop into both eyes 2 times daily as needed 1 Bottle      Cholecalciferol (VITAMIN D3) 25 MCG (1000 UT) CAPS TAKE 1 CAPSULE BY MOUTH DAILY 90 capsule 3     escitalopram (LEXAPRO) 10 MG tablet Take 10 mg by mouth every morning        furosemide (LASIX) 40 MG tablet every morning 40mg tab by mouth daily  1     levothyroxine (SYNTHROID/LEVOTHROID) 50 MCG tablet Take 50 mcg by mouth every morning        levothyroxine (SYNTHROID/LEVOTHROID) 75 MCG tablet Take 75 mcg by mouth daily       Multiple Vitamins-Minerals (OCUVITE PRESERVISION PO) Take 1 tablet by mouth every evening        pravastatin (PRAVACHOL) 20 MG tablet Take 20 mg by mouth every evening  90 tablet 3     Probiotic Product (PROBIOTIC-10 PO) Take by mouth every morning        No current facility-administered medications for this visit.       REVIEW OF SYSTEMS:  General: Negative for chills/sweats/fever. difficulty sleeping, headache, recent fatigue, or weight gain/loss.  Eyes: Negative for blurred vision, crossed eyes, double vision, recent eye infections, vision flashes, or vision halos.  Ears/Nose/Mouth/Throat: POSITIVE for hearing loss.  Negative for bleeding gums, difficulty swallowing, earache, ear discharge, hoarseness, nosebleeds, tinnitus, or sinus problems.  Respiratory:Negative for chronic cough, coughing blood, night sweats, shortness of breath, Tuberculosis, or wheezing.  Cardiovascular: POSITIVE for dyslipidemia, hypertension, dysrhythmias. Negative for chest pain, dyspnea at night, heart murmur, palpitations, pacemaker, pacemaker, poor circulation, swollen legs/feet, or varicose veins.  Gastrointestinal:  "POSITIVE for GERD, constipation. Negative for melena, hematochezia, chronic diarrhea, Hepatitis A/B/C, Liver Disease, nausea, or vomiting.   Genitourinary: POSITIVE for urinary incontinence.  Negative for Urinary retention, genital discharge, prostate problems, urgency, or UTI.   Neurological: POSITIVE for essential tremor.  Negative for syncope, headaches, numbness of arms/legs, tingling in hands/arms/legs, memory problems, or seizures.  Psychological: POSITIVE for anxiety. Negative for depression, panic attacks, or restlessness.  Skin: Negative for chronic skin itching, color changes in hand/feet when cold, poor scarring, non-healing ulcers, skin rashes/hives, unusual moles.  Musculoskeletal: POSITIVE for arthritis, osteoporosis.  Negative for joint swelling in hands/wrists/hips/knees/joints, muscle tenderness in arms/legs.  Endocrine: POSITIVE for hypothyroidism, obesity. Negative for excessive thirst/hunger, intolerance for warm rooms, loss of libido, multiple broken bones, rapid weight gain/loss, galactorrhea.  Hematologic/Lymphatic: Negative for easy skin bruising, significant fatigue, prolonged bleeding, tender glands/lymph nodes.  Allergies: Negative for asthma or hay fever.      PHYSICAL EXAM  BP (!) 150/71 (BP Location: Right arm, Patient Position: Sitting)   Pulse 68   Resp 16   Ht 1.626 m (5' 4\")   Wt 80.3 kg (177 lb)   SpO2 97%   BMI 30.38 kg/m      General: Awake, alert, oriented. Well nourished, well developed, she is not in any acute distress.  Uses a walker.  HEENT: Head normocephalic, atraumatic. No carotid bruit. Neck supple. Good range of motion. No palpable thyroid mass.  Heart: Regular rhythm and rate. No murmurs.  Lungs: Clear to auscultation and percussion bilaterally. No ronchi, rales or wheeze.  Abdomen: Soft, non-tender, non-distended. No hepatosplenomegaly.  Extremity: Warm with no clubbing or cyanosis. Bilateral lower extremity edema.  Incisions: Bilateral frontal, bilateral " parietal and bilateral chest wall incisions are all well healed.  No wound breakdown.  No exposed hardware.    Neurological:  Awake, alert and oriented to date, time, place and person. Speech somewhat soft and slurred.   Pupils equal, round, reactive to light.  Extraocular movement intact.  Visual fields are full on confrontation.  Hearing is decreased - wears bilateral hearing aids.  Facial sensation intact.  Face symmetric.  Tongue midline.  Uvula elevates equally.    Motor: OK strength throughout.  Sensation: intact to light touch and pinpoint.  Deep tendon reflexes: trace throughout. Negative for clonus. Negative for García's sign. No dysmetria.      ASSESSMENT   89 year old female  1.  Essential tremor  2.  S/p bilateral DBS placement, October of 2002  3.  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  S/p numerous replacement of bilateral DBS generators/batteries over bilateral chest wall, in 7/29/2008, 7/2/2013, and last replacement in 11/8/2016  5.  S/p replacement of deep brain stimulator generators/batteries over bilateral chest walls on 12/29/2020  6.  Depleted deep brain stimulator generators/batteries, model Activa SC, over the left and right chest wall    Ms. Jennifer Ferrera presents with depleted deep brain stimulator generators/batteries, model Activa SC, over the left and right chest wall.  They have reached PRABHAKAR and will need to be replaced soon.  She gets both of them replaced at the same time.    During today's visit, we discussed the surgical procedure for replacing the DBS generators/batteries over the bilateral chest wall.  She currently has the Activia SC generators/batteries, and they will be maintained during the upcoming procedure.  Risks, benefits, alternative therapies were discussed with the patient, including but not limited to infection and bleeding.  This surgical procedure will be performed under MAC with local anesthetic.  The thinned part of the  wound/pocket may be corrected with mobilization of the tissue under the incision. T he pocket may need to be enlarged to minimize the stress on the closure.  Surgical procedure was discussed in detail.  All questions were answered, and she and her son expressed understanding.      PLAN  Replacement of deep brain stimulator generators/batteries over the bilateral chest wall  Preop labs today  PAC      26 minutes were spent face to face with the patient of which more than 50% of the time was spent counseling and discussing the above issues regarding diagnosis, differential, treatment options, and steps for further evaluation.  5 minutes were spent reviewing patient's chart, imaging in PACS.  17 minutes were spent on documentation for this encounter.  48 minutes total were spent on this encounter today.    Again, thank you for allowing me to participate in the care of your patient.      Sincerely,    Noe Collins MD

## 2025-01-20 NOTE — PATIENT INSTRUCTIONS
Dear Jennifer,    Below you will find a schedule of appointments and information about preparing for surgery. Please review the information and let me know what questions you have.     Surgery time is subject to change. You will be notified of any changes as your surgery date gets closer.     Postoperative appointments in the neurosurgery clinic are an important part of your surgery. At the postoperative appointment, your provider will look at your incisions, talk to you about how you are feeling, address concerns, and answer questions about activity restrictions, returning to work, etc. This appointment is required even if you live far from our clinic.    Thank you,  Leah Queen, RN, BA  Neurosurgery Care Coordinator  [facility]  [phone]      Surgery    Surgery: DBS battery replacement over the left and right chest walls  Date/time: To be determined. Arrive on [address] 2 hours before scheduled surgery time  Surgeon: Dr. Collins  Location: [facility]  3rd Floor ([address]), [address] [place] [address], [phone]   *You will go home on the day of surgery        Pre- and Post-Surgery Appointments    Preoperative Assessment Center (PAC)  At this appointment, a provider will review your health history and medications, and will explain how to prepare for surgery.   Date/time: To be determned  Location: 5[facility], [address] [place] [address]. Appointment can be scheduled as a virtual visit.         Pre-Surgery Lab Tests   Date/time: 1/20/25 at 12:00 p.m.  Location: [facility] floor       Post-Surgery Follow-up  Date/time: 2 weeks after surgery. Date to be determined  Location: [address] [place] [address], [address] [place] [address]  Provider: Dr. Collins or advanced practice provider        Preparing for Surgery    Medications   7 days before surgery: Stop aspirin, fish oil (omega-3  fatty acids), multivitamins and other supplements.    4 days before surgery: Stop naproxen. You may take acetaminophen (Tylenol) if you need something for pain relief prior to surgery.     24 hours before surgery: Stop ibuprofen. You may take acetaminophen (Tylenol) if you need something for pain relief prior to surgery.     *You will receive additional medication instructions at your PAC appointment. Be sure to follow those instructions as well.       Eating and Drinking Before Surgery Arrival Time  8 hours before surgery arrival time: Stop eating solid food and dairy products.   You may have clear liquids until 2 hours before arrival time. Examples of clear liquids: water, broth, clear juice (like apple juice), clear soda (7-Up, Sprite), black coffee or plain tea, Gatorade. Do not drink milk or other dairy products.     2 hours before arrival time: Stop drinking clear liquids.       Preoperative Showers   You will need to purchase a 4-ounce bottle of antibacterial soap such as Hibiclens or ScrubCare at your local pharmacy. If your pharmacy doesn't carry one of these brands, the pharmacist can recommend a substitute.    The night before surgery, take a shower using your regular soap and shampoo. Before getting out of the shower, use half the bottle of the antibacterial soap from the neck down. Let it sit on your skin for 1 minute before rinsing. Use clean towels, clean pajamas and clean sheets on your bed. Do not use lotion, powder, fragrance or deodorant.     The day of surgery, repeat the above shower process. Wear clean clothes to the hospital. Do not use lotion, powder, fragrance or deodorant.       *If you become ill or have a fever within a few days before your surgery, let your provider know right away.   *Do not drink alcohol or use cannabis products 24 hours before or after surgery, or if you are taking narcotic pain medication.  *Remove all jewelry and leave other valuables at home.   *Bring your  insurance card and identification with you on the day of surgery.   *You must have a responsible adult who can drive you to and from surgery, and who can stay with you for the first 24 hours after your discharge from the hospital.

## 2025-01-20 NOTE — PROGRESS NOTES
NEUROSURGERY    HISTORY AND PHYSICAL EXAM    Chief Complaint   Patient presents with    New Patient     Deleted deep brain stimulator generators/batteries over the bilateral chest wall.     HISTORY OF PRESENT ILLNESS  Ms. Jennifer Ferrera is a 89 year old female with long standing tremor.  She underwent bilateral DBS placement back in October of 2002 with electrodes in the bilateral ventral intermediate nucleus of the thalamus.  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  Since then, she has had bilateral DBS system with numerous generators/batteries being replaced.  She had the replacement surgeries in 7/19/2008, 7/2/2013, 11/8/2016 and 12/29/2020.  Her last replacement surgery was with me.  It appears that her generators/batteries last her about four years now.  She is followed by Dr. Neves and she was seen on 12/19/2024.   On 12/27/2024, her generators/batteries were noted to be at PRABHAKAR.  The left side generator/battery was at 2.54 Volts and the right side generator/battery was at 2.62 Volts.      Patient states that she is in her usual state of health.  She denies any recent illness.  She does struggle with bilateral lower extremity edema and she has a history of open wound but she states that she has no ulcers.  She did have fluid in the lower extremities but not recently.  There is no report of active infection.  She does take a baby aspirin, 81 mg, daily and she took a dose today.  She is not on any other anticoagulants or antiplatelet therapy.  In terms of her device, she has the DBS on all the time.  She is getting more tremors recently.  She denies any exposed hardware anywhere.  She also denies any abnormal shocks or undesirable stimulation effects.  She is also ok with the current locations of the generators/batteries.  She has bilateral Activa SC, model 28332.       Past Medical History:   Diagnosis Date    1st degree AV block 10/20/2015    ECG shows mild sinus  bradycardia with 1st degree heart block and min voltage criteria for LVH; borderline ECG    Anxiety     Dysarthria     Facial numbness     Hearing loss     Hypercholesterolemia     Hypertension     Low back pain     Lymphedema     Macular degeneration (senile) of retina     Memory loss     Mood change     Osteoporosis, unspecified     PSEUDOBULBAR AFFECT     Recurrent falls     Skin cancer     Tremor     Urinary incontinence, urge     Urinary urgency     Wears glasses        Past Surgical History:   Procedure Laterality Date    CHOLECYSTECTOMY      Excision of basal cell carcinoma from RUE  Sep 2015    right shoulder    EXTRACAPSULAR CATARACT EXTRATION WITH INTRAOCULAR LENS IMPLANT      EXTRACAPSULAR CATARACT EXTRATION WITH INTRAOCULAR LENS IMPLANT      HYSTERECTOMY      IR PICC PLACEMENT > 5 YRS OF AGE  6/20/2019    REPLACE DEEP BRAIN STIMULATION GENERATOR / BATTERY Bilateral 12/29/2020    Procedure: Replacement of deep brain stimulator generator/battery over bilateral chest wall  **Latex Allergy**;  Surgeon: Noe Collins MD;  Location: [facility] OR    REPLACE DEEP BRAIN STIMULATION GENERATOR / BATTERY BILATERAL Bilateral 11/8/2016    Procedure: REPLACE DEEP BRAIN STIMULATION GENERATOR / BATTERY BILATERAL;  Surgeon: Bentley Martinez MD;  Location: [facility] OR    REPLACE PULSE GENERATOR SUBCUTANEOUS BILATERAL  29 Jul 2008    Replaced both soletra    REPLACE PULSE GENERATOR SUBCUTANEOUS BILATERAL  7/2/2013    Procedure: REPLACE PULSE GENERATOR SUBCUTANEOUS BILATERAL;  Bilateral Deep Brain Stimulator Battery Exchange ;  Surgeon: Bentley Martinez MD;  Location: [facility] OR    Right DBS Lead removal  jan 2003    infected right lead removed; was in a nursing home on antiobiotics    STEREOTACTIC INSERTION DEEP BRAIN STIMULATION  12 May 2003    placed lead in may after removal of right thalamic  DBS lead due to infection feb 17 2003    STEREOTACTIC INSERTION DEEP BRAIN STIMULATION  9 jun 2003    revision of right DBS  electrode    STEREOTACTIC INSERTION DEEP BRAIN STIMULATION  oct 2002    left thalamic DBS lead placed    STEREOTACTIC INSERTION DEEP BRAIN STIMULATION  oct 2002    right thalamic DBS lead placed       Family History   Problem Relation Age of Onset    Diabetes Father     Parkinsonism Mother     Deep Vein Thrombosis Sister     Parkinsonism Sister     Neurologic Disorder Brother         tremor    Neurologic Disorder Brother         tremor    Cancer Brother         gallbladder    Neurologic Disorder Brother         tremor    Cancer Brother         throat    Connective Tissue Disorder Sister         tremor    Parkinsonism Other         niece    Parkinsonism Other         niece       Social History     Socioeconomic History    Marital status:      Spouse name: Not on file    Number of children: Not on file    Years of education: Not on file    Highest education level: Not on file   Occupational History    Not on file   Tobacco Use    Smoking status: Never    Smokeless tobacco: Never   Substance and Sexual Activity    Alcohol use: No    Drug use: No    Sexual activity: Not on file   Other Topics Concern    Parent/sibling w/ CABG, MI or angioplasty before 65F 55M? Not Asked   Social History Narrative    lives in [place]. . Daughter Mary Ann Mijares.     Social Drivers of Health     Financial Resource Strain: Not on file   Food Insecurity: Not on file   Transportation Needs: Not on file   Physical Activity: Not on file   Stress: Not on file   Social Connections: Not on file   Interpersonal Safety: Not on file   Housing Stability: Not on file          Allergies   Allergen Reactions    Adhesive Tape      Other reaction(s): itching    Latex     Vioxx      Pt. Is unaware of any allergy to this med.       Current Outpatient Medications   Medication Sig Dispense Refill    ASPIRIN 81 PO Take 1 tablet by mouth daily      carboxymethylcellulose (REFRESH) 1 % ophthalmic solution Place 1 drop into both eyes 2 times  daily as needed 1 Bottle     Cholecalciferol (VITAMIN D3) 25 MCG (1000 UT) CAPS TAKE 1 CAPSULE BY MOUTH DAILY 90 capsule 3    escitalopram (LEXAPRO) 10 MG tablet Take 10 mg by mouth every morning       furosemide (LASIX) 40 MG tablet every morning 40mg tab by mouth daily  1    levothyroxine (SYNTHROID/LEVOTHROID) 50 MCG tablet Take 50 mcg by mouth every morning       levothyroxine (SYNTHROID/LEVOTHROID) 75 MCG tablet Take 75 mcg by mouth daily      Multiple Vitamins-Minerals (OCUVITE PRESERVISION PO) Take 1 tablet by mouth every evening       pravastatin (PRAVACHOL) 20 MG tablet Take 20 mg by mouth every evening  90 tablet 3    Probiotic Product (PROBIOTIC-10 PO) Take by mouth every morning        No current facility-administered medications for this visit.       REVIEW OF SYSTEMS:  General: Negative for chills/sweats/fever. difficulty sleeping, headache, recent fatigue, or weight gain/loss.  Eyes: Negative for blurred vision, crossed eyes, double vision, recent eye infections, vision flashes, or vision halos.  Ears/Nose/Mouth/Throat: POSITIVE for hearing loss.  Negative for bleeding gums, difficulty swallowing, earache, ear discharge, hoarseness, nosebleeds, tinnitus, or sinus problems.  Respiratory:Negative for chronic cough, coughing blood, night sweats, shortness of breath, Tuberculosis, or wheezing.  Cardiovascular: POSITIVE for dyslipidemia, hypertension, dysrhythmias. Negative for chest pain, dyspnea at night, heart murmur, palpitations, pacemaker, pacemaker, poor circulation, swollen legs/feet, or varicose veins.  Gastrointestinal: POSITIVE for GERD, constipation. Negative for melena, hematochezia, chronic diarrhea, Hepatitis A/B/C, Liver Disease, nausea, or vomiting.   Genitourinary: POSITIVE for urinary incontinence.  Negative for Urinary retention, genital discharge, prostate problems, urgency, or UTI.   Neurological: POSITIVE for essential tremor.  Negative for syncope, headaches, numbness of arms/legs,  "tingling in hands/arms/legs, memory problems, or seizures.  Psychological: POSITIVE for anxiety. Negative for depression, panic attacks, or restlessness.  Skin: Negative for chronic skin itching, color changes in hand/feet when cold, poor scarring, non-healing ulcers, skin rashes/hives, unusual moles.  Musculoskeletal: POSITIVE for arthritis, osteoporosis.  Negative for joint swelling in hands/wrists/hips/knees/joints, muscle tenderness in arms/legs.  Endocrine: POSITIVE for hypothyroidism, obesity. Negative for excessive thirst/hunger, intolerance for warm rooms, loss of libido, multiple broken bones, rapid weight gain/loss, galactorrhea.  Hematologic/Lymphatic: Negative for easy skin bruising, significant fatigue, prolonged bleeding, tender glands/lymph nodes.  Allergies: Negative for asthma or hay fever.      PHYSICAL EXAM  BP (!) 150/71 (BP Location: Right arm, Patient Position: Sitting)   Pulse 68   Resp 16   Ht 1.626 m (5' 4\")   Wt 80.3 kg (177 lb)   SpO2 97%   BMI 30.38 kg/m      General: Awake, alert, oriented. Well nourished, well developed, she is not in any acute distress.  Uses a walker.  HEENT: Head normocephalic, atraumatic. No carotid bruit. Neck supple. Good range of motion. No palpable thyroid mass.  Heart: Regular rhythm and rate. No murmurs.  Lungs: Clear to auscultation and percussion bilaterally. No ronchi, rales or wheeze.  Abdomen: Soft, non-tender, non-distended. No hepatosplenomegaly.  Extremity: Warm with no clubbing or cyanosis. Bilateral lower extremity edema.  Incisions: Bilateral frontal, bilateral parietal and bilateral chest wall incisions are all well healed.  No wound breakdown.  No exposed hardware.    Neurological:  Awake, alert and oriented to date, time, place and person. Speech somewhat soft and slurred.   Pupils equal, round, reactive to light.  Extraocular movement intact.  Visual fields are full on confrontation.  Hearing is decreased - wears bilateral hearing " aids.  Facial sensation intact.  Face symmetric.  Tongue midline.  Uvula elevates equally.    Motor: OK strength throughout.  Sensation: intact to light touch and pinpoint.  Deep tendon reflexes: trace throughout. Negative for clonus. Negative for García's sign. No dysmetria.      ASSESSMENT   89 year old female  1.  Essential tremor  2.  S/p bilateral DBS placement, October of 2002  3.  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  S/p numerous replacement of bilateral DBS generators/batteries over bilateral chest wall, in 7/29/2008, 7/2/2013, and last replacement in 11/8/2016  5.  S/p replacement of deep brain stimulator generators/batteries over bilateral chest walls on 12/29/2020  6.  Depleted deep brain stimulator generators/batteries, model Activa SC, over the left and right chest wall    Ms. Jennifer Ferrera presents with depleted deep brain stimulator generators/batteries, model Activa SC, over the left and right chest wall.  They have reached PRABHAKAR and will need to be replaced soon.  She gets both of them replaced at the same time.    During today's visit, we discussed the surgical procedure for replacing the DBS generators/batteries over the bilateral chest wall.  She currently has the Activia SC generators/batteries, and they will be maintained during the upcoming procedure.  Risks, benefits, alternative therapies were discussed with the patient, including but not limited to infection and bleeding.  This surgical procedure will be performed under MAC with local anesthetic.  The thinned part of the wound/pocket may be corrected with mobilization of the tissue under the incision. T he pocket may need to be enlarged to minimize the stress on the closure.  Surgical procedure was discussed in detail.  All questions were answered, and she and her son expressed understanding.      PLAN  Replacement of deep brain stimulator generators/batteries over the bilateral chest wall  Preop labs  today  PAC      26 minutes were spent face to face with the patient of which more than 50% of the time was spent counseling and discussing the above issues regarding diagnosis, differential, treatment options, and steps for further evaluation.  5 minutes were spent reviewing patient's chart, imaging in PACS.  17 minutes were spent on documentation for this encounter.  48 minutes total were spent on this encounter today.

## 2025-01-21 ENCOUNTER — TELEPHONE (OUTPATIENT)
Dept: NEUROSURGERY | Facility: CLINIC | Age: OVER 89
End: 2025-01-21
Payer: COMMERCIAL

## 2025-01-21 ENCOUNTER — TELEPHONE (OUTPATIENT)
Dept: NEUROLOGY | Facility: CLINIC | Age: OVER 89
End: 2025-01-21
Payer: COMMERCIAL

## 2025-01-21 NOTE — TELEPHONE ENCOUNTER
Got verbal ok to talk to daughter    Patient confirmed scheduled appointment:  Date: 4/24/2025  Time: 1:10pm  Visit type: Return Movement Disorder  Provider: Pura   Location: [facility]  Testing/imaging: NA  Additional notes: SRIKANTH 2/11 appt per pt/daughter    Catherine Couch on 1/21/2025 at 3:13 PM

## 2025-01-21 NOTE — TELEPHONE ENCOUNTER
Attempted to call patient to schedule surgery with Dr. Collins. Patients mailbox is full. Will attempt to call daughter    Elissa RhodesJamie  1/21/2025 at 10:28 AM

## 2025-01-21 NOTE — TELEPHONE ENCOUNTER
Scheduled surgery with Dr. Collins on 2/13/2025    Spoke with: TANYAMELIDA (Daughter)    Surgery is located at [facility]    Patient will be seen for their H&P by PAC on 1/29/2025 at 1pm - Provided address & floor number    Does patient need a consult before upcoming surgery? No    Anesthesia type: MAC + Local    Requested Imaging required for surgery: No    Patient is scheduled for their 2 week post op on 3/3/2025 at 1145am with Dr. Collins    [employer] rep email will be added to surgeons calendar via invite    Patient will receive their surgery packet & surgical soap via mail per their preference - Confirmed address on file is up to date    Patient was not provided a start time for surgery & is aware they will receive this information at their PAC appointment as well as any pre-op instructions.    Additional comments: patients daughter wants to know if appointment on 2/11 with Dr. Neves needs rescheduled    Patient was instructed to call back with any further questions or concerns.     Elissa Rhodes on 1/21/2025 at 10:29 AM

## 2025-01-23 NOTE — TELEPHONE ENCOUNTER
FUTURE VISIT INFORMATION      SURGERY INFORMATION:  Date: 25  Location: [facility] OR  Surgeon:  Noe Collins MD   Anesthesia Type:  MAC with Local  Procedure: Replacement of deep brain stimulator generator/battery over bilateral chest wall   Consult: ov 25    RECORDS REQUESTED FROM:       Primary Care Provider: Michelle Carvajal MD     Pertinent Medical History: 1st degree AV Block, bradycardia, hypertension    Most recent EKG+ [id number]

## 2025-01-29 ENCOUNTER — OFFICE VISIT (OUTPATIENT)
Dept: SURGERY | Facility: CLINIC | Age: OVER 89
End: 2025-01-29
Payer: COMMERCIAL

## 2025-01-29 ENCOUNTER — PRE VISIT (OUTPATIENT)
Dept: SURGERY | Facility: CLINIC | Age: OVER 89
End: 2025-01-29

## 2025-01-29 ENCOUNTER — ANESTHESIA EVENT (OUTPATIENT)
Dept: SURGERY | Facility: CLINIC | Age: OVER 89
End: 2025-01-29
Payer: COMMERCIAL

## 2025-01-29 VITALS
RESPIRATION RATE: 16 BRPM | DIASTOLIC BLOOD PRESSURE: 78 MMHG | HEIGHT: 64 IN | OXYGEN SATURATION: 97 % | TEMPERATURE: 97.5 F | WEIGHT: 172.3 LBS | BODY MASS INDEX: 29.41 KG/M2 | HEART RATE: 71 BPM | SYSTOLIC BLOOD PRESSURE: 147 MMHG

## 2025-01-29 DIAGNOSIS — Z01.818 PREOP EXAMINATION: Primary | ICD-10-CM

## 2025-01-29 DIAGNOSIS — G25.0 ESSENTIAL TREMOR: ICD-10-CM

## 2025-01-29 ASSESSMENT — ENCOUNTER SYMPTOMS
DYSRHYTHMIAS: 1
SEIZURES: 0

## 2025-01-29 ASSESSMENT — PAIN SCALES - GENERAL: PAINLEVEL_OUTOF10: NO PAIN (0)

## 2025-01-29 ASSESSMENT — LIFESTYLE VARIABLES: TOBACCO_USE: 0

## 2025-01-29 NOTE — H&P
Pre-Operative H & P     CC:  Preoperative exam to assess for increased cardiopulmonary risk while undergoing surgery and anesthesia.    Date of Encounter: 1/29/2025  Primary Care Physician:  Michelle Carvajal     Reason for visit:   Encounter Diagnoses   Name Primary?    Preop examination Yes    Essential tremor        HPI  Jennifer Ferrera is a 89 year old female who presents for pre-operative H & P in preparation for  Procedure Information       Case: [id number] Date/Time: 02/13/25 1335    Procedure: Replacement of deep brain stimulator generator/battery over bilateral chest wall (Bilateral: Chest)    Anesthesia type: MAC with Local    Diagnosis:       End of battery life of deep brain stimulator [Z45.42]      Status post deep brain stimulator placement [Z96.89]      Tremor [R25.1]    Pre-op diagnosis:       End of battery life of deep brain stimulator [Z45.42]      Status post deep brain stimulator placement [Z96.89]      Tremor [R25.1]    Location:  OR  /  OR    Providers: Noe Collins MD          History is obtained from the patient, son and chart review    Patient with history of longstanding essential tremor.  She is status post bilateral DBS placement back in October of 2002 with electrodes in the bilateral ventral intermediate nucleus of the thalamus.  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  Since then, she has had bilateral DBS system with numerous generators/batteries being replaced. She is followed by Dr. Neves and she was last seen on 12/19/2024.   On 12/27/2024, her generators/batteries were noted to be at PRABHAKAR. The left side generator/battery was at 2.54 Volts and the right side generator/battery was at 2.62 Volts. She was referred to Dr. Collins and has been counseled for above procedures.     Patient's history is otherwise significant for hypercholesterolemia, hypertension, first-degree AV block, lymphedema, hypothyroidism, urinary incontinence,  osteoarthritis, osteoporosis, low back pain, depression, anxiety, and basal cell carcinoma. She has profound hearing loss         Hx of abnormal bleeding or anti-platelet use: ASA 81 mg daily    Menstrual history: No LMP recorded. Patient has had a hysterectomy.     Past Medical History  Past Medical History:   Diagnosis Date    1st degree AV block 10/20/2015    ECG shows mild sinus bradycardia with 1st degree heart block and min voltage criteria for LVH; borderline ECG    Anxiety     Dysarthria     Essential tremor     Facial numbness     Hearing loss     History of rheumatic fever as a child     Hypercholesterolemia     Hypertension     Low back pain     Lymphedema     Macular degeneration (senile) of retina     Memory loss     Mood change     Osteoporosis, unspecified     PSEUDOBULBAR AFFECT     Recurrent falls     Skin cancer     Tremor     Urinary incontinence, urge     Urinary urgency     Wears glasses        Past Surgical History  Past Surgical History:   Procedure Laterality Date    CHOLECYSTECTOMY      Excision of basal cell carcinoma from RUE  Sep 2015    right shoulder    EXTRACAPSULAR CATARACT EXTRATION WITH INTRAOCULAR LENS IMPLANT      EXTRACAPSULAR CATARACT EXTRATION WITH INTRAOCULAR LENS IMPLANT      HYSTERECTOMY      IR PICC PLACEMENT > 5 YRS OF AGE  6/20/2019    REPLACE DEEP BRAIN STIMULATION GENERATOR / BATTERY Bilateral 12/29/2020    Procedure: Replacement of deep brain stimulator generator/battery over bilateral chest wall  **Latex Allergy**;  Surgeon: Noe Collins MD;  Location: [facility] OR    REPLACE DEEP BRAIN STIMULATION GENERATOR / BATTERY BILATERAL Bilateral 11/8/2016    Procedure: REPLACE DEEP BRAIN STIMULATION GENERATOR / BATTERY BILATERAL;  Surgeon: Bentley Martinez MD;  Location: [facility] OR    REPLACE PULSE GENERATOR SUBCUTANEOUS BILATERAL  29 Jul 2008    Replaced both soletra    REPLACE PULSE GENERATOR SUBCUTANEOUS BILATERAL  7/2/2013    Procedure: REPLACE PULSE GENERATOR  SUBCUTANEOUS BILATERAL;  Bilateral Deep Brain Stimulator Battery Exchange ;  Surgeon: Bentley Martinez MD;  Location: [facility] OR    Right DBS Lead removal  jan 2003    infected right lead removed; was in a nursing home on antiobiotics    STEREOTACTIC INSERTION DEEP BRAIN STIMULATION  12 May 2003    placed lead in may after removal of right thalamic  DBS lead due to infection feb 17 2003    STEREOTACTIC INSERTION DEEP BRAIN STIMULATION  9 jun 2003    revision of right DBS electrode    STEREOTACTIC INSERTION DEEP BRAIN STIMULATION  oct 2002    left thalamic DBS lead placed    STEREOTACTIC INSERTION DEEP BRAIN STIMULATION  oct 2002    right thalamic DBS lead placed       Prior to Admission Medications  Current Outpatient Medications   Medication Sig Dispense Refill    ASPIRIN 81 PO Take 1 tablet by mouth every morning.      carboxymethylcellulose (REFRESH) 1 % ophthalmic solution Place 1 drop into both eyes 2 times daily as needed 1 Bottle     Cholecalciferol (VITAMIN D3) 25 MCG (1000 UT) CAPS TAKE 1 CAPSULE BY MOUTH DAILY 90 capsule 3    escitalopram (LEXAPRO) 10 MG tablet Take 10 mg by mouth every morning       furosemide (LASIX) 40 MG tablet every morning 40mg tab by mouth daily  1    levothyroxine (SYNTHROID/LEVOTHROID) 75 MCG tablet Take 75 mcg by mouth every morning.      Multiple Vitamins-Minerals (OCUVITE PRESERVISION PO) Take 1 tablet by mouth every evening       pravastatin (PRAVACHOL) 20 MG tablet Take 20 mg by mouth every evening  90 tablet 3    Probiotic Product (PROBIOTIC-10 PO) Take by mouth every morning          Allergies  Allergies   Allergen Reactions    Adhesive Tape      Other reaction(s): itching    Latex     Vioxx      Pt. Is unaware of any allergy to this med.       Social History  Social History     Socioeconomic History    Marital status:      Spouse name: Not on file    Number of children: Not on file    Years of education: Not on file    Highest education level: Not on file    Occupational History    Not on file   Tobacco Use    Smoking status: Never    Smokeless tobacco: Never   Substance and Sexual Activity    Alcohol use: No    Drug use: No    Sexual activity: Not on file   Other Topics Concern    Parent/sibling w/ CABG, MI or angioplasty before 65F 55M? Not Asked   Social History Narrative    lives in [place]. . Daughter Mary Ann Mijares.     Social Drivers of Health     Financial Resource Strain: Not on file   Food Insecurity: Not on file   Transportation Needs: Not on file   Physical Activity: Not on file   Stress: Not on file   Social Connections: Not on file   Interpersonal Safety: Not on file   Housing Stability: Not on file       Family History  Family History   Problem Relation Age of Onset    Parkinsonism Mother     Diabetes Father     Deep Vein Thrombosis Sister     Parkinsonism Sister     Connective Tissue Disorder Sister         tremor    Neurologic Disorder Brother         tremor    Neurologic Disorder Brother         tremor    Cancer Brother         gallbladder    Neurologic Disorder Brother         tremor    Cancer Brother         throat    Parkinsonism Other         niece    Parkinsonism Other         niece    Anesthesia Reaction No family hx of        Review of Systems  The complete review of systems is negative other than noted in the HPI or here.   Anesthesia Evaluation   Pt has had prior anesthetic. Type: General and MAC.    No history of anesthetic complications       ROS/MED HX  ENT/Pulmonary: Comment: Hearing loss   (-) tobacco use and recent URI   Neurologic: Comment: Essential tremor s/p DBS   (-) no seizures and no CVA   Cardiovascular: Comment: Chronic lower extremity lymphedema    (+) Dyslipidemia hypertension- -   -  - -   Taking blood thinners Pt has not received instructions: Instructions Given to patient: Planned 7 day hold for surgery.                   dysrhythmias, 1st Deg Heart Block,        Previous cardiac testing   Echo: Date:  "Results:    Stress Test:  Date: Results:    ECG Reviewed:  Date: 2022 Results:  SR  Cath:  Date: Results:   (-) CABRERA   METS/Exercise Tolerance: 1 - Eating, dressing Comment: Walking with walker   Hematologic:    (-) history of blood clots and history of blood transfusion   Musculoskeletal: Comment: Osteoporosis  Low back pain  (+)  arthritis,             GI/Hepatic:  - neg GI/hepatic ROS  (-) GERD   Renal/Genitourinary: Comment: Urinary incontinence - neg Renal ROS     Endo:     (+)          thyroid problem, hypothyroidism,    Obesity,       Psychiatric/Substance Use:     (+) psychiatric history anxiety and depression       Infectious Disease:  - neg infectious disease ROS     Malignancy:   (+) Malignancy, History of Skin.Skin CA Remission status post Surgery.      Other:  - neg other ROS          BP (!) 147/78 (BP Location: Right arm, Patient Position: Sitting, Cuff Size: Adult Regular)   Pulse 71   Temp 97.5  F (36.4  C) (Oral)   Resp 16   Ht 1.626 m (5' 4\")   Wt 78.2 kg (172 lb 4.8 oz)   SpO2 97%   BMI 29.58 kg/m      Physical Exam   Constitutional: Awake, alert, cooperative, no apparent distress, and appears stated age.  Accompanied by son  Eyes: Pupils equal, round and reactive to light, extra ocular muscles intact, sclera clear, conjunctiva normal.  Glasses  HENT: Normocephalic, oral pharynx with moist mucus membranes, good dentition. No goiter appreciated.  Wearing hearing aids  Respiratory: Clear to auscultation bilaterally, no crackles or wheezing.  No cough or obvious dyspnea  Cardiovascular: Regular rate and rhythm, normal S1 and S2, and no murmur noted. Carotids +2, no bruits.  2+, nonpitting bilateral lower edema.  Dry skin changes.  Palpable pulses to radial  DP and PT arteries.   GI: Normal bowel sounds, soft, non-distended, non-tender, no masses palpated  Lymph/Hematologic: No cervical lymphadenopathy and no supraclavicular lymphadenopathy.  Genitourinary: Deferred  Skin: Warm and dry.  "   Musculoskeletal: Full ROM of neck. There is no redness, warmth, or swelling of the joints. Gross motor strength is weakened  Neurologic: Awake, alert, oriented to name, place and time. Cranial nerves II-XII are grossly intact. Gait is cautious, uses walker  Neuropsychiatric: Calm, cooperative. Normal affect.     Prior Labs/Diagnostic Studies   All labs and imaging personally reviewed   Lab Results   Component Value Date    WBC 6.3 2025     Lab Results   Component Value Date    RBC 4.11 2025     Lab Results   Component Value Date    HGB 13.5 2025    HGB 13.4 2008     Lab Results   Component Value Date    HCT 41.4 2025     Lab Results   Component Value Date     2025     Lab Results   Component Value Date    MCH 32.8 2025     Lab Results   Component Value Date    MCHC 32.6 2025     Lab Results   Component Value Date    RDW 13.3 2025     Lab Results   Component Value Date     2025     Last Comprehensive Metabolic Panel:  Lab Results   Component Value Date     2025    POTASSIUM 4.6 2025    CHLORIDE 103 2025    CO2 29 2025    ANIONGAP 9 2025     (H) 2025    BUN 19.4 2025    CR 0.93 2025    GFRESTIMATED 57 (L) 2025    WERNER 9.4 2025     INR 1.01  PTT 32    EK Sinus rhythm      The patient's records and results personally reviewed by this provider.     Outside records reviewed from: Care Everywhere      Assessment    Jennifer Ferrera is a 89 year old female seen as a PAC referral for risk assessment and optimization for anesthesia.    Plan/Recommendations  Pt will be optimized for the proposed procedure.  See below for details on the assessment, risk, and preoperative recommendations    NEUROLOGY  - No history of TIA, CVA or seizure    -Post Op delirium risk factors:  Age    ENT  - No current airway concerns.  Will need to be reassessed day of surgery.  Mallampati: I  TM: >  "3    Anesthesia records available  Eating soft diet  The patient has a metal plate in her right jaw secondary to infection. She has all of her own teeth.     Hearing loss, wearing hearing aids.  Will need to speak loudly and be close to patient    CARDIAC  Hyperlipidemia. Pravastatin at bedtime.  BP range at home 120s/80s.  Patient reports history of rheumatic fever as a child.  Denies history of heart attack or valve related problems.  She denies chest pain, irregular heart rate or dyspnea on exertion.  Chronic bilateral lower extremity lymphedema.  Dry skin changes.  Will hold Lasix on day of surgery.  Planned 7-day hold for aspirin.  Per chart 1st degree AVB, seen on EKG on 9/10/19. She also did a zio patch in 7/2019 without significant arrhythmia.  Most recent EKG in 2022 showed sinus rhythm.  No further testing for low risk procedure.  Patient continues to live in her home, and is walking with a walker.  She has assistance from her family.  - METS (Metabolic Equivalents)<4    RCRI: 0.4% risk of serious cardiac events    PULMONARY  Denies asthma, cough or use of inhaler  Intermediate risk for YESICA  Lungs CTAB  - Tobacco History    History   Smoking Status    Never   Smokeless Tobacco    Never       GI: Denies GERD    PONV Medium Risk  Total Score: 2           1 AN PONV: Pt is Female    1 AN PONV: Patient is not a current smoker        /RENAL  - Baseline Creatinine  0.93  Urinary incontinence    ENDOCRINE    - BMI: Estimated body mass index is 29.58 kg/m  as calculated from the following:    Height as of this encounter: 1.626 m (5' 4\").    Weight as of this encounter: 78.2 kg (172 lb 4.8 oz).  Overweight (BMI 25.0-29.9)  - No history of Diabetes Mellitus  Hypothyroidism, followed by primary provider.  Will take levothyroxine on day of surgery  TSH 3.45    HEME: VTE risk 1.8%  Sister with DVT  Denies personal history of PE/DVT  Denies history of blood transfusion  Recent labs by Dr. Collins above    MSK  Patient IS " Frail             Total Score: 4    Frailty: Slower walking speed    Frailty: Decrease in strength    Frailty: Increased exhaustion    Frailty: Overall lack of energy      OA  Osteoporosis    PSYCH  - Anxiety/depression.  Will take Lexapro on day of surgery    Different anesthesia methods/types have been discussed with the patient, but they are aware that the final plan will be decided by the assigned anesthesia provider on the date of service.  Patient was discussed with Dr Santana    The patient is optimized for their procedure. AVS with information on surgery time/arrival time, meds and NPO status given by nursing staff. No further diagnostic testing indicated.      On the day of service:     Prep time: 13 minutes  Visit time: 12 minutes  Documentation time: 13 minutes  ------------------------------------------  Total time: 38 minutes      IGNACIO Blackmon CNS  Preoperative Assessment Center  [facility]  Clinic and Surgery Center  Phone: [phone]  Fax: [phone]

## 2025-01-29 NOTE — PATIENT INSTRUCTIONS
Preparing for Your Surgery      Name:  Jennifer Ferrera   MRN:  [id number]   :  1931   Today's Date:  2025       Arriving for surgery:  Surgery date:  2025  Arrival time:  11:30 AM    Please come to:     Please come to:       [facility]    [address] [place]  [address]     The [facility] ([facility] Patient/Visitor Ramp is at [address]. Patients and visitors who self-park will receive the reduced hospital parking rate. If the Patient /Visitor Ramp is full, please follow the signs to the [employer] car park located at the main hospital entrance.       parking is available (24 hours/ 7 days a week)      Discounted parking pass options are available for patients and visitors. They can be purchased at the [employer] desk at the main hospital entrance.     -    Stop at the security desk and they will direct surgery patients to the Surgery Check in and [facility] [place] [place]. [phone]        - If you need directions, a wheelchair or an escort please stop at the Information/security desk in the lobby.     What can I eat or drink?  -  You may eat and drink normally up to 8 hours prior to arrival time. (Until 3:30 AM)  -  You may have clear liquids until 2 hours prior to arrival time. (Until 9:30 AM)    Examples of clear liquids:  Water  Clear broth  Juices (apple, white grape, white cranberry  and cider) without pulp  Noncarbonated, powder based beverages  (lemonade and Jose D-Aid)  Sodas (Sprite, 7-Up, ginger ale and seltzer)  Coffee or tea (without milk or cream)  Gatorade    -  No Alcohol or cannabis products for at least 24 hours before surgery.     Which medicines can I take?    **Hold Aspirin for 7 days before surgery.   **Hold Multivitamins for 7 days before surgery.  Hold Supplements for 7 days before surgery.  Hold Ibuprofen (Advil, Motrin) for 1 day(s) before surgery--unless  otherwise directed by surgeon.  Hold Naproxen (Aleve) for 4 days before surgery.    -  DO NOT take these medications the day of surgery:  Probiotic  Lasix  Vitamin D3    -  PLEASE TAKE these medications the day of surgery:  Lexapro  Eye drops if needed  Levothyroxine    **Can take evening medication (Pravastatin)    How do I prepare myself?  - Please take 2 showers (one the night prior to surgery and one the morning of surgery) using Scrubcare or Hibiclens soap.    Use this soap only from the neck to your toes. Avoid genital area      Leave the soap on your skin for one minute--then rinse thoroughly.      You may use your own shampoo and conditioner. No other hair products.   - Please remove all jewelry and body piercings.  - No lotions, deodorants or fragrance.  - No makeup or fingernail polish.   - Bring your ID and insurance card.    -If you use a CPAP machine, please bring the CPAP machine, tubing, and mask to hospital.    -If you have a Deep Brain Stimulator, Spinal Cord Stimulator, or any Neuro Stimulator device---you must bring the remote control to the hospital.      ALL PATIENTS GOING HOME THE SAME DAY OF SURGERY ARE REQUIRED TO HAVE A RESPONSIBLE ADULT TO DRIVE AND BE IN ATTENDANCE WITH THEM FOR 24 HOURS FOLLOWING SURGERY.    Covid testing policy as of 12/06/2022  Your surgeon will notify and schedule you for a COVID test if one is needed before surgery--please direct any questions or COVID symptoms to your surgeon      Questions or Concerns:    - For any questions regarding the day of surgery or your hospital stay, please contact the Pre Admission Nursing Office at [phone].       - If you have health changes between today and your surgery, please call your surgeon.       - For questions after surgery, please call your surgeons office.           Current Visitor Guidelines    You may have 2 visitors in the pre op area.    Visiting hours: 8 a.m. to 8:30 p.m.    Patients confirmed or suspected to have  symptoms of COVID 19 or flu:     No visitors allowed for adult patients.   Children (under age 18) can have 1 named visitor.     People who are sick or showing symptoms of COVID 19 or flu:    Are not allowed to visit patients--we can only make exceptions in special situations.       Please follow these guidelines for your visit:          Please maintain social distance          Masking is optional--however at times you may be asked to wear a mask for the safety of yourself and others     Clean your hands with alcohol hand . Do this when you arrive at and leave the building and patient room,    And again after you touch your mask or anything in the room.     Go directly to and from the room you are visiting.     Stay in the patient s room during your visit. Limit going to other places in the hospital as much as possible     Leave bags and jackets at home or in the car.     For everyone s health, please don t come and go during your visit. That includes for smoking   during your visit.

## 2025-02-12 RX ORDER — ONDANSETRON 2 MG/ML
4 INJECTION INTRAMUSCULAR; INTRAVENOUS EVERY 30 MIN PRN
Status: CANCELLED | OUTPATIENT
Start: 2025-02-12

## 2025-02-12 RX ORDER — HYDROMORPHONE HYDROCHLORIDE 1 MG/ML
0.2 INJECTION, SOLUTION INTRAMUSCULAR; INTRAVENOUS; SUBCUTANEOUS EVERY 5 MIN PRN
Status: CANCELLED | OUTPATIENT
Start: 2025-02-12

## 2025-02-12 RX ORDER — HYDROMORPHONE HYDROCHLORIDE 1 MG/ML
0.4 INJECTION, SOLUTION INTRAMUSCULAR; INTRAVENOUS; SUBCUTANEOUS EVERY 5 MIN PRN
Status: CANCELLED | OUTPATIENT
Start: 2025-02-12

## 2025-02-12 RX ORDER — ONDANSETRON 4 MG/1
4 TABLET, ORALLY DISINTEGRATING ORAL EVERY 30 MIN PRN
Status: CANCELLED | OUTPATIENT
Start: 2025-02-12

## 2025-02-12 RX ORDER — FENTANYL CITRATE 50 UG/ML
50 INJECTION, SOLUTION INTRAMUSCULAR; INTRAVENOUS EVERY 5 MIN PRN
Status: CANCELLED | OUTPATIENT
Start: 2025-02-12

## 2025-02-12 RX ORDER — SODIUM CHLORIDE, SODIUM LACTATE, POTASSIUM CHLORIDE, CALCIUM CHLORIDE 600; 310; 30; 20 MG/100ML; MG/100ML; MG/100ML; MG/100ML
INJECTION, SOLUTION INTRAVENOUS CONTINUOUS
Status: CANCELLED | OUTPATIENT
Start: 2025-02-12

## 2025-02-12 RX ORDER — DEXAMETHASONE SODIUM PHOSPHATE 4 MG/ML
4 INJECTION, SOLUTION INTRA-ARTICULAR; INTRALESIONAL; INTRAMUSCULAR; INTRAVENOUS; SOFT TISSUE
Status: CANCELLED | OUTPATIENT
Start: 2025-02-12

## 2025-02-12 RX ORDER — NALOXONE HYDROCHLORIDE 0.4 MG/ML
0.1 INJECTION, SOLUTION INTRAMUSCULAR; INTRAVENOUS; SUBCUTANEOUS
Status: CANCELLED | OUTPATIENT
Start: 2025-02-12

## 2025-02-12 RX ORDER — FENTANYL CITRATE 50 UG/ML
25 INJECTION, SOLUTION INTRAMUSCULAR; INTRAVENOUS EVERY 5 MIN PRN
Status: CANCELLED | OUTPATIENT
Start: 2025-02-12

## 2025-02-12 ASSESSMENT — LIFESTYLE VARIABLES: TOBACCO_USE: 0

## 2025-02-12 ASSESSMENT — ENCOUNTER SYMPTOMS
SEIZURES: 0
DYSRHYTHMIAS: 1

## 2025-02-13 ENCOUNTER — HOSPITAL ENCOUNTER (OUTPATIENT)
Facility: CLINIC | Age: OVER 89
Discharge: HOME | End: 2025-02-13
Attending: NEUROLOGICAL SURGERY | Admitting: NEUROLOGICAL SURGERY
Payer: COMMERCIAL

## 2025-02-13 ENCOUNTER — ANESTHESIA (OUTPATIENT)
Dept: SURGERY | Facility: CLINIC | Age: OVER 89
End: 2025-02-13
Payer: COMMERCIAL

## 2025-02-13 VITALS
DIASTOLIC BLOOD PRESSURE: 81 MMHG | TEMPERATURE: 97.7 F | WEIGHT: 171.96 LBS | HEIGHT: 63 IN | RESPIRATION RATE: 13 BRPM | SYSTOLIC BLOOD PRESSURE: 143 MMHG | BODY MASS INDEX: 30.47 KG/M2 | OXYGEN SATURATION: 100 %

## 2025-02-13 DIAGNOSIS — G25.0 ESSENTIAL TREMOR: Primary | ICD-10-CM

## 2025-02-13 PROCEDURE — 250N000011 HC RX IP 250 OP 636: Performed by: NEUROLOGICAL SURGERY

## 2025-02-13 PROCEDURE — 61885 INSRT/REDO NEUROSTIM 1 ARRAY: CPT | Mod: 50 | Performed by: NEUROLOGICAL SURGERY

## 2025-02-13 PROCEDURE — 258N000003 HC RX IP 258 OP 636

## 2025-02-13 PROCEDURE — 370N000017 HC ANESTHESIA TECHNICAL FEE, PER MIN: Performed by: NEUROLOGICAL SURGERY

## 2025-02-13 PROCEDURE — C1767 GENERATOR, NEURO NON-RECHARG: HCPCS | Performed by: NEUROLOGICAL SURGERY

## 2025-02-13 PROCEDURE — 710N000012 HC RECOVERY PHASE 2, PER MINUTE: Performed by: NEUROLOGICAL SURGERY

## 2025-02-13 PROCEDURE — 272N000001 HC OR GENERAL SUPPLY STERILE: Performed by: NEUROLOGICAL SURGERY

## 2025-02-13 PROCEDURE — 999N000141 HC STATISTIC PRE-PROCEDURE NURSING ASSESSMENT: Performed by: NEUROLOGICAL SURGERY

## 2025-02-13 PROCEDURE — 360N000076 HC SURGERY LEVEL 3, PER MIN: Performed by: NEUROLOGICAL SURGERY

## 2025-02-13 PROCEDURE — 250N000011 HC RX IP 250 OP 636

## 2025-02-13 DEVICE — NEUROSTIMULATOR MEDT ACTIVA SC 37602: Type: IMPLANTABLE DEVICE | Site: CHEST  WALL | Status: FUNCTIONAL

## 2025-02-13 RX ORDER — SODIUM CHLORIDE, SODIUM LACTATE, POTASSIUM CHLORIDE, CALCIUM CHLORIDE 600; 310; 30; 20 MG/100ML; MG/100ML; MG/100ML; MG/100ML
INJECTION, SOLUTION INTRAVENOUS CONTINUOUS PRN
Status: DISCONTINUED | OUTPATIENT
Start: 2025-02-13 | End: 2025-02-13

## 2025-02-13 RX ORDER — OXYCODONE HYDROCHLORIDE 5 MG/1
5 TABLET ORAL
Status: DISCONTINUED | OUTPATIENT
Start: 2025-02-13 | End: 2025-02-13 | Stop reason: HOSPADM

## 2025-02-13 RX ORDER — ONDANSETRON 4 MG/1
4 TABLET, ORALLY DISINTEGRATING ORAL EVERY 30 MIN PRN
Status: DISCONTINUED | OUTPATIENT
Start: 2025-02-13 | End: 2025-02-13 | Stop reason: HOSPADM

## 2025-02-13 RX ORDER — NALOXONE HYDROCHLORIDE 0.4 MG/ML
0.1 INJECTION, SOLUTION INTRAMUSCULAR; INTRAVENOUS; SUBCUTANEOUS
Status: DISCONTINUED | OUTPATIENT
Start: 2025-02-13 | End: 2025-02-13 | Stop reason: HOSPADM

## 2025-02-13 RX ORDER — CEFAZOLIN SODIUM/WATER 2 G/20 ML
2 SYRINGE (ML) INTRAVENOUS
Status: COMPLETED | OUTPATIENT
Start: 2025-02-13 | End: 2025-02-13

## 2025-02-13 RX ORDER — ONDANSETRON 2 MG/ML
4 INJECTION INTRAMUSCULAR; INTRAVENOUS EVERY 30 MIN PRN
Status: DISCONTINUED | OUTPATIENT
Start: 2025-02-13 | End: 2025-02-13 | Stop reason: HOSPADM

## 2025-02-13 RX ORDER — DEXAMETHASONE SODIUM PHOSPHATE 4 MG/ML
4 INJECTION, SOLUTION INTRA-ARTICULAR; INTRALESIONAL; INTRAMUSCULAR; INTRAVENOUS; SOFT TISSUE
Status: DISCONTINUED | OUTPATIENT
Start: 2025-02-13 | End: 2025-02-13 | Stop reason: HOSPADM

## 2025-02-13 RX ORDER — CEFAZOLIN SODIUM/WATER 2 G/20 ML
2 SYRINGE (ML) INTRAVENOUS SEE ADMIN INSTRUCTIONS
Status: DISCONTINUED | OUTPATIENT
Start: 2025-02-13 | End: 2025-02-13 | Stop reason: HOSPADM

## 2025-02-13 RX ORDER — LIDOCAINE 40 MG/G
CREAM TOPICAL
Status: DISCONTINUED | OUTPATIENT
Start: 2025-02-13 | End: 2025-02-13 | Stop reason: HOSPADM

## 2025-02-13 RX ORDER — OXYCODONE HYDROCHLORIDE 10 MG/1
10 TABLET ORAL
Status: DISCONTINUED | OUTPATIENT
Start: 2025-02-13 | End: 2025-02-13 | Stop reason: HOSPADM

## 2025-02-13 RX ORDER — ASPIRIN 81 MG/1
81 TABLET, CHEWABLE ORAL EVERY MORNING
COMMUNITY
Start: 2025-02-20

## 2025-02-13 RX ORDER — SODIUM CHLORIDE, SODIUM LACTATE, POTASSIUM CHLORIDE, CALCIUM CHLORIDE 600; 310; 30; 20 MG/100ML; MG/100ML; MG/100ML; MG/100ML
INJECTION, SOLUTION INTRAVENOUS CONTINUOUS
Status: DISCONTINUED | OUTPATIENT
Start: 2025-02-13 | End: 2025-02-13 | Stop reason: HOSPADM

## 2025-02-13 RX ORDER — CEPHALEXIN 500 MG/1
500 CAPSULE ORAL 3 TIMES DAILY
Qty: 21 CAPSULE | Refills: 0 | Status: SHIPPED | OUTPATIENT
Start: 2025-02-13 | End: 2025-02-20

## 2025-02-13 RX ORDER — PROPOFOL 10 MG/ML
INJECTION, EMULSION INTRAVENOUS PRN
Status: DISCONTINUED | OUTPATIENT
Start: 2025-02-13 | End: 2025-02-13

## 2025-02-13 RX ORDER — PROPOFOL 10 MG/ML
INJECTION, EMULSION INTRAVENOUS CONTINUOUS PRN
Status: DISCONTINUED | OUTPATIENT
Start: 2025-02-13 | End: 2025-02-13

## 2025-02-13 RX ADMIN — Medication 2 G: at 13:28

## 2025-02-13 RX ADMIN — SODIUM CHLORIDE, POTASSIUM CHLORIDE, SODIUM LACTATE AND CALCIUM CHLORIDE: 600; 310; 30; 20 INJECTION, SOLUTION INTRAVENOUS at 13:25

## 2025-02-13 RX ADMIN — PROPOFOL 10 MG: 10 INJECTION, EMULSION INTRAVENOUS at 13:28

## 2025-02-13 RX ADMIN — PROPOFOL 10 MG: 10 INJECTION, EMULSION INTRAVENOUS at 14:02

## 2025-02-13 RX ADMIN — PROPOFOL 25 MCG/KG/MIN: 10 INJECTION, EMULSION INTRAVENOUS at 13:30

## 2025-02-13 ASSESSMENT — ACTIVITIES OF DAILY LIVING (ADL)
ADLS_ACUITY_SCORE: 41
ADLS_ACUITY_SCORE: 44

## 2025-02-13 NOTE — ANESTHESIA PREPROCEDURE EVALUATION
Anesthesia Pre-Procedure Evaluation    Patient: Jennifer Ferrera   MRN: [id number] : 1931        Procedure : Procedure(s):  Replacement of deep brain stimulator generator/battery over bilateral chest wall  **Latex Allergy**          Past Medical History:   Diagnosis Date    1st degree AV block 10/20/2015    ECG shows mild sinus bradycardia with 1st degree heart block and min voltage criteria for LVH; borderline ECG    Anxiety     Dysarthria     Essential tremor     Facial numbness     Hearing loss     History of rheumatic fever as a child     Hypercholesterolemia     Hypertension     Low back pain     Lymphedema     Macular degeneration (senile) of retina     Memory loss     Mood change     Osteoporosis, unspecified     PSEUDOBULBAR AFFECT     Recurrent falls     Skin cancer     Tremor     Urinary incontinence, urge     Urinary urgency     Wears glasses       Past Surgical History:   Procedure Laterality Date    CHOLECYSTECTOMY      Excision of basal cell carcinoma from RUE  Sep 2015    right shoulder    EXTRACAPSULAR CATARACT EXTRATION WITH INTRAOCULAR LENS IMPLANT      EXTRACAPSULAR CATARACT EXTRATION WITH INTRAOCULAR LENS IMPLANT      HYSTERECTOMY      IR PICC PLACEMENT > 5 YRS OF AGE  2019    REPLACE DEEP BRAIN STIMULATION GENERATOR / BATTERY Bilateral 2020    Procedure: Replacement of deep brain stimulator generator/battery over bilateral chest wall  **Latex Allergy**;  Surgeon: Noe Collins MD;  Location: [facility] OR    REPLACE DEEP BRAIN STIMULATION GENERATOR / BATTERY BILATERAL Bilateral 2016    Procedure: REPLACE DEEP BRAIN STIMULATION GENERATOR / BATTERY BILATERAL;  Surgeon: Bentley Martinez MD;  Location: [facility] OR    REPLACE PULSE GENERATOR SUBCUTANEOUS BILATERAL  2008    Replaced both soletra    REPLACE PULSE GENERATOR SUBCUTANEOUS BILATERAL  2013    Procedure: REPLACE PULSE GENERATOR SUBCUTANEOUS BILATERAL;  Bilateral Deep Brain Stimulator Battery Exchange ;   Surgeon: Bentley Martinez MD;  Location: [facility] OR    Right DBS Lead removal  jan 2003    infected right lead removed; was in a nursing home on antiobiotics    STEREOTACTIC INSERTION DEEP BRAIN STIMULATION  12 May 2003    placed lead in may after removal of right thalamic  DBS lead due to infection feb 17 2003    STEREOTACTIC INSERTION DEEP BRAIN STIMULATION  9 jun 2003    revision of right DBS electrode    STEREOTACTIC INSERTION DEEP BRAIN STIMULATION  oct 2002    left thalamic DBS lead placed    STEREOTACTIC INSERTION DEEP BRAIN STIMULATION  oct 2002    right thalamic DBS lead placed      Allergies   Allergen Reactions    Adhesive Tape      Other reaction(s): itching    Latex     Vioxx      Pt. Is unaware of any allergy to this med.      Social History     Tobacco Use    Smoking status: Never    Smokeless tobacco: Never   Substance Use Topics    Alcohol use: No      Wt Readings from Last 1 Encounters:   01/29/25 78.2 kg (172 lb 4.8 oz)        Anesthesia Evaluation   Pt has had prior anesthetic. Type: General and MAC.    No history of anesthetic complications       ROS/MED HX  ENT/Pulmonary: Comment: Hearing loss   (-) tobacco use and recent URI   Neurologic: Comment: Essential tremor s/p DBS   (-) no seizures and no CVA   Cardiovascular: Comment: Chronic lower extremity lymphedema    (+) Dyslipidemia hypertension- -   -  - -   Taking blood thinners Pt has not received instructions: Instructions Given to patient: Planned 7 day hold for surgery.                   dysrhythmias, 1st Deg Heart Block,        Previous cardiac testing   Echo: Date: Results:    Stress Test:  Date: Results:    ECG Reviewed:  Date: 2022 Results:  SR  Cath:  Date: Results:   (-) CABRERA   METS/Exercise Tolerance: 1 - Eating, dressing Comment: Walking with walker   Hematologic:    (-) history of blood clots and history of blood transfusion   Musculoskeletal: Comment: Osteoporosis  Low back pain  (+)  arthritis,             GI/Hepatic:  - neg  GI/hepatic ROS  (-) GERD   Renal/Genitourinary: Comment: Urinary incontinence - neg Renal ROS     Endo:     (+)          thyroid problem, hypothyroidism,    Obesity,       Psychiatric/Substance Use:     (+) psychiatric history anxiety and depression       Infectious Disease:  - neg infectious disease ROS     Malignancy:   (+) Malignancy, History of Skin.Skin CA Remission status post Surgery.      Other:  - neg other ROS             OUTSIDE LABS:  CBC:   Lab Results   Component Value Date    WBC 6.3 01/20/2025    WBC 4.5 01/02/2022    HGB 13.5 01/20/2025    HGB 12.7 01/02/2022    HCT 41.4 01/20/2025    HCT 37.9 01/02/2022     01/20/2025     01/02/2022     BMP:   Lab Results   Component Value Date     01/20/2025     10/02/2024    POTASSIUM 4.6 01/20/2025    POTASSIUM 4.0 10/02/2024    CHLORIDE 103 01/20/2025    CHLORIDE 103 10/02/2024    CO2 29 01/20/2025    CO2 27 10/02/2024    BUN 19.4 01/20/2025    BUN 24.4 (H) 10/02/2024    CR 0.93 01/20/2025    CR 0.86 10/02/2024     (H) 01/20/2025    GLC 98 10/02/2024     COAGS:   Lab Results   Component Value Date    PTT 32 01/20/2025    INR 1.01 01/20/2025     POC:   Lab Results   Component Value Date    BGM 90 12/29/2020     HEPATIC:   Lab Results   Component Value Date    ALBUMIN 3.4 (L) 06/18/2021    PROTTOTAL 5.5 (L) 06/18/2021    ALT 21 06/18/2021    AST 33 06/18/2021    ALKPHOS 78 06/18/2021    BILITOTAL 0.6 06/18/2021     OTHER:   Lab Results   Component Value Date    A1C 5.4 01/17/2019    WERNER 9.4 01/20/2025    TSH 3.45 11/05/2024    CRP 0.2 07/15/2019       Anesthesia Plan    ASA Status:  3    NPO Status:  Will be NPO Appropriate at ...    Anesthesia Type: MAC.     - Reason for MAC: straight local not clinically adequate   Induction: Intravenous.   Maintenance: TIVA.        Consents            Postoperative Care    Pain management: Multi-modal analgesia.   PONV prophylaxis: Ondansetron (or other 5HT-3)     Comments:                "Carolina Mulligan, DO    I have reviewed the pertinent notes and labs in the chart from the past 30 days and (re)examined the patient.  Any updates or changes from those notes are reflected in this note.    Clinically Significant Risk Factors Present on Admission                   # Hypertension: Noted on problem list           # Overweight: Estimated body mass index is 29.58 kg/m  as calculated from the following:    Height as of 1/29/25: 1.626 m (5' 4\").    Weight as of 1/29/25: 78.2 kg (172 lb 4.8 oz).                "

## 2025-02-13 NOTE — ANESTHESIA POSTPROCEDURE EVALUATION
Patient: Jennifer Ferrera    Procedure: Procedure(s):  Replacement of deep brain stimulator generator/battery over bilateral chest wall  **Latex Allergy**       Anesthesia Type:  MAC    Note:  Disposition: Outpatient   Postop Pain Control: Uneventful            Sign Out: Well controlled pain   PONV: No   Neuro/Psych: Uneventful            Sign Out: Acceptable/Baseline neuro status   Airway/Respiratory: Uneventful            Sign Out: Acceptable/Baseline resp. status   CV/Hemodynamics: Uneventful            Sign Out: Acceptable CV status; No obvious hypovolemia; No obvious fluid overload   Other NRE: NONE   DID A NON-ROUTINE EVENT OCCUR? No       Last vitals:  Vitals Value Taken Time   /81 02/13/25 1536   Temp 36.5  C (97.7  F) 02/13/25 1507   Pulse     Resp 13 02/13/25 1507   SpO2 100 % 02/13/25 1610   Vitals shown include unfiled device data.    Electronically Signed By: Cash Baires MD  February 13, 2025  4:11 PM

## 2025-02-13 NOTE — BRIEF OP NOTE
[facility]    Brief Operative Note    Pre-operative diagnosis: End of battery life of deep brain stimulator [Z45.42]  Status post deep brain stimulator placement [Z96.89]  Tremor [R25.1]  Post-operative diagnosis Same as pre-operative diagnosis    Procedure: Replacement of deep brain stimulator generator/battery over bilateral chest wall  **Latex Allergy**, Bilateral - Chest    Surgeon: Surgeons and Role:     * Noe Collins MD - Primary     * Ted Rhodes MD - Resident - Assisting  Anesthesia: MAC with Local   Estimated Blood Loss: 2 ml    Drains: None  Specimens: * No specimens in log *  Findings:   Intact impedence for both batteries prior to closure.  Complications: None.  Implants:   Implant Name Type Inv. Item Serial No.  Lot No. LRB No. Used Action   NEUROSTIMULATOR MEDT ACTIVA SC [id number] Neurology device NEUROSTIMULATOR MEDT ACTIVA SC 75717 [id number] [employer]NEURO  Right 1 Explanted   NEUROSTIMULATOR MEDT ACTIVA SC 50480 - [id number] Neurology device NEUROSTIMULATOR MEDT ACTIVA SC 46023 [id number] [employer]  Right 1 Implanted   NEUROSTIMULATOR MEDT ACTIVA SC 50178 Neurology device NEUROSTIMULATOR MEDT ACTIVA SC 63419 [id number] [employer]NEURO  Left 1 Explanted   NEUROSTIMULATOR MEDT ACTIVA SC 82771 - [id number] Neurology device NEUROSTIMULATOR MEDT ACTIVA SC 25346 [id number] [employer]  Left 1 Implanted     Ted Rhodes MD  Neurosurgery  Pager: [phone]

## 2025-02-13 NOTE — DISCHARGE INSTRUCTIONS
Contacting your Doctor -   To contact Dr. Collins's office call [phone]  or:  [phone] and ask for the resident on call for Neurology (answered 24 hours a day)   Emergency Department:  [facility]: [phone]  [facility]: [phone] 911 if you are in need of immediate or emergent help

## 2025-02-13 NOTE — ANESTHESIA CARE TRANSFER NOTE
Patient: Jennifer Ferrera    Procedure: Procedure(s):  Replacement of deep brain stimulator generator/battery over bilateral chest wall  **Latex Allergy**       Diagnosis: End of battery life of deep brain stimulator [Z45.42]  Status post deep brain stimulator placement [Z96.89]  Tremor [R25.1]  Diagnosis Additional Information: No value filed.    Anesthesia Type:   MAC     Note:    Oropharynx: oropharynx clear of all foreign objects  Level of Consciousness: awake  Oxygen Supplementation: room air    Independent Airway: airway patency satisfactory and stable  Dentition: dentition unchanged  Vital Signs Stable: post-procedure vital signs reviewed and stable  Report to RN Given: handoff report given  Patient transferred to: Phase II          Vitals:  Vitals Value Taken Time   /81 02/13/25 1536   Temp 36.5  C (97.7  F) 02/13/25 1507   Pulse     Resp 13 02/13/25 1507   SpO2 100 % 02/13/25 1615   Vitals shown include unfiled device data.    Electronically Signed By: Víctor Jones MD  February 13, 2025  4:36 PM    Carolina Mulligan DO

## 2025-02-13 NOTE — OR NURSING
Pt reports finished a bottle of ensure @ 0700 today.  This information reported to NICHOL Ramirez and OR control dest @ 1030. 02/13/25

## 2025-02-26 NOTE — OP NOTE
PATIENT NAME: RIANNA WALTERS  YOB: 1931  MRN:   [id number]  ACCOUNT:  [id number]      DATE OF PROCEDURE:  02/13/2025    PREOPERATIVE DIAGNOSIS:  1.  Essential tremor  2.  Status post bilateral DBS placement, October of 2002  3.  Status post removal and replacement of right side DBS due to infection in 2003  4.  Status post numerous replacement of bilateral DBS generators/batteries over bilateral chest wall, in 7/29/2008, 7/2/2013, 11/8/2016 and last replacement in 12/29/2020  5.  Depleted deep brain stimulator generators/batteries, model Activa SC, over the left and right chest wall    POSTOPERATIVE DIAGNOSIS:  1.  Essential tremor  2.  Status post bilateral DBS placement, October of 2002  3.  Status post removal and replacement of right side DBS due to infection in 2003  4.  Status post numerous replacement of bilateral DBS generators/batteries over bilateral chest wall, in 7/29/2008, 7/2/2013, 11/8/2016 and last replacement in 12/29/2020  5.  Depleted deep brain stimulator generators/batteries, model Activa SC, over the left and right chest wall    PROCEDURES PERFORMED:  1.  Replacement of deep brain stimulator generators/batteries, model Activa SC, over the left and right chest wall with connection to two electrode arrays.  2.  Revision of the left and right chest wall generator/battery pockets.  3.  Electrical interrogation and analysis of the left and right side deep brain stimulator systems.    ATTENDING SURGEON:  Noe Collins MD, PhD.    ASSISTANT SURGEON:  Ted Rhodes MD    ANESTHESIA:  Monitored anesthesia care and local anesthetic.    ESTIMATED BLOOD LOSS:  2 mL.    COMPLICATIONS:  None.    FINDINGS:  No sign of infection.  No sign of hardware breakage or damage.  With new generators/batteries connected, all the impedance values were within normal.  No problems were found.    EXPLANTS:  [employer] Model Activa SC generators/batteries, Model #39742.  Left side - S/N [id number], Right side -  S/N [id number].    IMPLANTS:  Medtronic Model Activa SC generators/batteries, Model #52253.  Left side - S/N [id number], Right side - S/N [id number].    INDICATIONS FOR PROCEDURE:  Ms. Jennifer Ferrera is a 89 year old female with long standing tremor.  She underwent bilateral DBS placement back in October of 2002 with electrodes in the bilateral ventral intermediate nucleus of the thalamus.  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  Since then, she has had bilateral DBS system with numerous generators/batteries being replaced.  She had the replacement surgeries in 7/19/2008, 7/2/2013, 11/8/2016 and 12/29/2020.  Her last replacement surgery was with me.  It appears that her generators/batteries last her about four years now.  She is followed by Dr. Neves and she was seen on 12/19/2024.   On 12/27/2024, her generators/batteries were noted to be at PRABHAKAR.  The left side generator/battery was at 2.54 Volts and the right side generator/battery was at 2.62 Volts.  Patient states that she is in her usual state of health.  She denies any recent illness.  She does struggle with bilateral lower extremity edema and she has a history of open wound but she states that she has no ulcers.  She did have fluid in the lower extremities but not recently.  There is no report of active infection.  She does take a baby aspirin, 81 mg, daily and she took a dose today.  She is not on any other anticoagulants or antiplatelet therapy.  In terms of her device, she has the DBS on all the time.  She is getting more tremors recently.  She denies any exposed hardware anywhere.  She also denies any abnormal shocks or undesirable stimulation effects.  She is also ok with the current locations of the generators/batteries.  She has bilateral Activa SC, model 52237.  She gets both of them replaced at the same time.  We discussed the surgical procedure for replacing the DBS generators/batteries over the bilateral chest  wall.  She currently has the [employer] generators/batteries, and they will be maintained during the upcoming procedure.  Risks, benefits, alternative therapies were discussed with the patient, including but not limited to infection and bleeding.  This surgical procedure will be performed under MAC with local anesthetic.  The thinned part of the wound/pocket may be corrected with mobilization of the tissue under the incision. The pocket may need to be enlarged to minimize the stress on the closure.  Surgical procedure was discussed in detail.  All questions were answered, and she and her son expressed understanding.  Of note, in the preop area, patient seemed confused.  However, patient's son confirmed the procedure and we will proceed with the planned surgery.    DESCRIPTION OF PROCEDURE:  Informed consent was obtained from the patient and her left and right chest was marked, specifically the previous incisions.  She was brought to the operating theater and was laid in a supine position.  Her bilateral arms were tucked.  All pressure points were padded appropriately.  Monitored anesthesia care was induced and maintained throughout the entire case.  Her scars and previous incisions over the left and right chest wall were prepped and draped in the usual sterile fashion.  Time out was performed confirming the patient's identity, procedure to be performed, site and side of the surgery and the administration of prophylactic preoperative antibiotic.  Lidocaine 1% with 1:100,000 epinephrine mixed with Marcaine 1/4% plain, 50:50 mix, was injected in the subcutaneous space at the intended incision sites, which are the previously well healed incisions.  Total of 38 mL was used.    We turned our attention to the left side first.  We used a #10 blade scalpel to make the skin incision, going over the previously well healed scar.  We carried our dissection through the dermal layer until we reached the subcutaneous fat and then  the generator/battery capsule.  The monopolar cautery was used to dissect the subcutaneous tissue and #10 blade scalpel to open the capsule, taking great care not to damage the hardware.  We gently opened up the fibrous capsule surrounding the generator/battery to expose it.  Care was taken to open the capsule while lifting the capsule itself away from the generator/battery, taking great care not to come in contact with the metal casing or the wire.  The generator/battery was mobile and we were able to remove it easily, taking care to attend to the location of the wire.  The scar surrounding and anchoring the extension wire was carefully dissected and wire freed.  The extension wire was examined and was found to be without any damage or erosion or defect.  There were no obvious signs of infection.    We then performed blunt and sharp dissection within the pocket after the generator/battery had been removed in order to revise the chest wall pocket for the new generator/battery, so as to reduce the tension on the wound closure.  Therefore, the pocket was generously enlarged.  We also dissected free and undermined the superior aspect of the pocket so that the closure would have less tension after closure and more subcutaneous tissue would be available.  The entire cavity was copiously irrigated with normal saline and meticulous hemostasis was obtained and the pocket was dried.      The old generator/battery, Activa SC, was disconnected from the extension wire and taken off the field.  The connector contacts were cleaned.  The new generator/battery, Medtronic model Activa SC, was connected to the extension wire.  Therefore, one electrode array was connected to generator/battery and secured.  And then the new generator/battery was placed back into the pocket along with the excess extension wire being placed posteriorly and around the periphery of the generator/battery.  The generator/battery was secured to the new  position within the pocket using two 2-0 Ethibond sutures.    We turned our attention to the right side next.  We used a #10 blade scalpel to make the skin incision, going over the previously well healed scar.  We carried our dissection through the dermal layer until we reached the subcutaneous fat and then the generator/battery capsule.  The monopolar cautery was used to dissect the subcutaneous tissue and #10 blade scalpel to open the capsule, taking great care not to damage the hardware.  We gently opened up the fibrous capsule surrounding the generator/battery to expose it.  Care was taken to open the capsule while lifting the capsule itself away from the generator/battery, taking great care not to come in contact with the metal casing or the wire.  The generator/battery was mobile and we were able to remove it easily, taking care to attend to the location of the wire.  The scar surrounding and anchoring the extension wire was carefully dissected and wire freed.  The extension wire was examined and was found to be without any damage or erosion or defect.  There were no obvious signs of infection.    We then performed blunt and sharp dissection within the pocket after the generator/battery had been removed in order to revise the chest wall pocket for the new generator/battery, so as to reduce the tension on the wound closure.  Therefore, the pocket was generously enlarged.  We also dissected free and undermined the superior aspect of the pocket so that the closure would have less tension after closure and more subcutaneous tissue would be available.  The entire cavity was copiously irrigated with normal saline and meticulous hemostasis was obtained and the pocket was dried.      The old generator/battery, Activa SC, was disconnected from the extension wire and taken off the field.  The connector contacts were cleaned.  The new generator/battery, Medtronic model Activa SC, was connected to the extension wire.   Therefore, one electrode array was connected to generator/battery and secured.  And then the new generator/battery was placed back into the pocket along with the excess extension wire being placed posteriorly and around the periphery of the generator/battery.  The generator/battery was secured to the new position within the pocket using two 2-0 Ethibond sutures.    After the replacements, two electrode arrays were connected to the generators/batteries.  The right side DBS system was tested and interrogated electrically and was found to be working well and impedance values were noted to be within normal.  The left side DBS system was tested and interrogated electrically and was found to be working well and impedance values were noted to be within normal.  No problems were found.    After this, we turned our attention to closing.  We reapproximated the left side incision first.  We reapproximated the fibrous capsule of the generator/battery using 3-0 Vicryl sutures in a simple interrupted fashion.  The subcutaneous fat layer was then reapproximated using 3-0 Vicryl sutures in an inverted interrupted fashion to provide padding to the skin for the closure.  The dermal layer was then reapproximated using 3-0 Vicryl sutures in an inverted interrupted fashion.  The skin was then reapproximated using a 4-0 Monocryl suture in a running subcuticular fashion.  We reapproximated the right side incision next.  We reapproximated the fibrous capsule of the generator/battery using 3-0 Vicryl sutures in a simple interrupted fashion.  The subcutaneous fat layer was then reapproximated using 3-0 Vicryl sutures in an inverted interrupted fashion to provide padding to the skin for the closure.  The dermal layer was then reapproximated using 3-0 Vicryl sutures in an inverted interrupted fashion.  The skin was then reapproximated using a 4-0 Monocryl suture in a running subcuticular fashion.  The left and right chest wounds were then  cleaned with wet and dry sponges followed by ChloraPrep and then Dermabond skin glue was applied.    Bilateral DBS therapies were turned on and confirmed.    At the end of the entire operation, the drapes were taken down and the patient was gently reversed from the monitored anesthesia care.  She was then transferred back onto her hospital [facility] for transport to the postanesthesia care unit for ongoing recovery.  At the end of the case, all counts including needle, sponge and instrument counts were correct x 2.  There were no complications.    I, Kenan Collins M.D., Ph.D., Neurosurgery Attending, was present and scrubbed for the entire case and performed the key and critical portions of the case.      KENAN COLLINS MD, PHD

## 2025-03-03 ENCOUNTER — TELEPHONE (OUTPATIENT)
Dept: NEUROSURGERY | Facility: CLINIC | Age: OVER 89
End: 2025-03-03

## 2025-03-03 NOTE — TELEPHONE ENCOUNTER
[facility] Call Center    Phone Message    May a detailed message be left on voicemail: yes     Reason for Call: Other: Patient's son is wondering if he needs to bring his mother in for her post op. He states that she is doing great and that she is 89 and it is hard to get her in and out.  Please review and call patient's son  back to discuss.      Action Taken: Message routed to:  [facility] ([facility]): Neurosurgery    Travel Screening: Not Applicable     Date of Service:

## 2025-03-03 NOTE — CONFIDENTIAL NOTE
Spoke with pt's son. He said that pt's incisions look very good, no redness, swelling, drainage, pain, warmth or fever. Pt has been doing very well since surgery. Son says that it's difficult to get pt to appointments d/t advanced age and they would like to skip the postop appt. Discussed this with Dr. Collins and he said it's OK to cancel the postop appt since pt is doing well. Pt's son understands to call if there are any concerns with pt's postop status. No further questions at this time.

## 2025-04-15 NOTE — PROGRESS NOTES
Diagnosis/Summary/Recommendations:    PATIENT: Jennifer Ferrera  89 year old female     : 1931    ALLY: 2025       MRN: [id number]  [address]   [facility] [id number] [phone] (H)   [phone] (M)      Mary Ann Dyllan  [phone]     Assessment:  (G25.0) Essential tremor  (primary encounter diagnosis)           Review of diagnosis     tremor     Avoidance of dopamine blockers    not using     Motor complication review         Review of Impulse control disorders         Review of surgical or medication options   DBS battery 2020 replacement     Gait/Balance/Falls         Exercise/Therapy performed/offered         Cognitive/Driving         Mood    Escitalopram lexapro 10mg      Hallucinations/delusions         Sleep    Sleep for 3 hours and then get up an gets up and goes  Back to bed.      Bladder/Renal/Prostate/Gyn/Other   Oxybutynin ditropan XL 5mg 24 hr tablet - not taking  Oxybutynin ditropan 5mg tablet  - not taking    GI/Constipation/GERD    has some issues and is on a probiotic  Not taking omeprazole      ENDO/Lipid/DM/Bone density/Thyroid   Cholecalciferol vitamin D3 25 mcg 1000 units  Ibandronate boniva 150mg - not clear if taking  Levothyroxine sodium 50mcg daily   Pravastatin pravachol 20mg at night     Cardio/heart/Hyper or Hypotensive    Atenolol tenormin - not taking  Furosemide lasix 40mg daily     Vision/Dry Eyes/Cataracts/Glaucoma/Macular    Taking ocuvite  Using refresh solution     Heme/Anticoagulation/Antiplatelet/Anemia/Other   Aspirin 81mg daily      ENT/Resp        Skin/Cancer/Seborrhea/other        Musculoskeletal/Pain/Headache        Other:   Aleve 220mg twice daily for arthritis      She is dealing with an infection of her leg.   She is on a topical antibiotic gentamicin and off oral medication.     Blood pressure is now 169/74 and 61 - recorded     Please see mary ann's note for details     Left side was 2.89  Right side was 2.87      Last changed 2020  May need to have replaced in 4-5 years     living by herself in [place]  She is eating microwaved foods  Healthy choice tv dinners  Oatmeal for breakfast  Orange slices for breakfast     Her chewing is not very good so has to puree everything   Because of her jaw infection     Return in one year.      Has  A visiting aide for cleaning and trimming nails etc 3 days a week.   Aide health was there for monitoring her during her shower.   Has life line pendant - fall detection monitor but not sensitive enough.      Tremor is subtle today bilateral on posture and action.         Medications     6-9am 8am 8pm   ASpirin 81mg   1     Carboxymethylcellulose refresh 1%   prn       Cholecalciferol vit d 1000 u 25 mcg   1     Escitalopram lexapro 10mg    1     Furosemide lasix 40mg   1     Levothyroxine sodium 50mcg   1      MVI ocuvite     1    Naproxen aleve 220mg   1 1   Pravastatin pravachol 20mg      1    Probiotic      1                                         Plan:    Device is okay =- was checked by [facility] refer to her notes  Living in a townhouse in [facility]  Batteries were replaced.   One person is there from 1030-1 5 days a week  Other person is there 3-6 during the week and may be there a bit less    She granted proxy access to her daughter erica  She is here with her son -  he got proxy access  She has [employer] set up as well.     Last visit was 7/2023    Had battery replaced.     Return in 1 year.       Coding statement:   Medical Decision Making:  #  Chronic progressive medical conditions addressed  - see above --   Review and/or interpretation of unique test or documentation from a provider outside of neurology yes   Independent historian provided additional details  yes I  Prescription drug management and review of potential side effects and/or monitoring for side effects  -- see above ---  Health impacted by social determinants of health  yes - home bound     I have reviewed  the note as documented above.  This accurately captures the substance of my conversation with the patient and total time spent preparing for visit, executing visit and completing visit on the day of the visit:  30 minutes.  The portion of this total time included face to face time     The longitudinal plan of care for Jennifer Ferrera was addressed during this visit. Due to the added complexity in care, I will continue to support Jennifer Ferrera in the subsequent management of this condition(s) and with the ongoing continuity of care of this condition(s).      Iban Neves MD     ______________________________________    Last visit date and details:             ______________________________________      Patient was asked about 14 Review of systems including changes in vision (dry eyes, double vision), hearing, heart, lungs, musculoskeletal, depression, anxiety, snoring, RBD, insomnia, urinary frequency, urinary urgency, constipation, swallowing problems, hematological, ID, allergies, skin problems: seborrhea, endocrinological: thyroid, diabetes, cholesterol; balance, weight changes, and other neurological problems and these were not significant at this time except for   Patient Active Problem List   Diagnosis    Tremor    S/P brain surgery    Lymphedema    Anxiety    PSEUDOBULBAR AFFECT    Wears glasses    Hearing loss    Urinary incontinence, urge    Urinary urgency    Memory loss    Skin cancer    Hypercholesterolemia    Nocturia    Mood change    Macular degeneration (senile) of retina    Recurrent falls    Osteoporosis    Dysarthria    Facial numbness    Tooth infection    Basal cell carcinoma of deltoid region, right    1st degree AV block    Low back pain    Bradycardia    Depression    Essential tremor    GERD (gastroesophageal reflux disease)    Osteomyelitis (H)    Post-operative state    Sinus tachycardia    Hyperlipidemia    End of battery life of deep brain stimulator    Status post deep brain stimulator  placement    Acquired hypothyroidism    Aseptic necrosis of jaw (H)    Benign hypertension    Chronic pain disorder    Daytime somnolence    Obesity    Osteomyelitis of mandible    Perimenopausal disorder    Primary localized osteoarthritis of pelvic region and thigh    Senile osteoporosis    Spinal stenosis in cervical region    Urinary incontinence    Gastroesophageal reflux disease    Basal cell carcinoma of skin    Major depressive disorder, single episode, unspecified    Unspecified osteoarthritis, unspecified site    Essential (primary) hypertension    Bradycardia, unspecified    Gastro-esophageal reflux disease without esophagitis    Tachycardia, unspecified    Hyperlipidemia, unspecified    Osteomyelitis, unspecified (H)          Allergies   Allergen Reactions    Adhesive Tape      Other reaction(s): itching    Latex     Vioxx      Pt. Is unaware of any allergy to this med.     Past Surgical History:   Procedure Laterality Date    CHOLECYSTECTOMY      Excision of basal cell carcinoma from RUE  Sep 2015    right shoulder    EXTRACAPSULAR CATARACT EXTRATION WITH INTRAOCULAR LENS IMPLANT      EXTRACAPSULAR CATARACT EXTRATION WITH INTRAOCULAR LENS IMPLANT      HYSTERECTOMY      IR PICC PLACEMENT > 5 YRS OF AGE  6/20/2019    REPLACE DEEP BRAIN STIMULATION GENERATOR / BATTERY Bilateral 12/29/2020    Procedure: Replacement of deep brain stimulator generator/battery over bilateral chest wall  **Latex Allergy**;  Surgeon: Noe Collins MD;  Location: [facility] OR    REPLACE DEEP BRAIN STIMULATION GENERATOR / BATTERY Bilateral 2/13/2025    Procedure: Replacement of deep brain stimulator generator/battery over bilateral chest wall  **Latex Allergy**;  Surgeon: Noe Collins MD;  Location: [facility] OR    REPLACE DEEP BRAIN STIMULATION GENERATOR / BATTERY BILATERAL Bilateral 11/8/2016    Procedure: REPLACE DEEP BRAIN STIMULATION GENERATOR / BATTERY BILATERAL;  Surgeon: Bentley Martinez MD;  Location: [facility] OR     REPLACE PULSE GENERATOR SUBCUTANEOUS BILATERAL  29 Jul 2008    Replaced both soletra    REPLACE PULSE GENERATOR SUBCUTANEOUS BILATERAL  7/2/2013    Procedure: REPLACE PULSE GENERATOR SUBCUTANEOUS BILATERAL;  Bilateral Deep Brain Stimulator Battery Exchange ;  Surgeon: Bentley Martinez MD;  Location: [facility] OR    Right DBS Lead removal  jan 2003    infected right lead removed; was in a nursing home on antiobiotics    STEREOTACTIC INSERTION DEEP BRAIN STIMULATION  12 May 2003    placed lead in may after removal of right thalamic  DBS lead due to infection feb 17 2003    STEREOTACTIC INSERTION DEEP BRAIN STIMULATION  9 jun 2003    revision of right DBS electrode    STEREOTACTIC INSERTION DEEP BRAIN STIMULATION  oct 2002    left thalamic DBS lead placed    STEREOTACTIC INSERTION DEEP BRAIN STIMULATION  oct 2002    right thalamic DBS lead placed     Past Medical History:   Diagnosis Date    1st degree AV block 10/20/2015    ECG shows mild sinus bradycardia with 1st degree heart block and min voltage criteria for LVH; borderline ECG    Anxiety     Dysarthria     Essential tremor     Facial numbness     Hearing loss     History of rheumatic fever as a child     Hypercholesterolemia     Hypertension     Low back pain     Lymphedema     Macular degeneration (senile) of retina     Memory loss     Mood change     Osteoporosis, unspecified     PSEUDOBULBAR AFFECT     Recurrent falls     Skin cancer     Tremor     Urinary incontinence, urge     Urinary urgency     Wears glasses      Social History     Socioeconomic History    Marital status:      Spouse name: Not on file    Number of children: Not on file    Years of education: Not on file    Highest education level: Not on file   Occupational History    Not on file   Tobacco Use    Smoking status: Never    Smokeless tobacco: Never   Substance and Sexual Activity    Alcohol use: No    Drug use: No    Sexual activity: Not on file   Other Topics Concern     Parent/sibling w/ CABG, MI or angioplasty before 65F 55M? Not Asked   Social History Narrative    lives in [place]. . Daughter Mary Ann Mijares.     Social Drivers of Health     Financial Resource Strain: Not on file   Food Insecurity: Not on file   Transportation Needs: Not on file   Physical Activity: Not on file   Stress: Not on file   Social Connections: Not on file   Interpersonal Safety: Low Risk  (2/13/2025)    Interpersonal Safety     Do you feel physically and emotionally safe where you currently live?: Yes     Within the past 12 months, have you been hit, slapped, kicked or otherwise physically hurt by someone?: No     Within the past 12 months, have you been humiliated or emotionally abused in other ways by your partner or ex-partner?: No   Housing Stability: Not on file       Drug and lactation database from the [place] [employer]:  http://toxnet.nlm.nih.gov/cgi-bin/sis/htmlgen?LACT      B/P: Data Unavailable, T: Data Unavailable, P: Data Unavailable, R: Data Unavailable 0 lbs 0 oz  not currently breastfeeding., There is no height or weight on file to calculate BMI.  Medications and Vitals not listed above were documented in the cart and reviewed by me.     Current Outpatient Medications   Medication Sig Dispense Refill    aspirin (ASPIRIN 81) 81 MG chewable tablet Take 1 tablet (81 mg) by mouth every morning.      carboxymethylcellulose (REFRESH) 1 % ophthalmic solution Place 1 drop into both eyes 2 times daily as needed 1 Bottle     Cholecalciferol (VITAMIN D3) 25 MCG (1000 UT) CAPS TAKE 1 CAPSULE BY MOUTH DAILY 90 capsule 3    escitalopram (LEXAPRO) 10 MG tablet Take 10 mg by mouth every morning       furosemide (LASIX) 40 MG tablet every morning 40mg tab by mouth daily  1    levothyroxine (SYNTHROID/LEVOTHROID) 75 MCG tablet Take 75 mcg by mouth every morning.      Multiple Vitamins-Minerals (OCUVITE PRESERVISION PO) Take 1 tablet by mouth every evening        pravastatin (PRAVACHOL) 20 MG tablet Take 20 mg by mouth every evening  90 tablet 3    Probiotic Product (PROBIOTIC-10 PO) Take by mouth every morning            Iban Neves MD

## 2025-04-24 ENCOUNTER — OFFICE VISIT (OUTPATIENT)
Dept: NEUROLOGY | Facility: CLINIC | Age: OVER 89
End: 2025-04-24
Payer: COMMERCIAL

## 2025-04-24 VITALS
HEART RATE: 76 BPM | SYSTOLIC BLOOD PRESSURE: 151 MMHG | OXYGEN SATURATION: 97 % | DIASTOLIC BLOOD PRESSURE: 73 MMHG | RESPIRATION RATE: 16 BRPM

## 2025-04-24 DIAGNOSIS — G25.0 ESSENTIAL TREMOR: Primary | ICD-10-CM

## 2025-04-24 PROBLEM — M19.90 OSTEOARTHROSIS: Status: ACTIVE | Noted: 2019-06-20

## 2025-04-24 PROBLEM — R00.0 TACHYCARDIA: Status: ACTIVE | Noted: 2019-06-20

## 2025-04-24 PROBLEM — I10 ESSENTIAL HYPERTENSION: Status: ACTIVE | Noted: 2019-06-20

## 2025-04-24 PROBLEM — K21.9 GASTROESOPHAGEAL REFLUX DISEASE WITHOUT ESOPHAGITIS: Status: ACTIVE | Noted: 2019-06-20

## 2025-04-24 PROBLEM — F32.9 MAJOR DEPRESSION, SINGLE EPISODE: Status: ACTIVE | Noted: 2019-06-20

## 2025-04-24 ASSESSMENT — PAIN SCALES - GENERAL: PAINLEVEL_OUTOF10: NO PAIN (0)

## 2025-04-24 NOTE — LETTER
2025       RE: Jennifer Ferrera  [address]  [facility] [address]     Dear Colleague,    Thank you for referring your patient, Jennifer Ferrera, to the [facility] [place] [facility]. Please see a copy of my visit note below.        Diagnosis/Summary/Recommendations:    PATIENT: Jennifer Ferrera  89 year old female     : 1931    ALLY: 2025       MRN: [id number]  [address]   [facility] [address] [phone] (H)   [phone] (M)      Mary Ann Mijares  [phone]     Assessment:  (G25.0) Essential tremor  (primary encounter diagnosis)           Review of diagnosis     tremor     Avoidance of dopamine blockers    not using     Motor complication review         Review of Impulse control disorders         Review of surgical or medication options   DBS battery 2020 replacement     Gait/Balance/Falls         Exercise/Therapy performed/offered         Cognitive/Driving         Mood    Escitalopram lexapro 10mg      Hallucinations/delusions         Sleep    Sleep for 3 hours and then get up an gets up and goes  Back to bed.      Bladder/Renal/Prostate/Gyn/Other   Oxybutynin ditropan XL 5mg 24 hr tablet - not taking  Oxybutynin ditropan 5mg tablet  - not taking    GI/Constipation/GERD    has some issues and is on a probiotic  Not taking omeprazole      ENDO/Lipid/DM/Bone density/Thyroid   Cholecalciferol vitamin D3 25 mcg 1000 units  Ibandronate boniva 150mg - not clear if taking  Levothyroxine sodium 50mcg daily   Pravastatin pravachol 20mg at night     Cardio/heart/Hyper or Hypotensive    Atenolol tenormin - not taking  Furosemide lasix 40mg daily     Vision/Dry Eyes/Cataracts/Glaucoma/Macular    Taking ocuvite  Using refresh solution     Heme/Anticoagulation/Antiplatelet/Anemia/Other   Aspirin 81mg daily      ENT/Resp        Skin/Cancer/Seborrhea/other        Musculoskeletal/Pain/Headache         Other:   Aleve 220mg twice daily for arthritis      She is dealing with an infection of her leg.   She is on a topical antibiotic gentamicin and off oral medication.     Blood pressure is now 169/74 and 61 - recorded     Please see erica's note for details     Left side was 2.89  Right side was 2.87     Last changed 2020  May need to have replaced in 4-5 years     living by herself in [place]  She is eating microwaved foods  Healthy choice tv dinners  Oatmeal for breakfast  Orange slices for breakfast     Her chewing is not very good so has to puree everything   Because of her jaw infection     Return in one year.      Has  A visiting aide for cleaning and trimming nails etc 3 days a week.   [place] was there for monitoring her during her shower.   Has life line pendant - fall detection monitor but not sensitive enough.      Tremor is subtle today bilateral on posture and action.         Medications     6-9am 8am 8pm   ASpirin 81mg   1     Carboxymethylcellulose refresh 1%   prn       Cholecalciferol vit d 1000 u 25 mcg   1     Escitalopram lexapro 10mg    1     Furosemide lasix 40mg   1     Levothyroxine sodium 50mcg   1      MVI ocuvite     1    Naproxen aleve 220mg   1 1   Pravastatin pravachol 20mg      1    Probiotic      1                                         Plan:    Device is okay =- was checked by [facility] refer to her notes  Living in a townhouse in [facility]  Batteries were replaced.   One person is there from 1030-1 5 days a week  Other person is there 3-6 during the week and may be there a bit less    She granted proxy access to her daughter erica  She is here with her son -  he got proxy access  She has [employer] set up as well.     Last visit was 7/2023    Had battery replaced.     Return in 1 year.       Coding statement:   Medical Decision Making:  #  Chronic progressive medical conditions addressed  - see above --   Review and/or interpretation of unique test or documentation  from a provider outside of neurology yes   Independent historian provided additional details  yes I  Prescription drug management and review of potential side effects and/or monitoring for side effects  -- see above ---  Health impacted by social determinants of health  yes - home bound     I have reviewed the note as documented above.  This accurately captures the substance of my conversation with the patient and total time spent preparing for visit, executing visit and completing visit on the day of the visit:  30 minutes.  The portion of this total time included face to face time     The longitudinal plan of care for Jennifer Ferrera was addressed during this visit. Due to the added complexity in care, I will continue to support Jennifer Ferrera in the subsequent management of this condition(s) and with the ongoing continuity of care of this condition(s).      Iban Neves MD     ______________________________________    Last visit date and details:             ______________________________________      Patient was asked about 14 Review of systems including changes in vision (dry eyes, double vision), hearing, heart, lungs, musculoskeletal, depression, anxiety, snoring, RBD, insomnia, urinary frequency, urinary urgency, constipation, swallowing problems, hematological, ID, allergies, skin problems: seborrhea, endocrinological: thyroid, diabetes, cholesterol; balance, weight changes, and other neurological problems and these were not significant at this time except for   Patient Active Problem List   Diagnosis     Tremor     S/P brain surgery     Lymphedema     Anxiety     PSEUDOBULBAR AFFECT     Wears glasses     Hearing loss     Urinary incontinence, urge     Urinary urgency     Memory loss     Skin cancer     Hypercholesterolemia     Nocturia     Mood change     Macular degeneration (senile) of retina     Recurrent falls     Osteoporosis     Dysarthria     Facial numbness     Tooth infection     Basal cell carcinoma of  deltoid region, right     1st degree AV block     Low back pain     Bradycardia     Depression     Essential tremor     GERD (gastroesophageal reflux disease)     Osteomyelitis (H)     Post-operative state     Sinus tachycardia     Hyperlipidemia     End of battery life of deep brain stimulator     Status post deep brain stimulator placement     Acquired hypothyroidism     Aseptic necrosis of jaw (H)     Benign hypertension     Chronic pain disorder     Daytime somnolence     Obesity     Osteomyelitis of mandible     Perimenopausal disorder     Primary localized osteoarthritis of pelvic region and thigh     Senile osteoporosis     Spinal stenosis in cervical region     Urinary incontinence     Gastroesophageal reflux disease     Basal cell carcinoma of skin     Major depressive disorder, single episode, unspecified     Unspecified osteoarthritis, unspecified site     Essential (primary) hypertension     Bradycardia, unspecified     Gastro-esophageal reflux disease without esophagitis     Tachycardia, unspecified     Hyperlipidemia, unspecified     Osteomyelitis, unspecified (H)          Allergies   Allergen Reactions     Adhesive Tape      Other reaction(s): itching     Latex      Vioxx      Pt. Is unaware of any allergy to this med.     Past Surgical History:   Procedure Laterality Date     CHOLECYSTECTOMY       Excision of basal cell carcinoma from RUE  Sep 2015    right shoulder     EXTRACAPSULAR CATARACT EXTRATION WITH INTRAOCULAR LENS IMPLANT       EXTRACAPSULAR CATARACT EXTRATION WITH INTRAOCULAR LENS IMPLANT       HYSTERECTOMY       IR PICC PLACEMENT > 5 YRS OF AGE  6/20/2019     REPLACE DEEP BRAIN STIMULATION GENERATOR / BATTERY Bilateral 12/29/2020    Procedure: Replacement of deep brain stimulator generator/battery over bilateral chest wall  **Latex Allergy**;  Surgeon: Noe Collins MD;  Location: [facility] OR     REPLACE DEEP BRAIN STIMULATION GENERATOR / BATTERY Bilateral 2/13/2025    Procedure:  Replacement of deep brain stimulator generator/battery over bilateral chest wall  **Latex Allergy**;  Surgeon: Noe Collins MD;  Location: [facility] OR     REPLACE DEEP BRAIN STIMULATION GENERATOR / BATTERY BILATERAL Bilateral 11/8/2016    Procedure: REPLACE DEEP BRAIN STIMULATION GENERATOR / BATTERY BILATERAL;  Surgeon: Bentley Martinez MD;  Location: [facility] OR     REPLACE PULSE GENERATOR SUBCUTANEOUS BILATERAL  29 Jul 2008    Replaced both soletra     REPLACE PULSE GENERATOR SUBCUTANEOUS BILATERAL  7/2/2013    Procedure: REPLACE PULSE GENERATOR SUBCUTANEOUS BILATERAL;  Bilateral Deep Brain Stimulator Battery Exchange ;  Surgeon: Bentley Martinez MD;  Location: [facility] OR     Right DBS Lead removal  jan 2003    infected right lead removed; was in a nursing home on antiobiotics     STEREOTACTIC INSERTION DEEP BRAIN STIMULATION  12 May 2003    placed lead in may after removal of right thalamic  DBS lead due to infection feb 17 2003     STEREOTACTIC INSERTION DEEP BRAIN STIMULATION  9 jun 2003    revision of right DBS electrode     STEREOTACTIC INSERTION DEEP BRAIN STIMULATION  oct 2002    left thalamic DBS lead placed     STEREOTACTIC INSERTION DEEP BRAIN STIMULATION  oct 2002    right thalamic DBS lead placed     Past Medical History:   Diagnosis Date     1st degree AV block 10/20/2015    ECG shows mild sinus bradycardia with 1st degree heart block and min voltage criteria for LVH; borderline ECG     Anxiety      Dysarthria      Essential tremor      Facial numbness      Hearing loss      History of rheumatic fever as a child      Hypercholesterolemia      Hypertension      Low back pain      Lymphedema      Macular degeneration (senile) of retina      Memory loss      Mood change      Osteoporosis, unspecified      PSEUDOBULBAR AFFECT      Recurrent falls      Skin cancer      Tremor      Urinary incontinence, urge      Urinary urgency      Wears glasses      Social History     Socioeconomic History      Marital status:      Spouse name: Not on file     Number of children: Not on file     Years of education: Not on file     Highest education level: Not on file   Occupational History     Not on file   Tobacco Use     Smoking status: Never     Smokeless tobacco: Never   Substance and Sexual Activity     Alcohol use: No     Drug use: No     Sexual activity: Not on file   Other Topics Concern     Parent/sibling w/ CABG, MI or angioplasty before 65F 55M? Not Asked   Social History Narrative    lives in [place]. . Daughter Mary Ann Mijares.     Social Drivers of Health     Financial Resource Strain: Not on file   Food Insecurity: Not on file   Transportation Needs: Not on file   Physical Activity: Not on file   Stress: Not on file   Social Connections: Not on file   Interpersonal Safety: Low Risk  (2/13/2025)    Interpersonal Safety      Do you feel physically and emotionally safe where you currently live?: Yes      Within the past 12 months, have you been hit, slapped, kicked or otherwise physically hurt by someone?: No      Within the past 12 months, have you been humiliated or emotionally abused in other ways by your partner or ex-partner?: No   Housing Stability: Not on file       Drug and lactation database from the [place] [employer] of [employer]:  http://toxnet.nlm.nih.gov/cgi-bin/sis/htmlgen?LACT      B/P: Data Unavailable, T: Data Unavailable, P: Data Unavailable, R: Data Unavailable 0 lbs 0 oz  not currently breastfeeding., There is no height or weight on file to calculate BMI.  Medications and Vitals not listed above were documented in the cart and reviewed by me.     Current Outpatient Medications   Medication Sig Dispense Refill     aspirin (ASPIRIN 81) 81 MG chewable tablet Take 1 tablet (81 mg) by mouth every morning.       carboxymethylcellulose (REFRESH) 1 % ophthalmic solution Place 1 drop into both eyes 2 times daily as needed 1 Bottle      Cholecalciferol (VITAMIN D3) 25  MCG (1000 UT) CAPS TAKE 1 CAPSULE BY MOUTH DAILY 90 capsule 3     escitalopram (LEXAPRO) 10 MG tablet Take 10 mg by mouth every morning        furosemide (LASIX) 40 MG tablet every morning 40mg tab by mouth daily  1     levothyroxine (SYNTHROID/LEVOTHROID) 75 MCG tablet Take 75 mcg by mouth every morning.       Multiple Vitamins-Minerals (OCUVITE PRESERVISION PO) Take 1 tablet by mouth every evening        pravastatin (PRAVACHOL) 20 MG tablet Take 20 mg by mouth every evening  90 tablet 3     Probiotic Product (PROBIOTIC-10 PO) Take by mouth every morning            Iban Neves MD      Again, thank you for allowing me to participate in the care of your patient.      Sincerely,    Iban Neves MD

## 2025-04-24 NOTE — PATIENT INSTRUCTIONS
Medications     6-9am 8am 8pm   ASpirin 81mg   1     Carboxymethylcellulose refresh 1%   prn       Cholecalciferol vit d 1000 u 25 mcg   1     Escitalopram lexapro 10mg    1     Furosemide lasix 40mg   1     Levothyroxine sodium 50mcg   1      MVI ocuvite     1    Naproxen aleve 220mg   1 1   Pravastatin pravachol 20mg      1    Probiotic      1                                         Plan:    Device is okay =- was checked by [facility] refer to her notes  [place] [facility]  B[place]ies were replaced.   One person is there from 1030-1 5 days a week  Other person is there 3-6 during the week and may be there a bit less    She granted proxy access to her daughter erica  She is here with her son -  he got proxy access  She has [employer] set up as well.     Last visit was 7/2023    Had battery replaced.     Return in 1 year.

## 2025-05-10 ENCOUNTER — HEALTH MAINTENANCE LETTER (OUTPATIENT)
Age: OVER 89
End: 2025-05-10

## 2025-08-28 ENCOUNTER — PATIENT OUTREACH (OUTPATIENT)
Dept: CARE COORDINATION | Facility: CLINIC | Age: OVER 89
End: 2025-08-28
Payer: COMMERCIAL

## (undated) DEVICE — SU MONOCRYL 4-0 PS-2 27" UND Y426H

## (undated) DEVICE — LINEN TOWEL PACK X5 5464

## (undated) DEVICE — BLADE CLIPPER 4412A

## (undated) DEVICE — GLOVE PROTEXIS MICRO 7.5  2D73PM75

## (undated) DEVICE — PREP POVIDONE-IODINE 7.5% SCRUB 4OZ BOTTLE MDS093945

## (undated) DEVICE — ESU GROUND PAD ADULT W/CORD E7507

## (undated) DEVICE — COVER CAMERA VIDEO LASER 9X96" 04-CC227

## (undated) DEVICE — SU VICRYL 3-0 SH 8X18" UND J864D

## (undated) DEVICE — LINEN TOWEL PACK X6 WHITE 5487

## (undated) DEVICE — PAD CHUX UNDERPAD 23X24" 7136

## (undated) DEVICE — SOL NACL 0.9% IRRIG 1000ML BOTTLE 2F7124

## (undated) DEVICE — GLOVE PROTEXIS BLUE W/NEU-THERA 8.0  2D73EB80

## (undated) DEVICE — PREP CHLORAPREP 26ML TINTED HI-LITE ORANGE 930815

## (undated) DEVICE — PREP CHLORAPREP 26ML TINTED ORANGE  260815

## (undated) DEVICE — SOL WATER IRRIG 1000ML BOTTLE 2F7114

## (undated) DEVICE — ESU ELEC BLADE 2.75" COATED/INSULATED E1455

## (undated) DEVICE — DECANTER VIAL 2006S

## (undated) DEVICE — PREP POVIDONE-IODINE 10% SOLUTION 4OZ BOTTLE MDS093944

## (undated) DEVICE — SU ETHIBOND 2-0 SHDA 30" X563H

## (undated) DEVICE — NDL COUNTER 20CT 31142493

## (undated) DEVICE — SU DERMABOND ADVANCED .7ML DNX12

## (undated) DEVICE — DRAPE IOBAN INCISE 23X17" 6650EZ

## (undated) DEVICE — GLOVE BIOGEL PI MICRO INDICATOR UNDERGLOVE SZ 8.0 48980

## (undated) DEVICE — SUCTION MANIFOLD DORNOCH ULTRA CART UL-CL500

## (undated) DEVICE — PACK NEURO MINOR UMMC SNE32MNMU4

## (undated) DEVICE — DRAPE SHEET REV FOLD 3/4 9349

## (undated) DEVICE — SUCTION MANIFOLD NEPTUNE 2 SYS 4 PORT 0702-020-000

## (undated) DEVICE — STRAP UNIVERSAL POSITIONING 2-PIECE 4X47X76" 91-287

## (undated) DEVICE — ADH SKIN CLOSURE PREMIERPRO EXOFIN 1.0ML 3470

## (undated) DEVICE — PREP CHLORAPREP CLEAR 3ML 260400

## (undated) DEVICE — PREP POVIDONE IODINE SCRUB 7.5% 4OZ APL82212

## (undated) DEVICE — PREP POVIDONE IODINE SOLUTION 10% 4OZ

## (undated) DEVICE — PREP CHLORAPREP CLEAR 3ML 930400

## (undated) DEVICE — PREP SKIN SCRUB TRAY 4461A

## (undated) DEVICE — GLOVE BIOGEL PI MICRO SZ 7.5 48575

## (undated) DEVICE — LABEL MEDICATION SYSTEM 3303-P

## (undated) RX ORDER — BUPIVACAINE HYDROCHLORIDE 2.5 MG/ML
INJECTION, SOLUTION EPIDURAL; INFILTRATION; INTRACAUDAL; PERINEURAL
Status: DISPENSED
Start: 2025-02-13

## (undated) RX ORDER — LIDOCAINE HYDROCHLORIDE 10 MG/ML
INJECTION, SOLUTION INFILTRATION; PERINEURAL
Status: DISPENSED
Start: 2017-03-30

## (undated) RX ORDER — LIDOCAINE HYDROCHLORIDE AND EPINEPHRINE 10; 10 MG/ML; UG/ML
INJECTION, SOLUTION INFILTRATION; PERINEURAL
Status: DISPENSED
Start: 2025-02-13

## (undated) RX ORDER — LIDOCAINE HYDROCHLORIDE AND EPINEPHRINE 10; 10 MG/ML; UG/ML
INJECTION, SOLUTION INFILTRATION; PERINEURAL
Status: DISPENSED
Start: 2020-12-29

## (undated) RX ORDER — FENTANYL CITRATE 50 UG/ML
INJECTION, SOLUTION INTRAMUSCULAR; INTRAVENOUS
Status: DISPENSED
Start: 2020-12-29

## (undated) RX ORDER — LIDOCAINE HYDROCHLORIDE 20 MG/ML
INJECTION, SOLUTION EPIDURAL; INFILTRATION; INTRACAUDAL; PERINEURAL
Status: DISPENSED
Start: 2020-12-29

## (undated) RX ORDER — PROPOFOL 10 MG/ML
INJECTION, EMULSION INTRAVENOUS
Status: DISPENSED
Start: 2020-12-29

## (undated) RX ORDER — CEFAZOLIN SODIUM 2 G/100ML
INJECTION, SOLUTION INTRAVENOUS
Status: DISPENSED
Start: 2020-12-29

## (undated) RX ORDER — TRIAMCINOLONE ACETONIDE 40 MG/ML
INJECTION, SUSPENSION INTRA-ARTICULAR; INTRAMUSCULAR
Status: DISPENSED
Start: 2017-03-30

## (undated) RX ORDER — BUPIVACAINE HYDROCHLORIDE 2.5 MG/ML
INJECTION, SOLUTION EPIDURAL; INFILTRATION; INTRACAUDAL
Status: DISPENSED
Start: 2020-12-29

## (undated) RX ORDER — FENTANYL CITRATE 50 UG/ML
INJECTION, SOLUTION INTRAMUSCULAR; INTRAVENOUS
Status: DISPENSED
Start: 2025-02-13

## (undated) RX ORDER — EPHEDRINE SULFATE 50 MG/ML
INJECTION, SOLUTION INTRAMUSCULAR; INTRAVENOUS; SUBCUTANEOUS
Status: DISPENSED
Start: 2020-12-29

## (undated) RX ORDER — ONDANSETRON 2 MG/ML
INJECTION INTRAMUSCULAR; INTRAVENOUS
Status: DISPENSED
Start: 2020-12-29